# Patient Record
Sex: MALE | Race: OTHER | Employment: FULL TIME | URBAN - METROPOLITAN AREA
[De-identification: names, ages, dates, MRNs, and addresses within clinical notes are randomized per-mention and may not be internally consistent; named-entity substitution may affect disease eponyms.]

---

## 2017-06-08 ENCOUNTER — HOSPITAL ENCOUNTER (INPATIENT)
Facility: HOSPITAL | Age: 32
LOS: 4 days | Discharge: HOME | DRG: 386 | End: 2017-06-12
Attending: HOSPITALIST | Admitting: HOSPITALIST
Payer: MEDICAID

## 2017-06-08 ENCOUNTER — APPOINTMENT (OUTPATIENT)
Dept: CT IMAGING | Facility: HOSPITAL | Age: 32
DRG: 386 | End: 2017-06-08
Attending: EMERGENCY MEDICINE
Payer: MEDICAID

## 2017-06-08 DIAGNOSIS — R10.9 ABDOMINAL PAIN, ACUTE: Primary | ICD-10-CM

## 2017-06-08 DIAGNOSIS — K56.7 ILEUS OF UNSPECIFIED TYPE (HCC): ICD-10-CM

## 2017-06-08 PROCEDURE — 74177 CT ABD & PELVIS W/CONTRAST: CPT | Performed by: EMERGENCY MEDICINE

## 2017-06-08 PROCEDURE — 99223 1ST HOSP IP/OBS HIGH 75: CPT | Performed by: HOSPITALIST

## 2017-06-08 RX ORDER — MORPHINE SULFATE 2 MG/ML
2 INJECTION, SOLUTION INTRAMUSCULAR; INTRAVENOUS EVERY 2 HOUR PRN
Status: DISCONTINUED | OUTPATIENT
Start: 2017-06-08 | End: 2017-06-12

## 2017-06-08 RX ORDER — MORPHINE SULFATE 4 MG/ML
4 INJECTION, SOLUTION INTRAMUSCULAR; INTRAVENOUS ONCE
Status: COMPLETED | OUTPATIENT
Start: 2017-06-08 | End: 2017-06-08

## 2017-06-08 RX ORDER — DEXTROSE MONOHYDRATE 25 G/50ML
50 INJECTION, SOLUTION INTRAVENOUS AS NEEDED
Status: DISCONTINUED | OUTPATIENT
Start: 2017-06-08 | End: 2017-06-12

## 2017-06-08 RX ORDER — MORPHINE SULFATE 4 MG/ML
4 INJECTION, SOLUTION INTRAMUSCULAR; INTRAVENOUS EVERY 2 HOUR PRN
Status: DISCONTINUED | OUTPATIENT
Start: 2017-06-08 | End: 2017-06-12

## 2017-06-08 RX ORDER — SODIUM CHLORIDE AND POTASSIUM CHLORIDE .9; .15 G/100ML; G/100ML
SOLUTION INTRAVENOUS CONTINUOUS
Status: DISCONTINUED | OUTPATIENT
Start: 2017-06-08 | End: 2017-06-11

## 2017-06-08 RX ORDER — SODIUM CHLORIDE 9 MG/ML
1000 INJECTION, SOLUTION INTRAVENOUS ONCE
Status: COMPLETED | OUTPATIENT
Start: 2017-06-08 | End: 2017-06-08

## 2017-06-08 RX ORDER — METHYLPREDNISOLONE SODIUM SUCCINATE 40 MG/ML
40 INJECTION, POWDER, LYOPHILIZED, FOR SOLUTION INTRAMUSCULAR; INTRAVENOUS EVERY 8 HOURS
Status: DISCONTINUED | OUTPATIENT
Start: 2017-06-08 | End: 2017-06-11

## 2017-06-08 RX ORDER — METHYLPREDNISOLONE SODIUM SUCCINATE 40 MG/ML
40 INJECTION, POWDER, LYOPHILIZED, FOR SOLUTION INTRAMUSCULAR; INTRAVENOUS EVERY 6 HOURS
Status: DISCONTINUED | OUTPATIENT
Start: 2017-06-08 | End: 2017-06-08

## 2017-06-08 RX ORDER — ACETAMINOPHEN 325 MG/1
650 TABLET ORAL EVERY 6 HOURS PRN
Status: DISCONTINUED | OUTPATIENT
Start: 2017-06-08 | End: 2017-06-12

## 2017-06-08 RX ORDER — ONDANSETRON 2 MG/ML
4 INJECTION INTRAMUSCULAR; INTRAVENOUS EVERY 6 HOURS PRN
Status: DISCONTINUED | OUTPATIENT
Start: 2017-06-08 | End: 2017-06-12

## 2017-06-08 NOTE — ED PROVIDER NOTES
Patient Seen in: [facility] Emergency Department    History   Patient presents with:  Cath Tube Problem (gastrointestinal, urinary, integumentary)    Stated Complaint: pain by ostomy    HPI    Patient presents into the emergency room for evaluatio daily with breakfast. Prednisone 40 mg po daily for 1 week, 30 mg po daily x 1 wk, 20 mg po daily x 1 week, 10 mg po daily x 1 week, 10 mg po qod x 2 weeks and stop. No family history on file.       Smoking Status: Never Smoker quadrant of the abdomen surrounding the stoma. No rebound guarding or peritoneal signs. No board like rigidity. No flank tenderness   Musculoskeletal: Normal range of motion. Neurological: He is alert and oriented to person, place, and time.    Skin: S Osmolality 286 275-295 mOsm/kg   GFR, Non-African American >60 >=60   GFR, -American >60 >=60     7:15 PM  Care endorsed to Dr. Tiffany Infante for disposition      Disposition and Plan     Clinical Impression:  Abdominal pain, acute  (primary encounter d

## 2017-06-09 PROCEDURE — 99233 SBSQ HOSP IP/OBS HIGH 50: CPT | Performed by: HOSPITALIST

## 2017-06-09 RX ORDER — GLIMEPIRIDE 2 MG/1
2 TABLET ORAL 2 TIMES DAILY WITH MEALS
Status: DISCONTINUED | OUTPATIENT
Start: 2017-06-09 | End: 2017-06-12

## 2017-06-09 RX ORDER — GLIMEPIRIDE 2 MG/1
2 TABLET ORAL 2 TIMES DAILY WITH MEALS
COMMUNITY
End: 2021-01-07

## 2017-06-09 NOTE — PLAN OF CARE
Problem: Diabetes/Glucose Control  Goal: Glucose maintained within prescribed range  INTERVENTIONS:  - Monitor Blood Glucose as ordered  - Assess for signs and symptoms of hyperglycemia and hypoglycemia  - Administer ordered medications to maintain glucose vomiting  INTERVENTIONS:  - Maintain adequate hydration with IV or PO as ordered and tolerated  - Nasogastric tube to low intermittent suction as ordered  - Evaluate effectiveness of ordered antiemetic medications  - Provide nonpharmacologic comfort measur pain   Outcome: Progressing  (1) Patient pain level is assess using pain scale from 1 to 10.  (2) Patient is administered medications as needed for pain or discomfort. (3) Rest, relaxation, and music is utilized to distract patient from pain.

## 2017-06-09 NOTE — PROGRESS NOTES
[facility]    Progress Note    Vic Cleaning Patient Status:  Inpatient    1985 MRN [id number]   Location [facility] 5SW/SE Attending Delilah Powers MD   Hosp Day # 1 PCP Romulo Pinto MD       Subjective:   Viridiana Gilliam 50%, Glucose-Vitamin C    Results:     Lab Results  Component Value Date   WBC 5.9 06/09/2017   HGB 10.4* 06/09/2017   HCT 33.3* 06/09/2017    06/09/2017   CREATSERUM 0.70 06/09/2017   BUN 3* 06/09/2017    06/09/2017   K 4.9 06/09/2017   CL 10 initiated.  GI has been consulted    Abdominal wall cellulitis  Patient on Zosyn 3.375 g IV piggyback every 8 hours cultures pending at this time.     Type 2 diabetes  Well-controlled at this time however patient with tendency of high sugars with steroids w

## 2017-06-09 NOTE — H&P
[facility]    History & Physical    Js Jaramillo Patient Status:  Inpatient    1985 MRN [id number]   Location [facility] 5SW/SE Attending Francisco Billingsley MD   Hosp Day # 0 PCP Carina Portillo MD     Date:  2017  Da po daily x 1 week, 10 mg po daily x 1 week, 10 mg po qod x 2 weeks and stop. Facility-Administered Medications: None       Review of Systems:  Constitutional:  Weakness, Fatigue. Eye:  Negative. Ear/Nose/Mouth/Throat:  Negative.   Respiratory:  Negat 06/08/2017   BUN 4 06/08/2017    06/08/2017   K 3.3 06/08/2017    06/08/2017   CO2 24 06/08/2017    06/08/2017   CA 8.7 06/08/2017   ESRML 55 06/08/2017   CRP 9.5 06/08/2017       Imaging:  No exam resulted this encounter.     Assessment

## 2017-06-09 NOTE — PLAN OF CARE
Problem: Diabetes/Glucose Control  Goal: Glucose maintained within prescribed range  INTERVENTIONS:  - Monitor Blood Glucose as ordered  - Assess for signs and symptoms of hyperglycemia and hypoglycemia  - Administer ordered medications to maintain glucose profile  Outcome: Progressing  Goal: Maintains adequate nutritional intake (undernourished)  INTERVENTIONS:  - Monitor percentage of each meal consumed  - Identify factors contributing to decreased intake, treat as appropriate  - Assist with meals as neede

## 2017-06-10 PROCEDURE — 99233 SBSQ HOSP IP/OBS HIGH 50: CPT | Performed by: HOSPITALIST

## 2017-06-10 RX ORDER — CHOLECALCIFEROL (VITAMIN D3) 125 MCG
500 CAPSULE ORAL DAILY
Status: DISCONTINUED | OUTPATIENT
Start: 2017-06-11 | End: 2017-06-12

## 2017-06-10 RX ORDER — 0.9 % SODIUM CHLORIDE 0.9 %
VIAL (ML) INJECTION
Status: COMPLETED
Start: 2017-06-10 | End: 2017-06-10

## 2017-06-10 RX ORDER — CYANOCOBALAMIN 1000 UG/ML
1000 INJECTION INTRAMUSCULAR; SUBCUTANEOUS ONCE
Status: COMPLETED | OUTPATIENT
Start: 2017-06-10 | End: 2017-06-10

## 2017-06-10 NOTE — CONSULTS
[facility]    Report of Consultation    Cecy George Patient Status:  Inpatient    1985 MRN [id number]   Location [facility] 5SW/SE Attending Kirstin Guillory MD   Hosp Day # 1 PCP Yaneth Echevarria MD     Date of Admis 5 % 100 mL IVPB 3.375 g Intravenous Q8H       Prescriptions prior to admission:  Adalimumab (HUMIRA) 40 MG/0.8ML Subcutaneous Prefilled Syringe Kit Inject 40 mg into the skin once a week.  Take once weekly on tuesday   glimepiride 2 MG Oral Tab Take 2 mg by normal strength and tone. Normal symmetric reflexes.    Psychiatric: calm and cooperative    Results:     Laboratory Data:    Lab Results  Component Value Date   WBC 5.9 06/09/2017   HGB 10.4* 06/09/2017   HCT 33.3* 06/09/2017    06/09/2017   CREATSE MD, Bud Bonilla MD  6/9/2017    FISTULA PRESENT NEXT TO THE STOMA FROM WHICH THE STOOL COME OUT.

## 2017-06-10 NOTE — PLAN OF CARE
Problem: Diabetes/Glucose Control  Goal: Glucose maintained within prescribed range  INTERVENTIONS:  - Monitor Blood Glucose as ordered  - Assess for signs and symptoms of hyperglycemia and hypoglycemia  - Administer ordered medications to maintain glucose collaborating with pharmacy to review patient’s medication profile   Outcome: Progressing  Patient has illeostomy  Goal: Maintains adequate nutritional intake (undernourished)  INTERVENTIONS:  - Monitor percentage of each meal consumed  - Identify factors level they choose  - Honor patient and family perspectives and choices  Outcome: Progressing  Updated patient on POC and night time medications. Encouraged patient to ask any questions regarding care or concerns.   Patient is agreeable to POC and verbalize toileting schedule  Outcome: Progressing  Call light and bedside table within reach.   Patient verbalizes understanding of call light and uses appropriately

## 2017-06-10 NOTE — PROGRESS NOTES
[facility]    Progress Note    Mana Espino Patient Status:  Inpatient    1985 MRN [id number]   Location [facility] 5SW/SE Attending Navi Landaverde MD   Hosp Day # 2 PCP Mirza Alvares MD       Subjective:   Krissy Riley 1-11 Units Subcutaneous TID CC and HS   • MethylPREDNISolone Sodium Succ  40 mg Intravenous Q8H   • piperacillin-tazobactam  3.375 g Intravenous Q8H       Current PRN Inpatient Meds:      ondansetron HCl, morphINE sulfate (PF) **OR** morphINE sulfate (PF), adjacent rectus abdominis muscle which contains several small pockets of gas. Otherwise, no alina abscess identified in the abdominal wall or in the peritoneal cavity.  3. There is mild dilatation of the afferent small bowel suggestive of ileus or partial o

## 2017-06-10 NOTE — PLAN OF CARE
Problem: Diabetes/Glucose Control  Goal: Glucose maintained within prescribed range  INTERVENTIONS:  - Monitor Blood Glucose as ordered  - Assess for signs and symptoms of hyperglycemia and hypoglycemia  - Administer ordered medications to maintain glucose mobilization and activity  - Obtain nutritional consult as needed  - Establish a toileting routine/schedule  - Consider collaborating with pharmacy to review patient’s medication profile   Outcome: Progressing  Colostomy bag intact.   Goal: Maintains adequa care for you?  - Provide timely, complete, and accurate information to patient/family  - Incorporate patient and family knowledge, values, beliefs, and cultural backgrounds into the planning and delivery of care  - Encourage patient/family to participate i environment to reduce risk of injury  - Provide assistive devices as appropriate  - Consider OT/PT consult to assist with strengthening/mobility  - Encourage toileting schedule   Outcome: Progressing  Call light within reach,bed locked in lowest position.

## 2017-06-11 PROCEDURE — 99232 SBSQ HOSP IP/OBS MODERATE 35: CPT | Performed by: HOSPITALIST

## 2017-06-11 RX ORDER — 0.9 % SODIUM CHLORIDE 0.9 %
VIAL (ML) INJECTION
Status: COMPLETED
Start: 2017-06-11 | End: 2017-06-11

## 2017-06-11 RX ORDER — METHYLPREDNISOLONE SODIUM SUCCINATE 40 MG/ML
40 INJECTION, POWDER, LYOPHILIZED, FOR SOLUTION INTRAMUSCULAR; INTRAVENOUS EVERY 12 HOURS
Status: DISCONTINUED | OUTPATIENT
Start: 2017-06-12 | End: 2017-06-12

## 2017-06-11 NOTE — PLAN OF CARE
Problem: Diabetes/Glucose Control  Goal: Glucose maintained within prescribed range  INTERVENTIONS:  - Monitor Blood Glucose as ordered  - Assess for signs and symptoms of hyperglycemia and hypoglycemia  - Administer ordered medications to maintain glucose profile   Outcome: Progressing  Goal: Maintains adequate nutritional intake (undernourished)  INTERVENTIONS:  - Monitor percentage of each meal consumed  - Identify factors contributing to decreased intake, treat as appropriate  - Assist with meals as need at the level they choose  - Honor patient and family perspectives and choices   Outcome: Progressing    Problem: SKIN/TISSUE INTEGRITY - ADULT  Goal: Skin integrity remains intact  INTERVENTIONS  - Assess and document risk factors for pressure ulcer kaylee

## 2017-06-11 NOTE — PLAN OF CARE
Problem: Diabetes/Glucose Control  Goal: Glucose maintained within prescribed range  INTERVENTIONS:  - Monitor Blood Glucose as ordered  - Assess for signs and symptoms of hyperglycemia and hypoglycemia  - Administer ordered medications to maintain glucose collaborating with pharmacy to review patient’s medication profile   Outcome: Progressing  Receiving 0.9% NaCl infusion with 20mEq of Potassium at 75mL/hour  Goal: Maintains adequate nutritional intake (undernourished)  INTERVENTIONS:  - Monitor percentage cultural backgrounds into the planning and delivery of care  - Encourage patient/family to participate in care and decision-making at the level they choose  - Honor patient and family perspectives and choices   Outcome: Progressing  Updated patient on POC injury  - Provide assistive devices as appropriate  - Consider OT/PT consult to assist with strengthening/mobility  - Encourage toileting schedule   Outcome: Progressing  Call light and bedside table within reach.   Patient has been calling appropriately

## 2017-06-11 NOTE — PROGRESS NOTES
[facility]    Progress Note    Norman Houston Patient Status:  Inpatient    1985 MRN [id number]   Location [facility] 5SW/SE Attending Maria D Teran MD   Hosp Day # 3 PCP Sharona Gallegos MD       Subjective:   Rhoda Nunn 40 mg Intravenous Q8H   • piperacillin-tazobactam  3.375 g Intravenous Q8H       Current PRN Inpatient Meds:      ondansetron HCl, morphINE sulfate (PF) **OR** morphINE sulfate (PF), acetaminophen, dextrose 50%, Glucose-Vitamin C    Results:       Lab Resu diabetes  Well-controlled at this time however patient with tendency of high sugars with steroids will use high-dose sliding scale coverage.  Check A1c in a.m -->7.5., resumed Amaryl    Anemia  Possibly combo of B12 and iron def  Cannot tolerate PO iron  W

## 2017-06-12 ENCOUNTER — SURGERY (OUTPATIENT)
Age: 32
End: 2017-06-12

## 2017-06-12 VITALS
SYSTOLIC BLOOD PRESSURE: 127 MMHG | TEMPERATURE: 98 F | RESPIRATION RATE: 20 BRPM | WEIGHT: 165 LBS | HEIGHT: 70 IN | HEART RATE: 62 BPM | DIASTOLIC BLOOD PRESSURE: 73 MMHG | OXYGEN SATURATION: 97 % | BODY MASS INDEX: 23.62 KG/M2

## 2017-06-12 PROCEDURE — 99239 HOSP IP/OBS DSCHRG MGMT >30: CPT | Performed by: HOSPITALIST

## 2017-06-12 PROCEDURE — 0DJD8ZZ INSPECTION OF LOWER INTESTINAL TRACT, VIA NATURAL OR ARTIFICIAL OPENING ENDOSCOPIC: ICD-10-PCS | Performed by: INTERNAL MEDICINE

## 2017-06-12 RX ORDER — 0.9 % SODIUM CHLORIDE 0.9 %
VIAL (ML) INJECTION
Status: COMPLETED
Start: 2017-06-12 | End: 2017-06-12

## 2017-06-12 RX ORDER — PREDNISONE 20 MG/1
40 TABLET ORAL
Qty: 50 TABLET | Refills: 0 | Status: SHIPPED | OUTPATIENT
Start: 2017-06-12 | End: 2021-01-07

## 2017-06-12 RX ORDER — PREDNISONE 20 MG/1
40 TABLET ORAL
Status: DISCONTINUED | OUTPATIENT
Start: 2017-06-13 | End: 2017-06-12

## 2017-06-12 NOTE — OPERATIVE REPORT
Ileoscopy:  -s/p loop ileostomy:  Erythema and friability at the stoma - biopsies taken  Afferent loop proximally appeared normal except for slight patchy erythema. Biopsies taken from a distance 5-6 cms proximal to the stoma in the afferent loop.     Plan:

## 2017-06-12 NOTE — PLAN OF CARE
Problem: Diabetes/Glucose Control  Goal: Glucose maintained within prescribed range  INTERVENTIONS:  - Monitor Blood Glucose as ordered  - Assess for signs and symptoms of hyperglycemia and hypoglycemia  - Administer ordered medications to maintain glucose toileting routine/schedule  - Consider collaborating with pharmacy to review patient’s medication profile   Outcome: Progressing  Goal: Maintains adequate nutritional intake (undernourished)  INTERVENTIONS:  - Monitor percentage of each meal consumed  - Id Incision(s), wounds(s) or drain site(s) healing without S/S of infection  INTERVENTIONS:  - Assess and document risk factors for pressure ulcer development  - Assess and document skin integrity  - Assess and document dressing/incision, wound bed, drain sit

## 2017-06-12 NOTE — DISCHARGE SUMMARY
[facility]    Discharge Summary    Momo Barrientos Patient Status:  Inpatient    1985 MRN [id number]   Location [facility] 5SW/SE Attending Hernando Mcneil MD   Hosp Day # 4 PCP Cordell Mitchell MD     Date of Admissio taken  Afferent loop proximally appeared normal except for slight patchy erythema. Biopsies taken from a distance 5-6 cms proximal to the stoma in the afferent loop. Plan:  Continue biologics  Await humira blood levels and antibody levels.   Switch to or CONTINUE taking these medications       Instructions Prescription details    Acetaminophen-Codeine #3 300-30 MG Tabs   Commonly known as:  TYLENOL #3        Take 1 tablet by mouth every 4 (four) hours as needed for Pain.     Quantity:  30 tablet   Refills

## 2017-06-12 NOTE — PROGRESS NOTES
[facility]    Progress Note    Sourav Terrell Patient Status:  Inpatient    1985 MRN [id number]   Location [facility] 5SW/SE Attending Chad Washington MD   Hosp Day # 4 PCP Jose Campbell MD       Subjective:   Bridger Alexandra CC and HS   • piperacillin-tazobactam  3.375 g Intravenous Q8H       Current PRN Inpatient Meds:      ondansetron HCl, morphINE sulfate (PF) **OR** morphINE sulfate (PF), acetaminophen, dextrose 50%, Glucose-Vitamin C    Results:       Lab Results  Compone hours  Improved    Type 2 diabetes  Well-controlled at this time however patient with tendency of high sugars with steroids will use high-dose sliding scale coverage.  Check A1c in a.m -->7.5., Cont Amaryl    Anemia  Possibly combo of B12 and iron def  Can

## 2017-06-13 ENCOUNTER — TELEPHONE (OUTPATIENT)
Dept: MEDSURG UNIT | Facility: HOSPITAL | Age: 32
End: 2017-06-13

## 2017-06-13 NOTE — OPERATIVE REPORT
[facility]    PATIENT'S NAME: Cheikh TOPETE   ATTENDING PHYSICIAN: Nguyen Machado MD   OPERATING PHYSICIAN: Naomi Franco MD   PATIENT ACCOUNT#:   [id number]    LOCATION:  [address] [place] [address] #:   [id number]       DATE OF BIRTH: normal.    PLAN:    1. Follow biopsy results. 2.   Advance diet. 3.   Continue Humira. 4.   We will switch to oral steroids with taper. 5.   Outpatient followup. 6.   Further recommendations after workup and treatment. Dictated By Lashawn Ivey

## 2017-06-14 ENCOUNTER — TELEPHONE (OUTPATIENT)
Dept: CARDIOLOGY UNIT | Facility: HOSPITAL | Age: 32
End: 2017-06-14

## 2017-06-15 NOTE — PAYOR COMM NOTE
--------------  ADMISSION REVIEW     Payor: Jonathan Sebastian #:  [id number]  Authorization Number: [id number]    Admit date: 6/8/2017  6:10 PM         Patient Seen in: [facility] Emergency Department    History Systems   Constitutional: Positive for chills. Negative for fever. Gastrointestinal: Positive for abdominal pain. Negative for nausea, vomiting and diarrhea. All other systems reviewed and are negative.       Positive for stated complaint: pain by ostom Results for orders placed or performed during the hospital encounter of 27/33/72  -BASIC METABOLIC PANEL (8)   Collection Time: 06/08/17  6:25 PM   Result Value Ref Range   Glucose 161 (H) 70-99 mg/dL   Sodium 138 136-144 mmol/L   Potassium 3.3 3.3-5 file.    Allergies:    Ciprofloxacin               Comment:Other reaction(s): burning urination    Home Medications:  Prior to Admission Medications   Prescriptions Last Dose Informant Patient Reported? Taking?    Acetaminophen-Codeine #3 300-30 MG Oral Tab ileostomy bag. Lymphatics:  No lymphadenopathy neck, axilla, groin. Musculoskeletal: Normal range of motion. normal strength. Feet:  Normal pulses. Neurologic:  Alert, oriented, no focal deficits, cranial nerves II-XII are grossly intact.   Cognition a in the 24 hours ending 06/09/17 4770      GENERAL:  The patient appeared to be in no distress and was comfortable.   SKIN:  Warm and hydrated  PSYCHIATRIC: Calm and cooperative    HEENT:  Head was atraumatic and normocephalic.  Eyes: Sclera was anicteric.  the peritoneal cavity. 3. There is mild dilatation of the afferent small bowel suggestive of ileus or partial obstruction. Hyperemia of small bowel mucosa suggestive of inflammatory process.  Efferent small bowel is decompressed.             Assessment and 3.375 g IV piggyback every 8 hours cultures pending at this time.     Type 2 diabetes  Well-controlled at this time however patient with tendency of high sugars with steroids will use high-dose sliding scale coverage.  Check A1c in a.m., resume Amaryl    An use high-dose sliding scale coverage.  Check A1c in a.m -->7.5., resumed Amaryl    Anemia  Possibly combo of B12 and iron def  Cannot tolerate PO iron  Will cont venofer x 3 d total  Give IM B12 x 1 then po    Prophylaxis  Subcutaneous heparin            D

## 2017-10-03 ENCOUNTER — LAB ENCOUNTER (OUTPATIENT)
Dept: LAB | Age: 32
End: 2017-10-03
Attending: INTERNAL MEDICINE
Payer: MEDICAID

## 2017-10-03 DIAGNOSIS — D64.9 ANEMIA: ICD-10-CM

## 2017-10-03 DIAGNOSIS — K50.90 CROHN'S DISEASE (HCC): Primary | ICD-10-CM

## 2017-10-03 PROCEDURE — 82607 VITAMIN B-12: CPT

## 2017-10-03 PROCEDURE — 80053 COMPREHEN METABOLIC PANEL: CPT

## 2017-10-03 PROCEDURE — 82746 ASSAY OF FOLIC ACID SERUM: CPT

## 2017-10-03 PROCEDURE — 85025 COMPLETE CBC W/AUTO DIFF WBC: CPT

## 2017-10-03 PROCEDURE — 83540 ASSAY OF IRON: CPT

## 2017-10-03 PROCEDURE — 82657 ENZYME CELL ACTIVITY: CPT

## 2017-10-03 PROCEDURE — 84466 ASSAY OF TRANSFERRIN: CPT

## 2017-10-03 PROCEDURE — 36415 COLL VENOUS BLD VENIPUNCTURE: CPT

## 2018-01-11 ENCOUNTER — HOSPITAL ENCOUNTER (OUTPATIENT)
Dept: ULTRASOUND IMAGING | Age: 33
Discharge: HOME | End: 2018-01-11
Attending: NURSE PRACTITIONER
Payer: MEDICAID

## 2018-01-11 PROCEDURE — 93970 EXTREMITY STUDY: CPT | Performed by: NURSE PRACTITIONER

## 2020-12-05 ENCOUNTER — LAB ENCOUNTER (OUTPATIENT)
Dept: LAB | Facility: REFERENCE LAB | Age: 35
End: 2020-12-05
Attending: FAMILY MEDICINE
Payer: MEDICAID

## 2020-12-05 DIAGNOSIS — Z13.9 SCREENING FOR CONDITION: Primary | ICD-10-CM

## 2020-12-05 DIAGNOSIS — E11.9 DIABETES MELLITUS (HCC): ICD-10-CM

## 2020-12-05 DIAGNOSIS — R03.0 ELEVATED BLOOD PRESSURE READING WITHOUT DIAGNOSIS OF HYPERTENSION: ICD-10-CM

## 2020-12-05 PROCEDURE — 36415 COLL VENOUS BLD VENIPUNCTURE: CPT

## 2020-12-05 PROCEDURE — 85025 COMPLETE CBC W/AUTO DIFF WBC: CPT

## 2020-12-05 PROCEDURE — 80061 LIPID PANEL: CPT

## 2020-12-05 PROCEDURE — 84443 ASSAY THYROID STIM HORMONE: CPT

## 2020-12-05 PROCEDURE — 81001 URINALYSIS AUTO W/SCOPE: CPT

## 2020-12-05 PROCEDURE — 85060 BLOOD SMEAR INTERPRETATION: CPT

## 2020-12-05 PROCEDURE — 80053 COMPREHEN METABOLIC PANEL: CPT

## 2020-12-05 PROCEDURE — 83036 HEMOGLOBIN GLYCOSYLATED A1C: CPT

## 2020-12-12 ENCOUNTER — OFFICE VISIT (OUTPATIENT)
Dept: OPHTHALMOLOGY | Facility: CLINIC | Age: 35
End: 2020-12-12
Payer: MEDICAID

## 2020-12-12 DIAGNOSIS — E11.9 TYPE 2 DIABETES MELLITUS WITHOUT RETINOPATHY (HCC): Primary | ICD-10-CM

## 2020-12-12 PROCEDURE — 92004 COMPRE OPH EXAM NEW PT 1/>: CPT | Performed by: OPHTHALMOLOGY

## 2020-12-12 NOTE — ASSESSMENT & PLAN NOTE
Diabetes type II: no background of retinopathy, no signs of neovascularization noted. Discussed ocular and systemic benefits of blood sugar control. Diagnosis and treatment discussed in detail with patient.   Patient has an upcoming appointment with endoc

## 2020-12-12 NOTE — PROGRESS NOTES
Christopher Arredondo is a 28year old male.     HPI:     HPI     Diabetic Eye Exam      Additional comments: Pt has been a diabetic for 2 years       Pt's diabetes is currently controlled by pills  Pt checks BS 2x a day   Pt's last blood sugar was 154  Last HA1C w Oral Tab Take 1 tablet by mouth every 4 (four) hours as needed for Pain.  30 tablet 0       Allergies:    Ciprofloxacin               Comment:Other reaction(s): burning urination    ROS:     ROS     Positive for: Endocrine, Eyes    Negative for: Constitutio an upcoming appointment with endocrinologist- Dr. Ian Jaeger. Stressed importance of blood sugar control. Stressed importance of yearly eye exams due to diabetes. No orders of the defined types were placed in this encounter.       Meds This Visit:  JEANIE

## 2020-12-12 NOTE — PATIENT INSTRUCTIONS
Type 2 diabetes mellitus without retinopathy ([facility] [address]  Diabetes type II: no background of retinopathy, no signs of neovascularization noted. Discussed ocular and systemic benefits of blood sugar control.   Diagnosis and treatment discussed in detail with brenton

## 2021-01-07 ENCOUNTER — OFFICE VISIT (OUTPATIENT)
Dept: NEPHROLOGY | Facility: CLINIC | Age: 36
End: 2021-01-07
Payer: MEDICAID

## 2021-01-07 VITALS
DIASTOLIC BLOOD PRESSURE: 67 MMHG | BODY MASS INDEX: 24.01 KG/M2 | HEART RATE: 102 BPM | SYSTOLIC BLOOD PRESSURE: 115 MMHG | WEIGHT: 153 LBS | HEIGHT: 67 IN

## 2021-01-07 DIAGNOSIS — R80.9 NON-NEPHROTIC RANGE PROTEINURIA: Primary | ICD-10-CM

## 2021-01-07 PROCEDURE — 3008F BODY MASS INDEX DOCD: CPT | Performed by: INTERNAL MEDICINE

## 2021-01-07 PROCEDURE — 3074F SYST BP LT 130 MM HG: CPT | Performed by: INTERNAL MEDICINE

## 2021-01-07 PROCEDURE — 3078F DIAST BP <80 MM HG: CPT | Performed by: INTERNAL MEDICINE

## 2021-01-07 PROCEDURE — 99244 OFF/OP CNSLTJ NEW/EST MOD 40: CPT | Performed by: INTERNAL MEDICINE

## 2021-01-07 RX ORDER — SITAGLIPTIN 50 MG/1
50 TABLET, FILM COATED ORAL DAILY
COMMUNITY
Start: 2020-12-22

## 2021-01-07 RX ORDER — GLIPIZIDE 10 MG/1
10 TABLET ORAL 2 TIMES DAILY
COMMUNITY
Start: 2020-12-08

## 2021-01-07 NOTE — PROGRESS NOTES
Consult Requested By: Dr. Jeremy Rosales    Reason for Consult: Proteinuria     HPI:     Patient is a 28 yrs old male with pmh of DM II x 3 yrs, Crohn's disease s/p ileostomy 2012 at [facility], nephrolithiasis x 1 who presented for work up and evaluation of kidn for decreased activity, fever, irritability and lethargy  ENMT:  Negative for ear drainage, hearing loss and nasal drainage  Eyes:  Negative for eye discharge and vision loss  Cardiovascular:  Negative for chest pain, sobs  Respiratory:  Negative for cough Ratio, Random Urine      Protein,Total,Urine, Random [E]      Meds This Visit:  Requested Prescriptions      No prescriptions requested or ordered in this encounter       Imaging & Referrals:  US KIDNEY/BLADDER (YXE=[id number])     1/7/2021  Adrián Staton MD

## 2021-01-08 ENCOUNTER — APPOINTMENT (OUTPATIENT)
Dept: HEMATOLOGY/ONCOLOGY | Facility: HOSPITAL | Age: 36
End: 2021-01-08
Attending: INTERNAL MEDICINE
Payer: MEDICAID

## 2021-01-18 ENCOUNTER — HOSPITAL ENCOUNTER (OUTPATIENT)
Dept: ULTRASOUND IMAGING | Age: 36
Discharge: HOME | End: 2021-01-18
Attending: INTERNAL MEDICINE
Payer: MEDICAID

## 2021-01-18 DIAGNOSIS — R80.9 NON-NEPHROTIC RANGE PROTEINURIA: ICD-10-CM

## 2021-01-18 PROCEDURE — 76770 US EXAM ABDO BACK WALL COMP: CPT | Performed by: INTERNAL MEDICINE

## 2021-01-25 ENCOUNTER — TELEMEDICINE (OUTPATIENT)
Dept: ENDOCRINOLOGY CLINIC | Facility: CLINIC | Age: 36
End: 2021-01-25
Payer: MEDICAID

## 2021-01-25 DIAGNOSIS — E11.65 TYPE 2 DIABETES MELLITUS WITH HYPERGLYCEMIA, WITHOUT LONG-TERM CURRENT USE OF INSULIN (HCC): Primary | ICD-10-CM

## 2021-01-25 PROCEDURE — 99204 OFFICE O/P NEW MOD 45 MIN: CPT | Performed by: INTERNAL MEDICINE

## 2021-01-25 NOTE — H&P
Telehealth outside of [address] Verbal Consent   I conducted a telehealth visit with Anuja Dey today, 01/25/21, which was completed using two-way, real-time interactive audio and video communication.  This has been done in good nathalie to provid denies - last eye exam : up to date  History of Neuropathy: denies  History of Nephropathy: will check    ASSOCIATED COMPLICATIONS:   HTN: denies  Hyperlipidemia: no  Coronary Artery Disease:  denies  Cerebrovascular Disease: denies      HOME GLUCOSE READI Negative for: weight change, fever, fatigue, cold/heat intolerance  Eyes: Negative for:  Visual changes, proptosis, blurring, floaters, poor night vision, impaired color vision  ENT: Negative for:  dysphagia, neck swelling, dysphonia  Respiratory: Negative controlling DM to prevent associated complications  - Importance of dietary changes  Provided support and encouragement   Discussed meal prep ideas   - Daily exercise  - I also reviewed various DM medications  Will avoid SGLT 2 inhibitors given h/o renal s and video communication. This has been done in good nathalie to provide continuity of care in the best interest of the provider-patient relationship, due to the ongoing public health crisis/national emergency and because of restrictions of visitation.   There

## 2021-01-29 ENCOUNTER — OFFICE VISIT (OUTPATIENT)
Dept: HEMATOLOGY/ONCOLOGY | Facility: HOSPITAL | Age: 36
End: 2021-01-29
Attending: INTERNAL MEDICINE
Payer: MEDICAID

## 2021-01-29 VITALS
TEMPERATURE: 98 F | BODY MASS INDEX: 23.7 KG/M2 | RESPIRATION RATE: 16 BRPM | DIASTOLIC BLOOD PRESSURE: 74 MMHG | WEIGHT: 151 LBS | SYSTOLIC BLOOD PRESSURE: 112 MMHG | HEART RATE: 86 BPM | OXYGEN SATURATION: 100 % | HEIGHT: 67 IN

## 2021-01-29 DIAGNOSIS — E55.9 VITAMIN D DEFICIENCY: ICD-10-CM

## 2021-01-29 DIAGNOSIS — K90.89 OTHER SPECIFIED INTESTINAL MALABSORPTION: ICD-10-CM

## 2021-01-29 DIAGNOSIS — D50.0 IRON DEFICIENCY ANEMIA DUE TO CHRONIC BLOOD LOSS: Primary | ICD-10-CM

## 2021-01-29 DIAGNOSIS — E53.8 B12 DEFICIENCY: ICD-10-CM

## 2021-01-29 PROBLEM — K90.9 MALABSORPTION: Status: ACTIVE | Noted: 2021-01-29

## 2021-01-29 PROBLEM — K90.9 MALABSORPTION (HCC): Status: ACTIVE | Noted: 2021-01-29

## 2021-01-29 PROCEDURE — 99244 OFF/OP CNSLTJ NEW/EST MOD 40: CPT | Performed by: INTERNAL MEDICINE

## 2021-01-29 RX ORDER — ERGOCALCIFEROL 1.25 MG/1
50000 CAPSULE ORAL WEEKLY
Qty: 4 CAPSULE | Refills: 2 | Status: SHIPPED | OUTPATIENT
Start: 2021-01-29 | End: 2021-08-03

## 2021-01-29 RX ORDER — CYANOCOBALAMIN 1000 UG/ML
1000 INJECTION INTRAMUSCULAR; SUBCUTANEOUS ONCE
Status: CANCELLED | OUTPATIENT
Start: 2021-01-29

## 2021-01-29 RX ORDER — FOLIC ACID 1 MG/1
1 TABLET ORAL DAILY
Qty: 90 TABLET | Refills: 3 | Status: SHIPPED | OUTPATIENT
Start: 2021-01-29

## 2021-01-29 NOTE — CONSULTS
FLOR Espino is a 28year old male who is here today at the request of Rosemarie Villa MD for evaluation of Iron deficiency anemia due to chronic blood loss  (primary encounter diagnosis)  Other specified intestinal malabsorption  B12 deficiency Sig Dispense Refill   • ustekinumab 90 MG/ML Subcutaneous Solution Prefilled Syringe injection INJECT 90MG UNDER THE SKIN EVERY 8 WEEKS     • metFORMIN HCl 1000 MG Oral Tab Take 1,000 mg by mouth 2 (two) times daily.      • glipiZIDE 10 MG Oral Tab Take 10 diagnosis)  Other specified intestinal malabsorption  B12 deficiency  Vitamin d deficiency    Anemia has been present for over 10 years.     Patient has anemia due to chronic iron deficiency secondary to poor absorption as a sequela of Chron's disease and i patient has nutritional deficiencies, will replace accordingly.       Follow up in 3 months with Orders Placed This Encounter      CBC With Differential With Platelet          Standing Status: Standing          Number of Occurrences: 4          Standing Exp MONOCYTES #      0.0 - 1.0 K/UL       EOSINOPHILS #      0.0 - 0.7 K/UL       BASOPHILS #      0.0 - 0.2 K/UL       ANISOCYTOSIS             HYPOCHROMASIA             MACROCYTOSIS             MICROCYTOSIS             RDW-SD      35.1 - 46.3 fL 36.7 ANISOCYTOSIS        2+ (A)   HYPOCHROMASIA        1+   MACROCYTOSIS        1+   MICROCYTOSIS        2+ (A)   RDW-SD      35.1 - 46.3 fL     RDW      11.0 - 15.0 %     Prelim Neutrophil Abs      1.50 - 7.70 x10 (3) uL     Neutrophils Absolute      1.50 - (01/26/2021 11:43 AM CST)   Performing Organization Address City/State/ZIP Code Phone Number   [phone] [facility] [address] [facility]       Back to top of Lab Results       CBC (01/26/2021 11:43 Results       C-reactive protein (01/26/2021 11:43 AM CST)  C-reactive protein (01/26/2021 11:43 AM CST)   Component Value Ref Range Performed At Pathologist Signature   C-REACTIVE PROTEIN 23.4 (H) <8.0 MG/L [facility] PATHOLOGY LABORATORY       C-reactive GFR (mL/min/1.73 m2) = 141 x min(Scr/kappa, 1)alpha x max(Scr/kappa, 1)-1.209 x 0.993Age x 1.018 (if female) x 1.159 (if black) where:  -Scr is serum creatinine in mg/dL  -kappa is 0.7 for females and 0.9 for males  -alpha is -0.329 for females and -0. [place] [address] Venous blood specimen (specimen)     CBC and differential (01/26/2021 11:43 AM CST)   Narrative Performed At   The following orders were created for panel order CBC and differential.    Procedure                               Abnormality         Status     At Pathologist Signature   HEPATITIS B CORE AB, TOTAL Negative  Comment:   The test is performed using Abbott  Chemiluminescent   Microparticle Immunoassay.   Negative [facility] PATHOLOGY LABORATORY       Hepatitis B core antibody, total (01/26/202 (01/26/2021 11:43 AM CST)  Hepatitis B surface antigen (01/26/2021 11:43 AM CST)   Component Value Ref Range Performed At Pathologist Signature   HEPATITIS B SURFACE AG Negative  Comment:   The test is performed using Abbott  Chemiluminescent   Mi (01/26/2021 11:43 AM CST)   Performing Organization Address City/State/ZIP Code Phone Number   [phone] [address] Tirado [facility] [address] [facility] [address]       Back to top of Lab Results       Vitamin D 25 hydroxy ( Statement B: [email or web address]  Performed By: Maggie Garcia [address]  [place], [address] [facility]  : Wilberto Nevarez.  Yue Fontenot MD 60.0 - 120.0 ug/dL [facility] LABORATORY       Zinc (01/26/2021 11:43 AM CST)   Specimen   Blood - Venous blood spec

## 2021-01-29 NOTE — PATIENT INSTRUCTIONS
Iron Dextran injection  Brand Name: Yari Ordonez  What is this medicine? IRON DEXTRAN (AHY madelaine DEX matt) is an iron complex. Iron is used to make healthy red blood cells, which carry oxygen and nutrients through the body.  This medicine is used to treat people It is important not to miss your dose. Call your doctor or health care professional if you are unable to keep an appointment. Where should I keep my medicine? This drug is given in a hospital or clinic and will not be stored at home.   What should I tell

## 2021-02-04 ENCOUNTER — OFFICE VISIT (OUTPATIENT)
Dept: HEMATOLOGY/ONCOLOGY | Facility: HOSPITAL | Age: 36
End: 2021-02-04
Attending: INTERNAL MEDICINE
Payer: MEDICAID

## 2021-02-04 VITALS
SYSTOLIC BLOOD PRESSURE: 117 MMHG | RESPIRATION RATE: 16 BRPM | OXYGEN SATURATION: 100 % | DIASTOLIC BLOOD PRESSURE: 56 MMHG | TEMPERATURE: 98 F | HEART RATE: 98 BPM

## 2021-02-04 VITALS
HEART RATE: 98 BPM | TEMPERATURE: 98 F | DIASTOLIC BLOOD PRESSURE: 56 MMHG | RESPIRATION RATE: 16 BRPM | SYSTOLIC BLOOD PRESSURE: 117 MMHG | OXYGEN SATURATION: 100 %

## 2021-02-04 DIAGNOSIS — D50.0 IRON DEFICIENCY ANEMIA DUE TO CHRONIC BLOOD LOSS: Primary | ICD-10-CM

## 2021-02-04 DIAGNOSIS — E53.8 B12 DEFICIENCY: ICD-10-CM

## 2021-02-04 DIAGNOSIS — T80.90XA INFUSION REACTION, INITIAL ENCOUNTER: ICD-10-CM

## 2021-02-04 DIAGNOSIS — K90.89 OTHER SPECIFIED INTESTINAL MALABSORPTION: ICD-10-CM

## 2021-02-04 DIAGNOSIS — D50.8 IRON DEFICIENCY ANEMIA SECONDARY TO INADEQUATE DIETARY IRON INTAKE: ICD-10-CM

## 2021-02-04 DIAGNOSIS — D50.8 IRON DEFICIENCY ANEMIA SECONDARY TO INADEQUATE DIETARY IRON INTAKE: Primary | ICD-10-CM

## 2021-02-04 PROCEDURE — S0028 INJECTION, FAMOTIDINE, 20 MG: HCPCS

## 2021-02-04 PROCEDURE — 96374 THER/PROPH/DIAG INJ IV PUSH: CPT

## 2021-02-04 PROCEDURE — 96372 THER/PROPH/DIAG INJ SC/IM: CPT

## 2021-02-04 PROCEDURE — 99213 OFFICE O/P EST LOW 20 MIN: CPT | Performed by: NURSE PRACTITIONER

## 2021-02-04 PROCEDURE — 96375 TX/PRO/DX INJ NEW DRUG ADDON: CPT

## 2021-02-04 RX ORDER — FAMOTIDINE 10 MG/ML
INJECTION, SOLUTION INTRAVENOUS
Status: COMPLETED
Start: 2021-02-04 | End: 2021-02-04

## 2021-02-04 RX ORDER — CYANOCOBALAMIN 1000 UG/ML
INJECTION INTRAMUSCULAR; SUBCUTANEOUS
Status: COMPLETED
Start: 2021-02-04 | End: 2021-02-04

## 2021-02-04 RX ORDER — CYANOCOBALAMIN 1000 UG/ML
1000 INJECTION INTRAMUSCULAR; SUBCUTANEOUS ONCE
Status: COMPLETED | OUTPATIENT
Start: 2021-02-04 | End: 2021-02-04

## 2021-02-04 RX ORDER — CYANOCOBALAMIN 1000 UG/ML
1000 INJECTION INTRAMUSCULAR; SUBCUTANEOUS ONCE
Status: CANCELLED | OUTPATIENT
Start: 2021-02-05

## 2021-02-04 RX ORDER — METHYLPREDNISOLONE SODIUM SUCCINATE 40 MG/ML
40 INJECTION, POWDER, LYOPHILIZED, FOR SOLUTION INTRAMUSCULAR; INTRAVENOUS ONCE
Status: COMPLETED | OUTPATIENT
Start: 2021-02-04 | End: 2021-02-04

## 2021-02-04 RX ORDER — FAMOTIDINE 10 MG/ML
20 INJECTION, SOLUTION INTRAVENOUS ONCE
Status: COMPLETED | OUTPATIENT
Start: 2021-02-04 | End: 2021-02-04

## 2021-02-04 RX ADMIN — FAMOTIDINE 20 MG: 10 INJECTION, SOLUTION INTRAVENOUS at 13:40:00

## 2021-02-04 RX ADMIN — CYANOCOBALAMIN 1000 MCG: 1000 INJECTION INTRAMUSCULAR; SUBCUTANEOUS at 14:12:00

## 2021-02-04 RX ADMIN — METHYLPREDNISOLONE SODIUM SUCCINATE 40 MG: 40 INJECTION, POWDER, LYOPHILIZED, FOR SOLUTION INTRAMUSCULAR; INTRAVENOUS at 13:50:00

## 2021-02-04 NOTE — PROGRESS NOTES
Asked to see patient in infusion center. Pt had hives across both shoulder and tightness in the chest after receiving iron dextran test dose. Pt was given famotidine and IV steroids and symptoms resolved. Discussed with Dr Jc Dial- will treat as allergy.  Pt wi

## 2021-02-04 NOTE — PROGRESS NOTES
Patient arrives for iron dextran and B12 injection. Patient reports he has had iron in the past unaware of what kind. Educated patient on what to expect when receiving iron. PIV started in left forearm, positive blood return noted.    Test dose of iron dext

## 2021-02-05 ENCOUNTER — NURSE ONLY (OUTPATIENT)
Dept: HEMATOLOGY/ONCOLOGY | Facility: HOSPITAL | Age: 36
End: 2021-02-05
Attending: INTERNAL MEDICINE
Payer: MEDICAID

## 2021-02-05 VITALS
HEART RATE: 77 BPM | DIASTOLIC BLOOD PRESSURE: 69 MMHG | OXYGEN SATURATION: 100 % | TEMPERATURE: 98 F | SYSTOLIC BLOOD PRESSURE: 117 MMHG | RESPIRATION RATE: 16 BRPM

## 2021-02-05 DIAGNOSIS — K90.89 OTHER SPECIFIED INTESTINAL MALABSORPTION: ICD-10-CM

## 2021-02-05 DIAGNOSIS — D50.0 IRON DEFICIENCY ANEMIA DUE TO CHRONIC BLOOD LOSS: Primary | ICD-10-CM

## 2021-02-05 DIAGNOSIS — D50.8 IRON DEFICIENCY ANEMIA SECONDARY TO INADEQUATE DIETARY IRON INTAKE: ICD-10-CM

## 2021-02-05 PROCEDURE — 96372 THER/PROPH/DIAG INJ SC/IM: CPT

## 2021-02-05 RX ORDER — CYANOCOBALAMIN 1000 UG/ML
1000 INJECTION INTRAMUSCULAR; SUBCUTANEOUS ONCE
Status: CANCELLED | OUTPATIENT
Start: 2021-02-05

## 2021-02-05 RX ORDER — CYANOCOBALAMIN 1000 UG/ML
1000 INJECTION INTRAMUSCULAR; SUBCUTANEOUS ONCE
Status: CANCELLED
Start: 2021-02-05 | End: 2021-02-05

## 2021-02-05 RX ORDER — CYANOCOBALAMIN 1000 UG/ML
1000 INJECTION INTRAMUSCULAR; SUBCUTANEOUS ONCE
Status: CANCELLED
Start: 2021-02-06 | End: 2021-02-06

## 2021-02-05 RX ORDER — CYANOCOBALAMIN 1000 UG/ML
1000 INJECTION INTRAMUSCULAR; SUBCUTANEOUS ONCE
Status: CANCELLED | OUTPATIENT
Start: 2021-02-06

## 2021-02-05 RX ORDER — CYANOCOBALAMIN 1000 UG/ML
1000 INJECTION INTRAMUSCULAR; SUBCUTANEOUS ONCE
Status: COMPLETED | OUTPATIENT
Start: 2021-02-05 | End: 2021-02-05

## 2021-02-05 RX ORDER — CYANOCOBALAMIN 1000 UG/ML
INJECTION INTRAMUSCULAR; SUBCUTANEOUS
Status: DISCONTINUED
Start: 2021-02-05 | End: 2021-02-05

## 2021-02-05 RX ADMIN — CYANOCOBALAMIN 1000 MCG: 1000 INJECTION INTRAMUSCULAR; SUBCUTANEOUS at 13:20:00

## 2021-02-05 NOTE — PROGRESS NOTES
James Speaks is here for day 2 of 5 B12 injections  He is feeling very well today  No s/s adverse response after yesterday - no further hives, no rash or other issues or concerns  He is in good spirits  VSS    Reviewed scheduling  He wishes to know when venofer

## 2021-02-08 ENCOUNTER — NURSE ONLY (OUTPATIENT)
Dept: HEMATOLOGY/ONCOLOGY | Facility: HOSPITAL | Age: 36
End: 2021-02-08
Attending: INTERNAL MEDICINE
Payer: MEDICAID

## 2021-02-08 VITALS
RESPIRATION RATE: 16 BRPM | TEMPERATURE: 98 F | DIASTOLIC BLOOD PRESSURE: 64 MMHG | OXYGEN SATURATION: 100 % | SYSTOLIC BLOOD PRESSURE: 118 MMHG | HEART RATE: 92 BPM

## 2021-02-08 DIAGNOSIS — D50.8 IRON DEFICIENCY ANEMIA SECONDARY TO INADEQUATE DIETARY IRON INTAKE: ICD-10-CM

## 2021-02-08 DIAGNOSIS — K90.89 OTHER SPECIFIED INTESTINAL MALABSORPTION: ICD-10-CM

## 2021-02-08 DIAGNOSIS — D50.0 IRON DEFICIENCY ANEMIA DUE TO CHRONIC BLOOD LOSS: Primary | ICD-10-CM

## 2021-02-08 PROCEDURE — 96372 THER/PROPH/DIAG INJ SC/IM: CPT

## 2021-02-08 PROCEDURE — 96374 THER/PROPH/DIAG INJ IV PUSH: CPT

## 2021-02-08 RX ORDER — CYANOCOBALAMIN 1000 UG/ML
1000 INJECTION INTRAMUSCULAR; SUBCUTANEOUS ONCE
Status: COMPLETED | OUTPATIENT
Start: 2021-02-08 | End: 2021-02-08

## 2021-02-08 RX ORDER — CYANOCOBALAMIN 1000 UG/ML
1000 INJECTION INTRAMUSCULAR; SUBCUTANEOUS ONCE
Status: CANCELLED | OUTPATIENT
Start: 2021-02-09

## 2021-02-08 RX ORDER — CYANOCOBALAMIN 1000 UG/ML
INJECTION INTRAMUSCULAR; SUBCUTANEOUS
Status: COMPLETED
Start: 2021-02-08 | End: 2021-02-08

## 2021-02-08 RX ORDER — CYANOCOBALAMIN 1000 UG/ML
1000 INJECTION INTRAMUSCULAR; SUBCUTANEOUS ONCE
Status: CANCELLED
Start: 2021-02-09 | End: 2021-02-09

## 2021-02-08 RX ADMIN — CYANOCOBALAMIN 1000 MCG: 1000 INJECTION INTRAMUSCULAR; SUBCUTANEOUS at 09:59:00

## 2021-02-08 NOTE — PROGRESS NOTES
Ariel to infusion today for 3 of 5 Vitamin B12 and 1 of 5 200mg Venofer. He arrives alert and independent. Patient has been tolerating B12 injections well. Patient had a reaction to Iron Dextran test dose last week- hives and chest discomfort.  He has rece

## 2021-02-09 ENCOUNTER — NURSE ONLY (OUTPATIENT)
Dept: HEMATOLOGY/ONCOLOGY | Facility: HOSPITAL | Age: 36
End: 2021-02-09
Attending: INTERNAL MEDICINE
Payer: MEDICAID

## 2021-02-09 VITALS
RESPIRATION RATE: 16 BRPM | DIASTOLIC BLOOD PRESSURE: 69 MMHG | TEMPERATURE: 98 F | HEART RATE: 97 BPM | SYSTOLIC BLOOD PRESSURE: 129 MMHG

## 2021-02-09 DIAGNOSIS — D50.8 IRON DEFICIENCY ANEMIA SECONDARY TO INADEQUATE DIETARY IRON INTAKE: ICD-10-CM

## 2021-02-09 DIAGNOSIS — D50.0 IRON DEFICIENCY ANEMIA DUE TO CHRONIC BLOOD LOSS: Primary | ICD-10-CM

## 2021-02-09 DIAGNOSIS — K90.89 OTHER SPECIFIED INTESTINAL MALABSORPTION: ICD-10-CM

## 2021-02-09 PROCEDURE — 96372 THER/PROPH/DIAG INJ SC/IM: CPT

## 2021-02-09 RX ORDER — CYANOCOBALAMIN 1000 UG/ML
1000 INJECTION INTRAMUSCULAR; SUBCUTANEOUS ONCE
Status: CANCELLED
Start: 2021-02-10 | End: 2021-02-10

## 2021-02-09 RX ORDER — CYANOCOBALAMIN 1000 UG/ML
1000 INJECTION INTRAMUSCULAR; SUBCUTANEOUS ONCE
Status: COMPLETED | OUTPATIENT
Start: 2021-02-09 | End: 2021-02-09

## 2021-02-09 RX ORDER — CYANOCOBALAMIN 1000 UG/ML
1000 INJECTION INTRAMUSCULAR; SUBCUTANEOUS ONCE
Status: CANCELLED | OUTPATIENT
Start: 2021-02-10

## 2021-02-09 RX ORDER — CYANOCOBALAMIN 1000 UG/ML
INJECTION INTRAMUSCULAR; SUBCUTANEOUS
Status: COMPLETED
Start: 2021-02-09 | End: 2021-02-09

## 2021-02-09 RX ADMIN — CYANOCOBALAMIN 1000 MCG: 1000 INJECTION INTRAMUSCULAR; SUBCUTANEOUS at 14:14:00

## 2021-02-09 NOTE — PROGRESS NOTES
Patient here to get 4 of 5 daily B 12 IM injection. Patient with no complaints, thought he was going to get Venofer today. Patient is scheduled to get Venofer and B 12 tomorrow. Patient requested appointment get changed to morning.   I notified kunal

## 2021-02-10 ENCOUNTER — OFFICE VISIT (OUTPATIENT)
Dept: HEMATOLOGY/ONCOLOGY | Facility: HOSPITAL | Age: 36
End: 2021-02-10
Attending: INTERNAL MEDICINE
Payer: MEDICAID

## 2021-02-10 VITALS
SYSTOLIC BLOOD PRESSURE: 123 MMHG | RESPIRATION RATE: 16 BRPM | DIASTOLIC BLOOD PRESSURE: 68 MMHG | TEMPERATURE: 98 F | HEART RATE: 91 BPM | OXYGEN SATURATION: 100 %

## 2021-02-10 DIAGNOSIS — D50.8 IRON DEFICIENCY ANEMIA SECONDARY TO INADEQUATE DIETARY IRON INTAKE: ICD-10-CM

## 2021-02-10 DIAGNOSIS — K90.89 OTHER SPECIFIED INTESTINAL MALABSORPTION: ICD-10-CM

## 2021-02-10 DIAGNOSIS — D50.0 IRON DEFICIENCY ANEMIA DUE TO CHRONIC BLOOD LOSS: Primary | ICD-10-CM

## 2021-02-10 PROCEDURE — 96372 THER/PROPH/DIAG INJ SC/IM: CPT

## 2021-02-10 PROCEDURE — 96374 THER/PROPH/DIAG INJ IV PUSH: CPT

## 2021-02-10 RX ORDER — CYANOCOBALAMIN 1000 UG/ML
1000 INJECTION INTRAMUSCULAR; SUBCUTANEOUS ONCE
Status: CANCELLED | OUTPATIENT
Start: 2021-02-12

## 2021-02-10 RX ORDER — CYANOCOBALAMIN 1000 UG/ML
1000 INJECTION INTRAMUSCULAR; SUBCUTANEOUS ONCE
Status: CANCELLED
Start: 2021-02-12 | End: 2021-02-12

## 2021-02-10 RX ORDER — CYANOCOBALAMIN 1000 UG/ML
INJECTION INTRAMUSCULAR; SUBCUTANEOUS
Status: COMPLETED
Start: 2021-02-10 | End: 2021-02-10

## 2021-02-10 RX ORDER — CYANOCOBALAMIN 1000 UG/ML
1000 INJECTION INTRAMUSCULAR; SUBCUTANEOUS ONCE
Status: COMPLETED | OUTPATIENT
Start: 2021-02-10 | End: 2021-02-10

## 2021-02-10 RX ADMIN — CYANOCOBALAMIN 1000 MCG: 1000 INJECTION INTRAMUSCULAR; SUBCUTANEOUS at 08:12:00

## 2021-02-10 NOTE — PROGRESS NOTES
Pt to infusion for daily b12 5/5 and Venofer 2/5. Pt arrived ambulating independently. Feeling well overall. Tolerated first Venofer well without incident. B12 given in L deltoid and tolerated well. Venofer given IVP over 6 mins and tolerated well.  Pt obse

## 2021-02-12 ENCOUNTER — OFFICE VISIT (OUTPATIENT)
Dept: HEMATOLOGY/ONCOLOGY | Facility: HOSPITAL | Age: 36
End: 2021-02-12
Attending: INTERNAL MEDICINE
Payer: MEDICAID

## 2021-02-12 VITALS
SYSTOLIC BLOOD PRESSURE: 126 MMHG | TEMPERATURE: 98 F | HEART RATE: 103 BPM | DIASTOLIC BLOOD PRESSURE: 64 MMHG | RESPIRATION RATE: 16 BRPM | OXYGEN SATURATION: 99 %

## 2021-02-12 DIAGNOSIS — D50.8 IRON DEFICIENCY ANEMIA SECONDARY TO INADEQUATE DIETARY IRON INTAKE: ICD-10-CM

## 2021-02-12 DIAGNOSIS — D50.0 IRON DEFICIENCY ANEMIA DUE TO CHRONIC BLOOD LOSS: Primary | ICD-10-CM

## 2021-02-12 DIAGNOSIS — K90.89 OTHER SPECIFIED INTESTINAL MALABSORPTION: ICD-10-CM

## 2021-02-12 PROCEDURE — 96374 THER/PROPH/DIAG INJ IV PUSH: CPT

## 2021-02-12 RX ORDER — CYANOCOBALAMIN 1000 UG/ML
1000 INJECTION INTRAMUSCULAR; SUBCUTANEOUS ONCE
Status: CANCELLED | OUTPATIENT
Start: 2021-02-19

## 2021-02-12 RX ORDER — CYANOCOBALAMIN 1000 UG/ML
1000 INJECTION INTRAMUSCULAR; SUBCUTANEOUS ONCE
Status: CANCELLED
Start: 2021-02-19 | End: 2021-02-19

## 2021-02-12 NOTE — PROGRESS NOTES
Pt to infusion for  Venofer 3/5. Pt arrived ambulating independently. Feeling well overall. Offers no complaints. Venofer given IVP over 5 mins and tolerated well. PIV flushed, removed, 2 x 2 and coban to site .  Discharged ambulatory with next appo

## 2021-02-15 ENCOUNTER — OFFICE VISIT (OUTPATIENT)
Dept: HEMATOLOGY/ONCOLOGY | Facility: HOSPITAL | Age: 36
End: 2021-02-15
Attending: INTERNAL MEDICINE
Payer: MEDICAID

## 2021-02-15 VITALS
DIASTOLIC BLOOD PRESSURE: 76 MMHG | HEART RATE: 100 BPM | RESPIRATION RATE: 16 BRPM | TEMPERATURE: 98 F | OXYGEN SATURATION: 99 % | SYSTOLIC BLOOD PRESSURE: 127 MMHG

## 2021-02-15 DIAGNOSIS — D50.0 IRON DEFICIENCY ANEMIA DUE TO CHRONIC BLOOD LOSS: Primary | ICD-10-CM

## 2021-02-15 DIAGNOSIS — D50.8 IRON DEFICIENCY ANEMIA SECONDARY TO INADEQUATE DIETARY IRON INTAKE: ICD-10-CM

## 2021-02-15 DIAGNOSIS — K90.89 OTHER SPECIFIED INTESTINAL MALABSORPTION: ICD-10-CM

## 2021-02-15 PROCEDURE — 96374 THER/PROPH/DIAG INJ IV PUSH: CPT

## 2021-02-15 RX ORDER — CYANOCOBALAMIN 1000 UG/ML
1000 INJECTION INTRAMUSCULAR; SUBCUTANEOUS ONCE
Status: CANCELLED
Start: 2021-02-19 | End: 2021-02-19

## 2021-02-15 RX ORDER — CYANOCOBALAMIN 1000 UG/ML
1000 INJECTION INTRAMUSCULAR; SUBCUTANEOUS ONCE
Status: CANCELLED | OUTPATIENT
Start: 2021-02-19

## 2021-02-15 NOTE — PROGRESS NOTES
Patient arrives for venofer 4th of 5 doses of 200mg. Patient reports feeling better with vitamin b12 injections which was started last week daily and will now be given weekly. PIV started with  positive blood return noted.  Venofer given slowly over

## 2021-02-18 ENCOUNTER — OFFICE VISIT (OUTPATIENT)
Dept: HEMATOLOGY/ONCOLOGY | Facility: HOSPITAL | Age: 36
End: 2021-02-18
Attending: INTERNAL MEDICINE
Payer: MEDICAID

## 2021-02-18 VITALS
SYSTOLIC BLOOD PRESSURE: 126 MMHG | OXYGEN SATURATION: 100 % | HEART RATE: 98 BPM | TEMPERATURE: 98 F | DIASTOLIC BLOOD PRESSURE: 73 MMHG | RESPIRATION RATE: 16 BRPM

## 2021-02-18 DIAGNOSIS — E53.8 B12 DEFICIENCY: ICD-10-CM

## 2021-02-18 DIAGNOSIS — K90.89 OTHER SPECIFIED INTESTINAL MALABSORPTION: ICD-10-CM

## 2021-02-18 DIAGNOSIS — D50.8 IRON DEFICIENCY ANEMIA SECONDARY TO INADEQUATE DIETARY IRON INTAKE: ICD-10-CM

## 2021-02-18 DIAGNOSIS — D50.0 IRON DEFICIENCY ANEMIA DUE TO CHRONIC BLOOD LOSS: Primary | ICD-10-CM

## 2021-02-18 PROCEDURE — 96374 THER/PROPH/DIAG INJ IV PUSH: CPT

## 2021-02-18 PROCEDURE — 96372 THER/PROPH/DIAG INJ SC/IM: CPT

## 2021-02-18 RX ORDER — CYANOCOBALAMIN 1000 UG/ML
1000 INJECTION INTRAMUSCULAR; SUBCUTANEOUS ONCE
Status: CANCELLED | OUTPATIENT
Start: 2021-02-19

## 2021-02-18 RX ORDER — CYANOCOBALAMIN 1000 UG/ML
1000 INJECTION INTRAMUSCULAR; SUBCUTANEOUS ONCE
Status: COMPLETED | OUTPATIENT
Start: 2021-02-18 | End: 2021-02-18

## 2021-02-18 RX ORDER — CYANOCOBALAMIN 1000 UG/ML
1000 INJECTION INTRAMUSCULAR; SUBCUTANEOUS ONCE
Status: DISCONTINUED | OUTPATIENT
Start: 2021-02-18 | End: 2021-02-18

## 2021-02-18 RX ORDER — CYANOCOBALAMIN 1000 UG/ML
1000 INJECTION INTRAMUSCULAR; SUBCUTANEOUS ONCE
Status: CANCELLED
Start: 2021-02-19 | End: 2021-02-19

## 2021-02-18 RX ORDER — CYANOCOBALAMIN 1000 UG/ML
1000 INJECTION INTRAMUSCULAR; SUBCUTANEOUS ONCE
Status: CANCELLED
Start: 2021-02-18 | End: 2021-02-18

## 2021-02-18 RX ORDER — CYANOCOBALAMIN 1000 UG/ML
INJECTION INTRAMUSCULAR; SUBCUTANEOUS
Status: COMPLETED
Start: 2021-02-18 | End: 2021-02-18

## 2021-02-18 RX ADMIN — CYANOCOBALAMIN 1000 MCG: 1000 INJECTION INTRAMUSCULAR; SUBCUTANEOUS at 14:20:00

## 2021-02-18 NOTE — PROGRESS NOTES
Patient arrives for venofer 5th of 5 doses of 200mg. Weekly b12 injection given today ( was previously daily now weekly for 4 doses and then will be monthly)    PIV started with  positive blood return noted.  Venofer given slowly over 5 minutes, positive

## 2021-02-25 ENCOUNTER — NURSE ONLY (OUTPATIENT)
Dept: HEMATOLOGY/ONCOLOGY | Facility: HOSPITAL | Age: 36
End: 2021-02-25
Attending: INTERNAL MEDICINE
Payer: MEDICAID

## 2021-02-25 DIAGNOSIS — D50.8 IRON DEFICIENCY ANEMIA SECONDARY TO INADEQUATE DIETARY IRON INTAKE: ICD-10-CM

## 2021-02-25 DIAGNOSIS — K90.89 OTHER SPECIFIED INTESTINAL MALABSORPTION: ICD-10-CM

## 2021-02-25 DIAGNOSIS — D50.0 IRON DEFICIENCY ANEMIA DUE TO CHRONIC BLOOD LOSS: Primary | ICD-10-CM

## 2021-02-25 PROCEDURE — 96372 THER/PROPH/DIAG INJ SC/IM: CPT

## 2021-02-25 RX ORDER — CYANOCOBALAMIN 1000 UG/ML
1000 INJECTION INTRAMUSCULAR; SUBCUTANEOUS ONCE
Status: COMPLETED | OUTPATIENT
Start: 2021-02-25 | End: 2021-02-25

## 2021-02-25 RX ORDER — CYANOCOBALAMIN 1000 UG/ML
INJECTION INTRAMUSCULAR; SUBCUTANEOUS
Status: DISCONTINUED
Start: 2021-02-25 | End: 2021-02-25

## 2021-02-25 RX ORDER — CYANOCOBALAMIN 1000 UG/ML
1000 INJECTION INTRAMUSCULAR; SUBCUTANEOUS ONCE
Status: CANCELLED
Start: 2021-03-04 | End: 2021-03-04

## 2021-02-25 RX ADMIN — CYANOCOBALAMIN 1000 MCG: 1000 INJECTION INTRAMUSCULAR; SUBCUTANEOUS at 14:02:00

## 2021-02-25 NOTE — PROGRESS NOTES
Nahed Cartermellissa is here for day 3 of 4 B12 injections  He is feeling very well today  He is in good spirits  States he feels less tired so far since iron/ B12    Reviewed scheduling including weekly then monthly B12 inj sequencing then follow up labs/ MD visit in A

## 2021-03-04 ENCOUNTER — NURSE ONLY (OUTPATIENT)
Dept: HEMATOLOGY/ONCOLOGY | Facility: HOSPITAL | Age: 36
End: 2021-03-04
Attending: INTERNAL MEDICINE
Payer: MEDICAID

## 2021-03-04 DIAGNOSIS — D50.0 IRON DEFICIENCY ANEMIA DUE TO CHRONIC BLOOD LOSS: Primary | ICD-10-CM

## 2021-03-04 DIAGNOSIS — K90.89 OTHER SPECIFIED INTESTINAL MALABSORPTION: ICD-10-CM

## 2021-03-04 DIAGNOSIS — D50.8 IRON DEFICIENCY ANEMIA SECONDARY TO INADEQUATE DIETARY IRON INTAKE: ICD-10-CM

## 2021-03-04 PROCEDURE — 96372 THER/PROPH/DIAG INJ SC/IM: CPT

## 2021-03-04 RX ORDER — CYANOCOBALAMIN 1000 UG/ML
1000 INJECTION INTRAMUSCULAR; SUBCUTANEOUS ONCE
Status: COMPLETED | OUTPATIENT
Start: 2021-03-04 | End: 2021-03-04

## 2021-03-04 RX ORDER — CYANOCOBALAMIN 1000 UG/ML
INJECTION INTRAMUSCULAR; SUBCUTANEOUS
Status: COMPLETED
Start: 2021-03-04 | End: 2021-03-04

## 2021-03-04 RX ORDER — CYANOCOBALAMIN 1000 UG/ML
1000 INJECTION INTRAMUSCULAR; SUBCUTANEOUS ONCE
Status: CANCELLED
Start: 2021-03-11 | End: 2021-03-11

## 2021-03-04 RX ADMIN — CYANOCOBALAMIN 1000 MCG: 1000 INJECTION INTRAMUSCULAR; SUBCUTANEOUS at 14:08:00

## 2021-03-04 NOTE — PROGRESS NOTES
Weekly B12 3/4 given in R deltoid and tolerated well. Site covered with bandaid. Discharged with next appointment.

## 2021-03-11 ENCOUNTER — NURSE ONLY (OUTPATIENT)
Dept: HEMATOLOGY/ONCOLOGY | Facility: HOSPITAL | Age: 36
End: 2021-03-11
Attending: INTERNAL MEDICINE
Payer: MEDICAID

## 2021-03-11 DIAGNOSIS — D50.8 IRON DEFICIENCY ANEMIA SECONDARY TO INADEQUATE DIETARY IRON INTAKE: ICD-10-CM

## 2021-03-11 DIAGNOSIS — D50.0 IRON DEFICIENCY ANEMIA DUE TO CHRONIC BLOOD LOSS: Primary | ICD-10-CM

## 2021-03-11 DIAGNOSIS — K90.89 OTHER SPECIFIED INTESTINAL MALABSORPTION: ICD-10-CM

## 2021-03-11 PROCEDURE — 96372 THER/PROPH/DIAG INJ SC/IM: CPT

## 2021-03-11 RX ORDER — CYANOCOBALAMIN 1000 UG/ML
INJECTION INTRAMUSCULAR; SUBCUTANEOUS
Status: COMPLETED
Start: 2021-03-11 | End: 2021-03-11

## 2021-03-11 RX ORDER — CYANOCOBALAMIN 1000 UG/ML
1000 INJECTION INTRAMUSCULAR; SUBCUTANEOUS ONCE
Status: COMPLETED | OUTPATIENT
Start: 2021-03-11 | End: 2021-03-11

## 2021-03-11 RX ORDER — CYANOCOBALAMIN 1000 UG/ML
1000 INJECTION INTRAMUSCULAR; SUBCUTANEOUS ONCE
Status: CANCELLED
Start: 2021-03-18 | End: 2021-03-18

## 2021-03-11 RX ADMIN — CYANOCOBALAMIN 1000 MCG: 1000 INJECTION INTRAMUSCULAR; SUBCUTANEOUS at 14:04:00

## 2021-03-11 NOTE — PROGRESS NOTES
Weekly B12 4/4 given in L deltoid and tolerated well. Site covered with bandaid. Discharged with next appointment.

## 2021-03-12 DIAGNOSIS — Z23 NEED FOR VACCINATION: ICD-10-CM

## 2021-03-15 ENCOUNTER — IMMUNIZATION (OUTPATIENT)
Dept: LAB | Age: 36
End: 2021-03-15
Attending: HOSPITALIST
Payer: MEDICAID

## 2021-03-15 DIAGNOSIS — Z23 NEED FOR VACCINATION: Primary | ICD-10-CM

## 2021-03-15 PROCEDURE — 0001A SARSCOV2 VAC 30MCG/0.3ML IM: CPT

## 2021-04-05 ENCOUNTER — IMMUNIZATION (OUTPATIENT)
Dept: LAB | Age: 36
End: 2021-04-05
Attending: HOSPITALIST
Payer: MEDICAID

## 2021-04-05 DIAGNOSIS — Z23 NEED FOR VACCINATION: Primary | ICD-10-CM

## 2021-04-05 PROCEDURE — 0002A SARSCOV2 VAC 30MCG/0.3ML IM: CPT

## 2021-04-08 ENCOUNTER — NURSE ONLY (OUTPATIENT)
Dept: HEMATOLOGY/ONCOLOGY | Facility: HOSPITAL | Age: 36
End: 2021-04-08
Attending: INTERNAL MEDICINE
Payer: MEDICAID

## 2021-04-08 VITALS
RESPIRATION RATE: 16 BRPM | HEART RATE: 89 BPM | OXYGEN SATURATION: 99 % | SYSTOLIC BLOOD PRESSURE: 116 MMHG | DIASTOLIC BLOOD PRESSURE: 72 MMHG | TEMPERATURE: 98 F

## 2021-04-08 DIAGNOSIS — D50.8 IRON DEFICIENCY ANEMIA SECONDARY TO INADEQUATE DIETARY IRON INTAKE: ICD-10-CM

## 2021-04-08 DIAGNOSIS — K90.89 OTHER SPECIFIED INTESTINAL MALABSORPTION: ICD-10-CM

## 2021-04-08 DIAGNOSIS — D50.0 IRON DEFICIENCY ANEMIA DUE TO CHRONIC BLOOD LOSS: Primary | ICD-10-CM

## 2021-04-08 PROCEDURE — 96372 THER/PROPH/DIAG INJ SC/IM: CPT

## 2021-04-08 RX ORDER — CYANOCOBALAMIN 1000 UG/ML
1000 INJECTION INTRAMUSCULAR; SUBCUTANEOUS ONCE
Status: CANCELLED
Start: 2021-04-15 | End: 2021-04-15

## 2021-04-08 RX ORDER — CYANOCOBALAMIN 1000 UG/ML
1000 INJECTION INTRAMUSCULAR; SUBCUTANEOUS ONCE
Status: COMPLETED | OUTPATIENT
Start: 2021-04-08 | End: 2021-04-08

## 2021-04-08 RX ORDER — CYANOCOBALAMIN 1000 UG/ML
INJECTION INTRAMUSCULAR; SUBCUTANEOUS
Status: COMPLETED
Start: 2021-04-08 | End: 2021-04-08

## 2021-04-08 RX ADMIN — CYANOCOBALAMIN 1000 MCG: 1000 INJECTION INTRAMUSCULAR; SUBCUTANEOUS at 14:09:00

## 2021-04-08 NOTE — PROGRESS NOTES
Patient here to get monthly B 12. Patient with no complaints today. B 12 given IM in Right deltoid, tolerated injection well. Site covered with a band-aide.     Discharged ambulatory, reviewed future appointments with patient, did not want AVS, states he

## 2021-04-29 ENCOUNTER — APPOINTMENT (OUTPATIENT)
Dept: HEMATOLOGY/ONCOLOGY | Facility: HOSPITAL | Age: 36
End: 2021-04-29
Attending: INTERNAL MEDICINE
Payer: MEDICAID

## 2021-04-29 ENCOUNTER — TELEPHONE (OUTPATIENT)
Dept: HEMATOLOGY/ONCOLOGY | Facility: HOSPITAL | Age: 36
End: 2021-04-29

## 2021-04-29 NOTE — TELEPHONE ENCOUNTER
Patient is cancelling his appointment for today for Labs and Dr. Taniya Austin. he had a family emergency and he is rescheduling.

## 2021-05-03 ENCOUNTER — NURSE ONLY (OUTPATIENT)
Dept: HEMATOLOGY/ONCOLOGY | Facility: HOSPITAL | Age: 36
End: 2021-05-03
Attending: INTERNAL MEDICINE
Payer: MEDICAID

## 2021-05-03 VITALS
BODY MASS INDEX: 24.17 KG/M2 | SYSTOLIC BLOOD PRESSURE: 114 MMHG | WEIGHT: 154 LBS | HEIGHT: 67 IN | TEMPERATURE: 98 F | DIASTOLIC BLOOD PRESSURE: 74 MMHG | OXYGEN SATURATION: 99 % | HEART RATE: 109 BPM | RESPIRATION RATE: 16 BRPM

## 2021-05-03 DIAGNOSIS — E53.8 B12 DEFICIENCY: ICD-10-CM

## 2021-05-03 DIAGNOSIS — K90.89 OTHER SPECIFIED INTESTINAL MALABSORPTION: ICD-10-CM

## 2021-05-03 DIAGNOSIS — E55.9 VITAMIN D DEFICIENCY: ICD-10-CM

## 2021-05-03 DIAGNOSIS — D50.0 IRON DEFICIENCY ANEMIA DUE TO CHRONIC BLOOD LOSS: ICD-10-CM

## 2021-05-03 DIAGNOSIS — Z51.81 MEDICATION MONITORING ENCOUNTER: ICD-10-CM

## 2021-05-03 DIAGNOSIS — D50.0 IRON DEFICIENCY ANEMIA DUE TO CHRONIC BLOOD LOSS: Primary | ICD-10-CM

## 2021-05-03 PROCEDURE — 85060 BLOOD SMEAR INTERPRETATION: CPT

## 2021-05-03 PROCEDURE — 36415 COLL VENOUS BLD VENIPUNCTURE: CPT

## 2021-05-03 PROCEDURE — 83540 ASSAY OF IRON: CPT

## 2021-05-03 PROCEDURE — 82728 ASSAY OF FERRITIN: CPT

## 2021-05-03 PROCEDURE — 84466 ASSAY OF TRANSFERRIN: CPT

## 2021-05-03 PROCEDURE — 99214 OFFICE O/P EST MOD 30 MIN: CPT | Performed by: INTERNAL MEDICINE

## 2021-05-03 PROCEDURE — 82607 VITAMIN B-12: CPT

## 2021-05-03 PROCEDURE — 85025 COMPLETE CBC W/AUTO DIFF WBC: CPT

## 2021-05-03 NOTE — PROGRESS NOTES
FLOR Espinosa Bhupendra is a 28year old male who is here today for follow-up of of Iron deficiency anemia due to chronic blood loss  (primary encounter diagnosis)  Other specified intestinal malabsorption  B12 deficiency  Medication monitoring encount (two) times daily. • glipiZIDE 10 MG Oral Tab Take 10 mg by mouth 2 (two) times daily. • JANUVIA 50 MG Oral Tab Take 50 mg by mouth daily.        Allergies:     Infed [Iron Dextran]    HIVES  Ciprofloxacin               Comment:Other reaction(s): bu (primary encounter diagnosis)  Other specified intestinal malabsorption  B12 deficiency  Medication monitoring encounter    Patient has anemia due to chronic iron deficiency secondary to poor absorption as a sequela of Chron's disease and its management. RBC 6.18 (H) 4.30 - 5.70 x10(6)uL    HGB 12.6 (L) 13.0 - 17.5 g/dL    HCT 42.6 39.0 - 53.0 %    MCV 68.9 (L) 80.0 - 100.0 fL    MCH 20.4 (L) 26.0 - 34.0 pg    MCHC 29.6 (L) 31.0 - 37.0 g/dL    RDW-SD 50.5 (H) 35.1 - 46.3 fL    RDW 21.2 (H) 11.0 - 15.0 % Component      Latest Ref Rng & Units 5/3/2021 12/5/2020 10/3/2017   WBC      4.0 - 11.0 x10(3) uL 9.1 8.1 8.8   RBC      4.30 - 5.70 x10(6)uL 6.18 (H) 5.58 5.38   Hemoglobin      13.0 - 17.5 g/dL 12.6 (L) 8.6 (L) 12.2 (L)   Hematocrit      39.0 - 53. 0

## 2021-05-06 ENCOUNTER — OFFICE VISIT (OUTPATIENT)
Dept: HEMATOLOGY/ONCOLOGY | Facility: HOSPITAL | Age: 36
End: 2021-05-06
Attending: INTERNAL MEDICINE
Payer: MEDICAID

## 2021-05-06 VITALS
HEART RATE: 102 BPM | DIASTOLIC BLOOD PRESSURE: 70 MMHG | OXYGEN SATURATION: 100 % | SYSTOLIC BLOOD PRESSURE: 129 MMHG | TEMPERATURE: 98 F | RESPIRATION RATE: 16 BRPM

## 2021-05-06 DIAGNOSIS — K90.89 OTHER SPECIFIED INTESTINAL MALABSORPTION: ICD-10-CM

## 2021-05-06 DIAGNOSIS — D50.0 IRON DEFICIENCY ANEMIA DUE TO CHRONIC BLOOD LOSS: Primary | ICD-10-CM

## 2021-05-06 DIAGNOSIS — D50.8 IRON DEFICIENCY ANEMIA SECONDARY TO INADEQUATE DIETARY IRON INTAKE: ICD-10-CM

## 2021-05-06 PROCEDURE — 96374 THER/PROPH/DIAG INJ IV PUSH: CPT

## 2021-05-06 PROCEDURE — 96372 THER/PROPH/DIAG INJ SC/IM: CPT

## 2021-05-06 RX ORDER — CYANOCOBALAMIN 1000 UG/ML
1000 INJECTION INTRAMUSCULAR; SUBCUTANEOUS ONCE
Status: CANCELLED
Start: 2021-06-03 | End: 2021-06-03

## 2021-05-06 RX ORDER — CYANOCOBALAMIN 1000 UG/ML
INJECTION INTRAMUSCULAR; SUBCUTANEOUS
Status: COMPLETED
Start: 2021-05-06 | End: 2021-05-06

## 2021-05-06 RX ORDER — CYANOCOBALAMIN 1000 UG/ML
1000 INJECTION INTRAMUSCULAR; SUBCUTANEOUS ONCE
Status: COMPLETED | OUTPATIENT
Start: 2021-05-06 | End: 2021-05-06

## 2021-05-06 RX ADMIN — CYANOCOBALAMIN 1000 MCG: 1000 INJECTION INTRAMUSCULAR; SUBCUTANEOUS at 14:04:00

## 2021-05-06 NOTE — PROGRESS NOTES
Ariel to infusion today for  Vitamin B12 and 1 of 5 200mg Venofer. He arrives alert and independent. Patient has been tolerating B12 injections well. He has received Venofer in the past and reports he did fine with it.         PIV started to left [facility] with betsey

## 2021-05-12 ENCOUNTER — OFFICE VISIT (OUTPATIENT)
Dept: HEMATOLOGY/ONCOLOGY | Facility: HOSPITAL | Age: 36
End: 2021-05-12
Attending: INTERNAL MEDICINE
Payer: MEDICAID

## 2021-05-12 VITALS
TEMPERATURE: 98 F | OXYGEN SATURATION: 99 % | HEART RATE: 92 BPM | DIASTOLIC BLOOD PRESSURE: 72 MMHG | SYSTOLIC BLOOD PRESSURE: 125 MMHG | RESPIRATION RATE: 16 BRPM

## 2021-05-12 DIAGNOSIS — D50.0 IRON DEFICIENCY ANEMIA DUE TO CHRONIC BLOOD LOSS: Primary | ICD-10-CM

## 2021-05-12 DIAGNOSIS — D50.8 IRON DEFICIENCY ANEMIA SECONDARY TO INADEQUATE DIETARY IRON INTAKE: ICD-10-CM

## 2021-05-12 DIAGNOSIS — K90.89 OTHER SPECIFIED INTESTINAL MALABSORPTION: ICD-10-CM

## 2021-05-12 PROCEDURE — 96374 THER/PROPH/DIAG INJ IV PUSH: CPT

## 2021-05-12 RX ORDER — CYANOCOBALAMIN 1000 UG/ML
1000 INJECTION INTRAMUSCULAR; SUBCUTANEOUS ONCE
Status: CANCELLED
Start: 2021-06-02 | End: 2021-06-02

## 2021-05-12 NOTE — PROGRESS NOTES
Ariel to infusion today for #2 of 5 Venofer infusions. Iron sat on 5/2 was 6%. He arrives alert and independent. No problems after previous venofer infusions. PIV started to left [facility] with good blood return noted.  Venofer given slow IVP , positive bloo

## 2021-05-14 ENCOUNTER — TELEPHONE (OUTPATIENT)
Dept: HEMATOLOGY/ONCOLOGY | Facility: HOSPITAL | Age: 36
End: 2021-05-14

## 2021-05-14 NOTE — TELEPHONE ENCOUNTER
Called and spoke with patient today, patient had appointment scheduled at  for Venofer. Patient states he got a sore throat last night, no fever, called his PCP was prescribed antibiotics. States his PCP recommended he not come today for Venofer.

## 2021-05-17 ENCOUNTER — APPOINTMENT (OUTPATIENT)
Dept: HEMATOLOGY/ONCOLOGY | Facility: HOSPITAL | Age: 36
End: 2021-05-17
Attending: INTERNAL MEDICINE
Payer: MEDICAID

## 2021-05-19 ENCOUNTER — OFFICE VISIT (OUTPATIENT)
Dept: HEMATOLOGY/ONCOLOGY | Facility: HOSPITAL | Age: 36
End: 2021-05-19
Attending: INTERNAL MEDICINE
Payer: MEDICAID

## 2021-05-19 VITALS
OXYGEN SATURATION: 98 % | TEMPERATURE: 98 F | HEART RATE: 104 BPM | DIASTOLIC BLOOD PRESSURE: 75 MMHG | RESPIRATION RATE: 16 BRPM | SYSTOLIC BLOOD PRESSURE: 114 MMHG

## 2021-05-19 DIAGNOSIS — D50.8 IRON DEFICIENCY ANEMIA SECONDARY TO INADEQUATE DIETARY IRON INTAKE: ICD-10-CM

## 2021-05-19 DIAGNOSIS — D50.0 IRON DEFICIENCY ANEMIA DUE TO CHRONIC BLOOD LOSS: Primary | ICD-10-CM

## 2021-05-19 DIAGNOSIS — K90.89 OTHER SPECIFIED INTESTINAL MALABSORPTION: ICD-10-CM

## 2021-05-19 PROCEDURE — 96374 THER/PROPH/DIAG INJ IV PUSH: CPT

## 2021-05-19 RX ORDER — CYANOCOBALAMIN 1000 UG/ML
1000 INJECTION INTRAMUSCULAR; SUBCUTANEOUS ONCE
Status: CANCELLED
Start: 2021-06-02 | End: 2021-06-02

## 2021-05-19 NOTE — PROGRESS NOTES
Ariel to infusion today for #3 of 5 Venofer infusions. Iron sat on 5/2 was 6%. He arrives alert and independent. No problems after previous venofer infusions. PIV started to left forearm with good blood return noted.  Venofer given slow IVP , positive

## 2021-05-24 ENCOUNTER — OFFICE VISIT (OUTPATIENT)
Dept: HEMATOLOGY/ONCOLOGY | Facility: HOSPITAL | Age: 36
End: 2021-05-24
Attending: INTERNAL MEDICINE
Payer: MEDICAID

## 2021-05-24 VITALS
HEART RATE: 99 BPM | SYSTOLIC BLOOD PRESSURE: 120 MMHG | TEMPERATURE: 99 F | RESPIRATION RATE: 16 BRPM | DIASTOLIC BLOOD PRESSURE: 77 MMHG | OXYGEN SATURATION: 99 %

## 2021-05-24 DIAGNOSIS — D50.8 IRON DEFICIENCY ANEMIA SECONDARY TO INADEQUATE DIETARY IRON INTAKE: ICD-10-CM

## 2021-05-24 DIAGNOSIS — D50.0 IRON DEFICIENCY ANEMIA DUE TO CHRONIC BLOOD LOSS: Primary | ICD-10-CM

## 2021-05-24 DIAGNOSIS — K90.89 OTHER SPECIFIED INTESTINAL MALABSORPTION: ICD-10-CM

## 2021-05-24 PROCEDURE — 96374 THER/PROPH/DIAG INJ IV PUSH: CPT

## 2021-05-24 RX ORDER — CYANOCOBALAMIN 1000 UG/ML
1000 INJECTION INTRAMUSCULAR; SUBCUTANEOUS ONCE
Status: CANCELLED
Start: 2021-06-02 | End: 2021-06-02

## 2021-05-24 NOTE — PROGRESS NOTES
Ariel to infusion today for 4 of 5  Venofer. He arrives alert and independent. Feeling well, recently finished oral antibiotic. States he tolerated Venofer well.     Most recent labs- 5/3/21- Hgb/Hct: 12.6/42.6    Ferritin: 48.3    Fe: 22    TIBC: 365    %

## 2021-06-03 ENCOUNTER — APPOINTMENT (OUTPATIENT)
Dept: HEMATOLOGY/ONCOLOGY | Facility: HOSPITAL | Age: 36
End: 2021-06-03
Attending: INTERNAL MEDICINE
Payer: MEDICAID

## 2021-08-03 ENCOUNTER — NURSE ONLY (OUTPATIENT)
Dept: HEMATOLOGY/ONCOLOGY | Facility: HOSPITAL | Age: 36
End: 2021-08-03
Attending: INTERNAL MEDICINE
Payer: MEDICAID

## 2021-08-03 VITALS
DIASTOLIC BLOOD PRESSURE: 71 MMHG | SYSTOLIC BLOOD PRESSURE: 116 MMHG | RESPIRATION RATE: 16 BRPM | BODY MASS INDEX: 24.8 KG/M2 | WEIGHT: 158 LBS | HEIGHT: 67 IN | OXYGEN SATURATION: 97 % | HEART RATE: 98 BPM | TEMPERATURE: 99 F

## 2021-08-03 DIAGNOSIS — D50.0 IRON DEFICIENCY ANEMIA DUE TO CHRONIC BLOOD LOSS: ICD-10-CM

## 2021-08-03 DIAGNOSIS — E55.9 VITAMIN D DEFICIENCY: ICD-10-CM

## 2021-08-03 DIAGNOSIS — E53.8 B12 DEFICIENCY: ICD-10-CM

## 2021-08-03 DIAGNOSIS — K90.89 OTHER SPECIFIED INTESTINAL MALABSORPTION: ICD-10-CM

## 2021-08-03 DIAGNOSIS — D50.0 IRON DEFICIENCY ANEMIA DUE TO CHRONIC BLOOD LOSS: Primary | ICD-10-CM

## 2021-08-03 LAB
BASOPHILS # BLD AUTO: 0.08 X10(3) UL (ref 0–0.2)
BASOPHILS NFR BLD AUTO: 0.7 %
DEPRECATED HBV CORE AB SER IA-ACNC: 112.1 NG/ML
DEPRECATED RDW RBC AUTO: 42.7 FL (ref 35.1–46.3)
EOSINOPHIL # BLD AUTO: 0.12 X10(3) UL (ref 0–0.7)
EOSINOPHIL NFR BLD AUTO: 1.1 %
ERYTHROCYTE [DISTWIDTH] IN BLOOD BY AUTOMATED COUNT: 15.5 % (ref 11–15)
HCT VFR BLD AUTO: 42.5 %
HGB BLD-MCNC: 13.3 G/DL
IMM GRANULOCYTES # BLD AUTO: 0.06 X10(3) UL (ref 0–1)
IMM GRANULOCYTES NFR BLD: 0.6 %
IRON SATURATION: 11 %
IRON SERPL-MCNC: 30 UG/DL
LYMPHOCYTES # BLD AUTO: 1.87 X10(3) UL (ref 1–4)
LYMPHOCYTES NFR BLD AUTO: 17.2 %
MCH RBC QN AUTO: 23.7 PG (ref 26–34)
MCHC RBC AUTO-ENTMCNC: 31.3 G/DL (ref 31–37)
MCV RBC AUTO: 75.8 FL
MONOCYTES # BLD AUTO: 0.7 X10(3) UL (ref 0.1–1)
MONOCYTES NFR BLD AUTO: 6.4 %
NEUTROPHILS # BLD AUTO: 8.05 X10 (3) UL (ref 1.5–7.7)
NEUTROPHILS # BLD AUTO: 8.05 X10(3) UL (ref 1.5–7.7)
NEUTROPHILS NFR BLD AUTO: 74 %
PLATELET # BLD AUTO: 285 10(3)UL (ref 150–450)
RBC # BLD AUTO: 5.61 X10(6)UL
TOTAL IRON BINDING CAPACITY: 267 UG/DL (ref 240–450)
TRANSFERRIN SERPL-MCNC: 179 MG/DL (ref 200–360)
VIT D+METAB SERPL-MCNC: 11.8 NG/ML (ref 30–100)
WBC # BLD AUTO: 10.9 X10(3) UL (ref 4–11)

## 2021-08-03 PROCEDURE — 36415 COLL VENOUS BLD VENIPUNCTURE: CPT

## 2021-08-03 PROCEDURE — 82728 ASSAY OF FERRITIN: CPT

## 2021-08-03 PROCEDURE — 85025 COMPLETE CBC W/AUTO DIFF WBC: CPT

## 2021-08-03 PROCEDURE — 82306 VITAMIN D 25 HYDROXY: CPT

## 2021-08-03 PROCEDURE — 84466 ASSAY OF TRANSFERRIN: CPT

## 2021-08-03 PROCEDURE — 83540 ASSAY OF IRON: CPT

## 2021-08-03 PROCEDURE — 99213 OFFICE O/P EST LOW 20 MIN: CPT | Performed by: INTERNAL MEDICINE

## 2021-08-03 RX ORDER — ERGOCALCIFEROL 1.25 MG/1
50000 CAPSULE ORAL WEEKLY
Qty: 4 CAPSULE | Refills: 3 | Status: SHIPPED | OUTPATIENT
Start: 2021-08-03

## 2021-08-03 NOTE — PROGRESS NOTES
FLOR Ocasio Daughters is a 28year old male who is here today for follow-up of of Iron deficiency anemia due to chronic blood loss  (primary encounter diagnosis)  Other specified intestinal malabsorption  B12 deficiency       Iron therapy:  by Aura Sánchez times daily. • glipiZIDE 10 MG Oral Tab Take 10 mg by mouth 2 (two) times daily. • JANUVIA 50 MG Oral Tab Take 50 mg by mouth daily.        Allergies:     Infed [Iron Dextran]    HIVES  Ciprofloxacin               Comment:Other reaction(s): burning chronic blood loss  (primary encounter diagnosis)  Other specified intestinal malabsorption  B12 deficiency    Patient has anemia due to chronic iron deficiency secondary to poor absorption as a sequela of Chron's disease and its management.   He has had pa (L) 200 - 360 mg/dL    Total Iron Binding Capacity 267 240 - 450 ug/dL    % Saturation 11 (L) 20 - 50 %   VITAMIN D, 25-HYDROXY    Collection Time: 08/03/21 11:23 AM   Result Value Ref Range    Vitamin D, 25OH, Total 11.8 (L) 30.0 - 100.0 ng/mL   CBC W/ DI Eosinophils Absolute      0.00 - 0.70 x10(3) uL 0.12 0.19   Basophils Absolute      0.00 - 0.20 x10(3) uL 0.08 0.06   Immature Granulocyte Absolute      0.00 - 1.00 x10(3) uL 0.06 0.04   Neutrophils %      % 74.0 63.3   Lymphocytes %      % 17.2 24.3   M

## 2021-08-04 ENCOUNTER — TELEPHONE (OUTPATIENT)
Dept: HEMATOLOGY/ONCOLOGY | Facility: HOSPITAL | Age: 36
End: 2021-08-04

## 2021-08-06 ENCOUNTER — OFFICE VISIT (OUTPATIENT)
Dept: HEMATOLOGY/ONCOLOGY | Facility: HOSPITAL | Age: 36
End: 2021-08-06
Attending: INTERNAL MEDICINE
Payer: MEDICAID

## 2021-08-06 VITALS
SYSTOLIC BLOOD PRESSURE: 120 MMHG | OXYGEN SATURATION: 96 % | RESPIRATION RATE: 16 BRPM | DIASTOLIC BLOOD PRESSURE: 68 MMHG | TEMPERATURE: 98 F | HEART RATE: 101 BPM

## 2021-08-06 DIAGNOSIS — K90.89 OTHER SPECIFIED INTESTINAL MALABSORPTION: ICD-10-CM

## 2021-08-06 DIAGNOSIS — D50.8 IRON DEFICIENCY ANEMIA SECONDARY TO INADEQUATE DIETARY IRON INTAKE: ICD-10-CM

## 2021-08-06 DIAGNOSIS — D50.0 IRON DEFICIENCY ANEMIA DUE TO CHRONIC BLOOD LOSS: Primary | ICD-10-CM

## 2021-08-06 PROCEDURE — 96372 THER/PROPH/DIAG INJ SC/IM: CPT

## 2021-08-06 PROCEDURE — 96374 THER/PROPH/DIAG INJ IV PUSH: CPT

## 2021-08-06 RX ORDER — CYANOCOBALAMIN 1000 UG/ML
1000 INJECTION INTRAMUSCULAR; SUBCUTANEOUS ONCE
Status: CANCELLED
Start: 2021-08-27 | End: 2021-08-27

## 2021-08-06 RX ORDER — CYANOCOBALAMIN 1000 UG/ML
1000 INJECTION INTRAMUSCULAR; SUBCUTANEOUS ONCE
Status: COMPLETED | OUTPATIENT
Start: 2021-08-06 | End: 2021-08-06

## 2021-08-06 RX ORDER — CYANOCOBALAMIN 1000 UG/ML
INJECTION INTRAMUSCULAR; SUBCUTANEOUS
Status: COMPLETED
Start: 2021-08-06 | End: 2021-08-06

## 2021-08-06 RX ADMIN — CYANOCOBALAMIN 1000 MCG: 1000 INJECTION INTRAMUSCULAR; SUBCUTANEOUS at 16:32:00

## 2021-08-06 NOTE — PROGRESS NOTES
Ariel to infusion today for  Vitamin B12 and 200mg Venofer. He arrives alert and independent. Patient has tolerated both medications previously. Recent labs revealed recurred anemia. Plan for monthly venofer and B12 and will see MD in November.       BECKI delgadillo

## 2021-09-03 ENCOUNTER — OFFICE VISIT (OUTPATIENT)
Dept: HEMATOLOGY/ONCOLOGY | Facility: HOSPITAL | Age: 36
End: 2021-09-03
Attending: INTERNAL MEDICINE
Payer: MEDICAID

## 2021-09-03 VITALS
HEART RATE: 95 BPM | DIASTOLIC BLOOD PRESSURE: 79 MMHG | OXYGEN SATURATION: 100 % | SYSTOLIC BLOOD PRESSURE: 121 MMHG | TEMPERATURE: 98 F

## 2021-09-03 DIAGNOSIS — D50.8 IRON DEFICIENCY ANEMIA SECONDARY TO INADEQUATE DIETARY IRON INTAKE: ICD-10-CM

## 2021-09-03 DIAGNOSIS — K90.89 OTHER SPECIFIED INTESTINAL MALABSORPTION: ICD-10-CM

## 2021-09-03 DIAGNOSIS — D50.0 IRON DEFICIENCY ANEMIA DUE TO CHRONIC BLOOD LOSS: Primary | ICD-10-CM

## 2021-09-03 PROCEDURE — 96374 THER/PROPH/DIAG INJ IV PUSH: CPT

## 2021-09-03 PROCEDURE — 96372 THER/PROPH/DIAG INJ SC/IM: CPT

## 2021-09-03 RX ORDER — CYANOCOBALAMIN 1000 UG/ML
INJECTION INTRAMUSCULAR; SUBCUTANEOUS
Status: COMPLETED
Start: 2021-09-03 | End: 2021-09-03

## 2021-09-03 RX ORDER — CYANOCOBALAMIN 1000 UG/ML
1000 INJECTION INTRAMUSCULAR; SUBCUTANEOUS ONCE
Status: COMPLETED | OUTPATIENT
Start: 2021-09-03 | End: 2021-09-03

## 2021-09-03 RX ORDER — CYANOCOBALAMIN 1000 UG/ML
1000 INJECTION INTRAMUSCULAR; SUBCUTANEOUS ONCE
Status: CANCELLED
Start: 2021-10-01 | End: 2021-10-01

## 2021-09-03 RX ADMIN — CYANOCOBALAMIN 1000 MCG: 1000 INJECTION INTRAMUSCULAR; SUBCUTANEOUS at 16:28:00

## 2021-09-03 NOTE — PROGRESS NOTES
Monthly [id number] and venofer 200mg given today for iron deficiency anemia. Has a history of chron's disease. B12 injection to left deltoid, no bleeding noted, injection site covered with bandaid. Shujah appeared to tolerate.     PIV established with

## 2021-09-06 ENCOUNTER — IMMUNIZATION (OUTPATIENT)
Dept: LAB | Facility: HOSPITAL | Age: 36
End: 2021-09-06
Attending: EMERGENCY MEDICINE
Payer: MEDICAID

## 2021-09-06 DIAGNOSIS — Z23 NEED FOR VACCINATION: Primary | ICD-10-CM

## 2021-09-06 PROCEDURE — 0003A SARSCOV2 VAC 30MCG/0.3ML IM: CPT

## 2021-10-01 ENCOUNTER — OFFICE VISIT (OUTPATIENT)
Dept: HEMATOLOGY/ONCOLOGY | Facility: HOSPITAL | Age: 36
End: 2021-10-01
Attending: INTERNAL MEDICINE
Payer: MEDICAID

## 2021-10-01 VITALS
HEART RATE: 102 BPM | RESPIRATION RATE: 16 BRPM | DIASTOLIC BLOOD PRESSURE: 68 MMHG | SYSTOLIC BLOOD PRESSURE: 113 MMHG | OXYGEN SATURATION: 97 % | TEMPERATURE: 98 F

## 2021-10-01 DIAGNOSIS — K90.89 OTHER SPECIFIED INTESTINAL MALABSORPTION: ICD-10-CM

## 2021-10-01 DIAGNOSIS — D50.0 IRON DEFICIENCY ANEMIA DUE TO CHRONIC BLOOD LOSS: Primary | ICD-10-CM

## 2021-10-01 DIAGNOSIS — D50.8 IRON DEFICIENCY ANEMIA SECONDARY TO INADEQUATE DIETARY IRON INTAKE: ICD-10-CM

## 2021-10-01 PROCEDURE — 96374 THER/PROPH/DIAG INJ IV PUSH: CPT

## 2021-10-01 PROCEDURE — 96372 THER/PROPH/DIAG INJ SC/IM: CPT

## 2021-10-01 RX ORDER — CYANOCOBALAMIN 1000 UG/ML
INJECTION INTRAMUSCULAR; SUBCUTANEOUS
Status: COMPLETED
Start: 2021-10-01 | End: 2021-10-01

## 2021-10-01 RX ORDER — CYANOCOBALAMIN 1000 UG/ML
1000 INJECTION INTRAMUSCULAR; SUBCUTANEOUS ONCE
Status: CANCELLED
Start: 2021-10-29 | End: 2021-10-29

## 2021-10-01 RX ORDER — CYANOCOBALAMIN 1000 UG/ML
1000 INJECTION INTRAMUSCULAR; SUBCUTANEOUS ONCE
Status: COMPLETED | OUTPATIENT
Start: 2021-10-01 | End: 2021-10-01

## 2021-10-01 RX ADMIN — CYANOCOBALAMIN 1000 MCG: 1000 INJECTION INTRAMUSCULAR; SUBCUTANEOUS at 15:57:00

## 2021-10-01 NOTE — PROGRESS NOTES
Monthly [id number] and venofer 200mg given today for iron deficiency anemia. Has a history of chron's disease. B12 injection to left deltoid, no bleeding noted, injection site left open to air. Shujah appeared to tolerate.     PIV established with gage

## 2021-10-29 ENCOUNTER — OFFICE VISIT (OUTPATIENT)
Dept: HEMATOLOGY/ONCOLOGY | Facility: HOSPITAL | Age: 36
End: 2021-10-29
Attending: INTERNAL MEDICINE
Payer: MEDICAID

## 2021-10-29 VITALS
TEMPERATURE: 99 F | SYSTOLIC BLOOD PRESSURE: 128 MMHG | OXYGEN SATURATION: 98 % | HEART RATE: 107 BPM | DIASTOLIC BLOOD PRESSURE: 73 MMHG | RESPIRATION RATE: 16 BRPM

## 2021-10-29 DIAGNOSIS — D50.0 IRON DEFICIENCY ANEMIA DUE TO CHRONIC BLOOD LOSS: Primary | ICD-10-CM

## 2021-10-29 DIAGNOSIS — D50.8 IRON DEFICIENCY ANEMIA SECONDARY TO INADEQUATE DIETARY IRON INTAKE: ICD-10-CM

## 2021-10-29 DIAGNOSIS — K90.89 OTHER SPECIFIED INTESTINAL MALABSORPTION: ICD-10-CM

## 2021-10-29 PROCEDURE — 96372 THER/PROPH/DIAG INJ SC/IM: CPT

## 2021-10-29 PROCEDURE — 96374 THER/PROPH/DIAG INJ IV PUSH: CPT

## 2021-10-29 RX ORDER — CYANOCOBALAMIN 1000 UG/ML
1000 INJECTION INTRAMUSCULAR; SUBCUTANEOUS ONCE
Status: COMPLETED | OUTPATIENT
Start: 2021-10-29 | End: 2021-10-29

## 2021-10-29 RX ORDER — CYANOCOBALAMIN 1000 UG/ML
INJECTION INTRAMUSCULAR; SUBCUTANEOUS
Status: COMPLETED
Start: 2021-10-29 | End: 2021-10-29

## 2021-10-29 RX ORDER — CYANOCOBALAMIN 1000 UG/ML
1000 INJECTION INTRAMUSCULAR; SUBCUTANEOUS ONCE
Status: CANCELLED
Start: 2021-11-26 | End: 2021-11-26

## 2021-10-29 RX ADMIN — CYANOCOBALAMIN 1000 MCG: 1000 INJECTION INTRAMUSCULAR; SUBCUTANEOUS at 16:28:00

## 2021-10-29 NOTE — PROGRESS NOTES
Pt to infusion area for his monthly vitamin B12 inject and venofer infusion. B12 injection to left deltoid, 2x2 and paper tape to site.      PIV established with brisk blood return noted to left ac. 200mg venofer iv push slowly over 5 minutes, with + bl

## 2021-11-04 ENCOUNTER — NURSE ONLY (OUTPATIENT)
Dept: HEMATOLOGY/ONCOLOGY | Facility: HOSPITAL | Age: 36
End: 2021-11-04
Attending: INTERNAL MEDICINE
Payer: MEDICAID

## 2021-11-04 VITALS
HEART RATE: 91 BPM | SYSTOLIC BLOOD PRESSURE: 111 MMHG | WEIGHT: 157 LBS | HEIGHT: 67 IN | OXYGEN SATURATION: 100 % | BODY MASS INDEX: 24.64 KG/M2 | RESPIRATION RATE: 16 BRPM | TEMPERATURE: 98 F | DIASTOLIC BLOOD PRESSURE: 73 MMHG

## 2021-11-04 DIAGNOSIS — D50.0 IRON DEFICIENCY ANEMIA DUE TO CHRONIC BLOOD LOSS: ICD-10-CM

## 2021-11-04 DIAGNOSIS — D50.0 IRON DEFICIENCY ANEMIA DUE TO CHRONIC BLOOD LOSS: Primary | ICD-10-CM

## 2021-11-04 DIAGNOSIS — E53.8 B12 DEFICIENCY: ICD-10-CM

## 2021-11-04 DIAGNOSIS — E55.9 VITAMIN D DEFICIENCY: ICD-10-CM

## 2021-11-04 DIAGNOSIS — Z51.81 MEDICATION MONITORING ENCOUNTER: ICD-10-CM

## 2021-11-04 DIAGNOSIS — K90.9 IRON MALABSORPTION: ICD-10-CM

## 2021-11-04 PROCEDURE — 36415 COLL VENOUS BLD VENIPUNCTURE: CPT

## 2021-11-04 PROCEDURE — 83540 ASSAY OF IRON: CPT

## 2021-11-04 PROCEDURE — 84466 ASSAY OF TRANSFERRIN: CPT

## 2021-11-04 PROCEDURE — 85025 COMPLETE CBC W/AUTO DIFF WBC: CPT

## 2021-11-04 PROCEDURE — 82306 VITAMIN D 25 HYDROXY: CPT

## 2021-11-04 PROCEDURE — 82728 ASSAY OF FERRITIN: CPT

## 2021-11-04 PROCEDURE — 99214 OFFICE O/P EST MOD 30 MIN: CPT | Performed by: INTERNAL MEDICINE

## 2021-11-04 RX ORDER — CHOLECALCIFEROL (VITAMIN D3) 50 MCG
TABLET ORAL
Qty: 90 TABLET | Refills: 3 | Status: SHIPPED | OUTPATIENT
Start: 2021-11-04

## 2021-11-04 NOTE — PROGRESS NOTES
FLOR Guzmánkamaljit Burnham is a 28year old male who is here today for follow-up of of Iron deficiency anemia due to chronic blood loss  (primary encounter diagnosis)  B12 deficiency  Iron malabsorption  Medication monitoring encounter       Iron therapy: Oral Tab Take 10 mg by mouth 2 (two) times daily. • JANUVIA 50 MG Oral Tab Take 50 mg by mouth daily.        Allergies:     Infed [Iron Dextran]    HIVES  Ciprofloxacin               Comment:Other reaction(s): burning urination    Past Medical History: diagnosis)  B12 deficiency  Iron malabsorption  Medication monitoring encounter    Patient has anemia due to chronic iron deficiency secondary to poor absorption as a sequela of Chron's disease and its management.   He has had parenteral iron replacement in CBC W/ DIFFERENTIAL    Collection Time: 11/04/21  2:02 PM   Result Value Ref Range    WBC 7.1 4.0 - 11.0 x10(3) uL    RBC 5.30 4.30 - 5.70 x10(6)uL    HGB 13.6 13.0 - 17.5 g/dL    HCT 42.2 39.0 - 53.0 %    MCV 79.6 (L) 80.0 - 100.0 fL    MCH 25.7 (L) 26.

## 2021-11-19 ENCOUNTER — OFFICE VISIT (OUTPATIENT)
Dept: OTOLARYNGOLOGY | Facility: CLINIC | Age: 36
End: 2021-11-19
Payer: MEDICAID

## 2021-11-19 VITALS — HEIGHT: 70 IN | WEIGHT: 158 LBS | TEMPERATURE: 97 F | BODY MASS INDEX: 22.62 KG/M2

## 2021-11-19 DIAGNOSIS — M26.4 MALOCCLUSION: ICD-10-CM

## 2021-11-19 DIAGNOSIS — M26.609 TMJ (TEMPOROMANDIBULAR JOINT SYNDROME): Primary | ICD-10-CM

## 2021-11-19 PROCEDURE — 99243 OFF/OP CNSLTJ NEW/EST LOW 30: CPT | Performed by: OTOLARYNGOLOGY

## 2021-11-19 PROCEDURE — 3008F BODY MASS INDEX DOCD: CPT | Performed by: OTOLARYNGOLOGY

## 2021-11-19 NOTE — PROGRESS NOTES
Edilson Gamez is a 28year old male. Patient presents with:   Other: jaw pain specially when pt open his mouth      HISTORY OF PRESENT ILLNESS  11/19/2021  Patient is referred for  3-day history of inability to open his mouth well pain in the left jaw a wheezing. Cardio Negative Chest pain, irregular heartbeat   GI Negative Abdominal pain and diarrhea. Endocrine Negative Cold intolerance and heat intolerance. Neuro Negative Tremors.    Psych Negative Behavioral issues   Integumentary Negative Onur Vivaser ergocalciferol 1.25 MG (92549 UT) Oral Cap, Take 1 capsule (50,000 Units total) by mouth once a week. (Patient not taking: Reported on 11/19/2021), Disp: 4 capsule, Rfl: 3  •  folic acid 1 MG Oral Tab, Take 1 tablet (1 mg total) by mouth daily.  (Patient no

## 2021-11-26 ENCOUNTER — APPOINTMENT (OUTPATIENT)
Dept: HEMATOLOGY/ONCOLOGY | Facility: HOSPITAL | Age: 36
End: 2021-11-26
Attending: INTERNAL MEDICINE
Payer: MEDICAID

## 2021-12-03 ENCOUNTER — OFFICE VISIT (OUTPATIENT)
Dept: HEMATOLOGY/ONCOLOGY | Facility: HOSPITAL | Age: 36
End: 2021-12-03
Attending: INTERNAL MEDICINE
Payer: MEDICAID

## 2021-12-03 VITALS
HEART RATE: 93 BPM | OXYGEN SATURATION: 99 % | TEMPERATURE: 98 F | SYSTOLIC BLOOD PRESSURE: 126 MMHG | RESPIRATION RATE: 16 BRPM | DIASTOLIC BLOOD PRESSURE: 73 MMHG

## 2021-12-03 DIAGNOSIS — D50.8 IRON DEFICIENCY ANEMIA SECONDARY TO INADEQUATE DIETARY IRON INTAKE: ICD-10-CM

## 2021-12-03 DIAGNOSIS — K90.89 OTHER SPECIFIED INTESTINAL MALABSORPTION: ICD-10-CM

## 2021-12-03 DIAGNOSIS — D50.0 IRON DEFICIENCY ANEMIA DUE TO CHRONIC BLOOD LOSS: Primary | ICD-10-CM

## 2021-12-03 PROCEDURE — 96372 THER/PROPH/DIAG INJ SC/IM: CPT

## 2021-12-03 PROCEDURE — 96374 THER/PROPH/DIAG INJ IV PUSH: CPT

## 2021-12-03 RX ORDER — CYANOCOBALAMIN 1000 UG/ML
INJECTION INTRAMUSCULAR; SUBCUTANEOUS
Status: COMPLETED
Start: 2021-12-03 | End: 2021-12-03

## 2021-12-03 RX ORDER — CYANOCOBALAMIN 1000 UG/ML
1000 INJECTION INTRAMUSCULAR; SUBCUTANEOUS ONCE
Status: CANCELLED
Start: 2021-12-24 | End: 2021-12-24

## 2021-12-03 RX ORDER — CYANOCOBALAMIN 1000 UG/ML
1000 INJECTION INTRAMUSCULAR; SUBCUTANEOUS ONCE
Status: COMPLETED | OUTPATIENT
Start: 2021-12-03 | End: 2021-12-03

## 2021-12-03 RX ADMIN — CYANOCOBALAMIN 1000 MCG: 1000 INJECTION INTRAMUSCULAR; SUBCUTANEOUS at 16:38:00

## 2021-12-03 NOTE — PROGRESS NOTES
Pt to infusion area for his monthly vitamin B12 inject and venofer infusion. B12 injection to left deltoid, 2x2 and paper tape to site. PIV established with brisk blood return noted to left forearm.  200mg venofer iv push slowly over 5 minutes, with

## 2021-12-10 DIAGNOSIS — E55.9 VITAMIN D DEFICIENCY: ICD-10-CM

## 2021-12-10 RX ORDER — ERGOCALCIFEROL 1.25 MG/1
CAPSULE ORAL
Qty: 4 CAPSULE | Refills: 3 | OUTPATIENT
Start: 2021-12-10

## 2021-12-30 ENCOUNTER — OFFICE VISIT (OUTPATIENT)
Dept: HEMATOLOGY/ONCOLOGY | Facility: HOSPITAL | Age: 36
End: 2021-12-30
Attending: INTERNAL MEDICINE
Payer: MEDICAID

## 2021-12-30 VITALS
OXYGEN SATURATION: 99 % | SYSTOLIC BLOOD PRESSURE: 118 MMHG | TEMPERATURE: 99 F | RESPIRATION RATE: 16 BRPM | HEART RATE: 106 BPM | DIASTOLIC BLOOD PRESSURE: 72 MMHG

## 2021-12-30 DIAGNOSIS — D50.0 IRON DEFICIENCY ANEMIA DUE TO CHRONIC BLOOD LOSS: Primary | ICD-10-CM

## 2021-12-30 DIAGNOSIS — K90.89 OTHER SPECIFIED INTESTINAL MALABSORPTION: ICD-10-CM

## 2021-12-30 DIAGNOSIS — D50.8 IRON DEFICIENCY ANEMIA SECONDARY TO INADEQUATE DIETARY IRON INTAKE: ICD-10-CM

## 2021-12-30 PROCEDURE — 96374 THER/PROPH/DIAG INJ IV PUSH: CPT

## 2021-12-30 PROCEDURE — 96372 THER/PROPH/DIAG INJ SC/IM: CPT

## 2021-12-30 RX ORDER — CYANOCOBALAMIN 1000 UG/ML
1000 INJECTION INTRAMUSCULAR; SUBCUTANEOUS ONCE
Start: 2022-01-27 | End: 2022-01-27

## 2021-12-30 RX ORDER — CYANOCOBALAMIN 1000 UG/ML
INJECTION INTRAMUSCULAR; SUBCUTANEOUS
Status: COMPLETED
Start: 2021-12-30 | End: 2021-12-30

## 2021-12-30 RX ORDER — CYANOCOBALAMIN 1000 UG/ML
1000 INJECTION INTRAMUSCULAR; SUBCUTANEOUS ONCE
Status: COMPLETED | OUTPATIENT
Start: 2021-12-30 | End: 2021-12-30

## 2021-12-30 RX ADMIN — CYANOCOBALAMIN 1000 MCG: 1000 INJECTION INTRAMUSCULAR; SUBCUTANEOUS at 16:27:00

## 2021-12-30 NOTE — PROGRESS NOTES
Pt to infusion area for his monthly vitamin B12 inject and venofer infusion. States he has been feeling good. B12 injection to left deltoid, 2x2 and paper tape to site. PIV established with brisk blood return noted to left forearm.  200mg venofer

## 2022-02-04 ENCOUNTER — TELEPHONE (OUTPATIENT)
Dept: HEMATOLOGY/ONCOLOGY | Facility: HOSPITAL | Age: 37
End: 2022-02-04

## 2022-02-08 RX ORDER — CYANOCOBALAMIN 1000 UG/ML
1000 INJECTION INTRAMUSCULAR; SUBCUTANEOUS ONCE
Status: CANCELLED
Start: 2022-02-08 | End: 2022-02-08

## 2022-02-15 ENCOUNTER — OFFICE VISIT (OUTPATIENT)
Dept: HEMATOLOGY/ONCOLOGY | Facility: HOSPITAL | Age: 37
End: 2022-02-15
Attending: INTERNAL MEDICINE
Payer: MEDICAID

## 2022-02-15 VITALS
WEIGHT: 156 LBS | DIASTOLIC BLOOD PRESSURE: 81 MMHG | HEART RATE: 98 BPM | BODY MASS INDEX: 24.48 KG/M2 | TEMPERATURE: 99 F | RESPIRATION RATE: 16 BRPM | SYSTOLIC BLOOD PRESSURE: 122 MMHG | HEIGHT: 67 IN | OXYGEN SATURATION: 100 %

## 2022-02-15 VITALS
OXYGEN SATURATION: 100 % | DIASTOLIC BLOOD PRESSURE: 81 MMHG | RESPIRATION RATE: 16 BRPM | HEART RATE: 98 BPM | SYSTOLIC BLOOD PRESSURE: 122 MMHG | TEMPERATURE: 99 F

## 2022-02-15 DIAGNOSIS — K50.918 EXACERBATION OF CROHN'S DISEASE, OTHER COMPLICATION (HCC): ICD-10-CM

## 2022-02-15 DIAGNOSIS — K90.89 OTHER SPECIFIED INTESTINAL MALABSORPTION: ICD-10-CM

## 2022-02-15 DIAGNOSIS — E53.8 B12 DEFICIENCY: ICD-10-CM

## 2022-02-15 DIAGNOSIS — D50.8 IRON DEFICIENCY ANEMIA SECONDARY TO INADEQUATE DIETARY IRON INTAKE: ICD-10-CM

## 2022-02-15 DIAGNOSIS — D50.0 IRON DEFICIENCY ANEMIA DUE TO CHRONIC BLOOD LOSS: Primary | ICD-10-CM

## 2022-02-15 DIAGNOSIS — E55.9 VITAMIN D DEFICIENCY: ICD-10-CM

## 2022-02-15 LAB
BASOPHILS # BLD AUTO: 0.04 X10(3) UL (ref 0–0.2)
BASOPHILS NFR BLD AUTO: 0.6 %
DEPRECATED HBV CORE AB SER IA-ACNC: 276.9 NG/ML
DEPRECATED RDW RBC AUTO: 37.7 FL (ref 35.1–46.3)
EOSINOPHIL # BLD AUTO: 0.16 X10(3) UL (ref 0–0.7)
EOSINOPHIL NFR BLD AUTO: 2.2 %
ERYTHROCYTE [DISTWIDTH] IN BLOOD BY AUTOMATED COUNT: 13 % (ref 11–15)
HCT VFR BLD AUTO: 46.7 %
HGB BLD-MCNC: 15.3 G/DL
IMM GRANULOCYTES # BLD AUTO: 0.05 X10(3) UL (ref 0–1)
IMM GRANULOCYTES NFR BLD: 0.7 %
IRON SATN MFR SERPL: 12 %
IRON SERPL-MCNC: 37 UG/DL
LYMPHOCYTES # BLD AUTO: 2.2 X10(3) UL (ref 1–4)
LYMPHOCYTES NFR BLD AUTO: 30.6 %
MCH RBC QN AUTO: 26.4 PG (ref 26–34)
MCHC RBC AUTO-ENTMCNC: 32.8 G/DL (ref 31–37)
MCV RBC AUTO: 80.5 FL
MONOCYTES # BLD AUTO: 0.57 X10(3) UL (ref 0.1–1)
MONOCYTES NFR BLD AUTO: 7.9 %
NEUTROPHILS # BLD AUTO: 4.17 X10 (3) UL (ref 1.5–7.7)
NEUTROPHILS # BLD AUTO: 4.17 X10(3) UL (ref 1.5–7.7)
NEUTROPHILS NFR BLD AUTO: 58 %
PLATELET # BLD AUTO: 277 10(3)UL (ref 150–450)
RBC # BLD AUTO: 5.8 X10(6)UL
TIBC SERPL-MCNC: 302 UG/DL (ref 240–450)
TRANSFERRIN SERPL-MCNC: 203 MG/DL (ref 200–360)
VIT D+METAB SERPL-MCNC: 40.5 NG/ML (ref 30–100)
WBC # BLD AUTO: 7.2 X10(3) UL (ref 4–11)

## 2022-02-15 PROCEDURE — 36592 COLLECT BLOOD FROM PICC: CPT

## 2022-02-15 PROCEDURE — 96372 THER/PROPH/DIAG INJ SC/IM: CPT

## 2022-02-15 PROCEDURE — 83540 ASSAY OF IRON: CPT

## 2022-02-15 PROCEDURE — 96374 THER/PROPH/DIAG INJ IV PUSH: CPT

## 2022-02-15 PROCEDURE — 82728 ASSAY OF FERRITIN: CPT

## 2022-02-15 PROCEDURE — 85025 COMPLETE CBC W/AUTO DIFF WBC: CPT

## 2022-02-15 PROCEDURE — 82306 VITAMIN D 25 HYDROXY: CPT

## 2022-02-15 PROCEDURE — 84466 ASSAY OF TRANSFERRIN: CPT

## 2022-02-15 PROCEDURE — 99214 OFFICE O/P EST MOD 30 MIN: CPT | Performed by: INTERNAL MEDICINE

## 2022-02-15 RX ORDER — CYANOCOBALAMIN 1000 UG/ML
1000 INJECTION INTRAMUSCULAR; SUBCUTANEOUS ONCE
Status: COMPLETED | OUTPATIENT
Start: 2022-02-15 | End: 2022-02-15

## 2022-02-15 RX ORDER — CYANOCOBALAMIN 1000 UG/ML
1000 INJECTION INTRAMUSCULAR; SUBCUTANEOUS ONCE
Status: CANCELLED
Start: 2022-02-22 | End: 2022-02-22

## 2022-02-15 RX ORDER — CYANOCOBALAMIN 1000 UG/ML
INJECTION INTRAMUSCULAR; SUBCUTANEOUS
Status: COMPLETED
Start: 2022-02-15 | End: 2022-02-15

## 2022-02-15 RX ADMIN — CYANOCOBALAMIN 1000 MCG: 1000 INJECTION INTRAMUSCULAR; SUBCUTANEOUS at 16:45:00

## 2022-02-15 NOTE — PROGRESS NOTES
Pt to infusion area for his monthly vitamin B12 inject and venofer infusion. States he has been feeling good. B12 injection to left deltoid, bandaid placed over site. PIV established with brisk blood return noted to LAC. Labs drawn as ordered. 200mg venofer iv push slowly over 5 minutes, with + blood return noted throughout. Fiona Woodward appeared to tolerate with no s/s of adverse reaction noted. PIV removed, site covered with 2x2g and coban. No bleeding noted. Discharged to [facility] for MD appointment. Future appointments scheduled. Prefers [facility].

## 2022-02-24 ENCOUNTER — APPOINTMENT (OUTPATIENT)
Dept: HEMATOLOGY/ONCOLOGY | Facility: HOSPITAL | Age: 37
End: 2022-02-24
Attending: INTERNAL MEDICINE
Payer: MEDICAID

## 2022-03-04 ENCOUNTER — OFFICE VISIT (OUTPATIENT)
Dept: HEMATOLOGY/ONCOLOGY | Facility: HOSPITAL | Age: 37
End: 2022-03-04
Attending: INTERNAL MEDICINE
Payer: MEDICAID

## 2022-03-04 VITALS
RESPIRATION RATE: 16 BRPM | SYSTOLIC BLOOD PRESSURE: 113 MMHG | DIASTOLIC BLOOD PRESSURE: 78 MMHG | OXYGEN SATURATION: 98 % | TEMPERATURE: 98 F

## 2022-03-04 DIAGNOSIS — K90.89 OTHER SPECIFIED INTESTINAL MALABSORPTION: ICD-10-CM

## 2022-03-04 DIAGNOSIS — D50.0 IRON DEFICIENCY ANEMIA DUE TO CHRONIC BLOOD LOSS: Primary | ICD-10-CM

## 2022-03-04 DIAGNOSIS — D50.8 IRON DEFICIENCY ANEMIA SECONDARY TO INADEQUATE DIETARY IRON INTAKE: ICD-10-CM

## 2022-03-04 PROCEDURE — 96374 THER/PROPH/DIAG INJ IV PUSH: CPT

## 2022-03-04 PROCEDURE — 96372 THER/PROPH/DIAG INJ SC/IM: CPT

## 2022-03-04 RX ORDER — CYANOCOBALAMIN 1000 UG/ML
1000 INJECTION INTRAMUSCULAR; SUBCUTANEOUS ONCE
Status: COMPLETED | OUTPATIENT
Start: 2022-03-04 | End: 2022-03-04

## 2022-03-04 RX ORDER — CYANOCOBALAMIN 1000 UG/ML
INJECTION INTRAMUSCULAR; SUBCUTANEOUS
Status: COMPLETED
Start: 2022-03-04 | End: 2022-03-04

## 2022-03-04 RX ORDER — CYANOCOBALAMIN 1000 UG/ML
1000 INJECTION INTRAMUSCULAR; SUBCUTANEOUS ONCE
Status: CANCELLED
Start: 2022-03-08 | End: 2022-03-08

## 2022-03-04 RX ADMIN — CYANOCOBALAMIN 1000 MCG: 1000 INJECTION INTRAMUSCULAR; SUBCUTANEOUS at 16:17:00

## 2022-03-04 NOTE — PROGRESS NOTES
Patient arrives for monthly venofer and B12 injection. Reports he is well. B12 given in left deltoid, patient tolerated well. PIV started in left AC, positive blood return noted. Venofer given slowly over 4 minutes, patient tolerated well. PIV removed, site covered with 2x2 and coban. Discharged ambulating independently with future appointments scheduled.

## 2022-03-08 ENCOUNTER — APPOINTMENT (OUTPATIENT)
Dept: GENERAL RADIOLOGY | Facility: HOSPITAL | Age: 37
DRG: 387 | End: 2022-03-08
Attending: NURSE PRACTITIONER
Payer: MEDICAID

## 2022-03-08 ENCOUNTER — HOSPITAL ENCOUNTER (INPATIENT)
Facility: HOSPITAL | Age: 37
LOS: 2 days | Discharge: HOME | DRG: 387 | End: 2022-03-10
Attending: HOSPITALIST | Admitting: HOSPITALIST
Payer: MEDICAID

## 2022-03-08 ENCOUNTER — APPOINTMENT (OUTPATIENT)
Dept: CT IMAGING | Facility: HOSPITAL | Age: 37
DRG: 387 | End: 2022-03-08
Attending: NURSE PRACTITIONER
Payer: MEDICAID

## 2022-03-08 DIAGNOSIS — K56.609 SMALL BOWEL OBSTRUCTION (HCC): Primary | ICD-10-CM

## 2022-03-08 LAB
ANION GAP SERPL CALC-SCNC: 3 MMOL/L (ref 0–18)
BASOPHILS # BLD AUTO: 0.04 X10(3) UL (ref 0–0.2)
BASOPHILS NFR BLD AUTO: 0.5 %
BILIRUB UR QL: NEGATIVE
BUN BLD-MCNC: 6 MG/DL (ref 7–18)
BUN/CREAT SERPL: 5.6 (ref 10–20)
CALCIUM BLD-MCNC: 9.2 MG/DL (ref 8.5–10.1)
CHLORIDE SERPL-SCNC: 105 MMOL/L (ref 98–112)
CO2 SERPL-SCNC: 30 MMOL/L (ref 21–32)
COLOR UR: YELLOW
CREAT BLD-MCNC: 1.07 MG/DL
DEPRECATED RDW RBC AUTO: 36.7 FL (ref 35.1–46.3)
EOSINOPHIL # BLD AUTO: 0.03 X10(3) UL (ref 0–0.7)
EOSINOPHIL NFR BLD AUTO: 0.4 %
ERYTHROCYTE [DISTWIDTH] IN BLOOD BY AUTOMATED COUNT: 12.6 % (ref 11–15)
GLUCOSE BLD-MCNC: 167 MG/DL (ref 70–99)
GLUCOSE UR-MCNC: NEGATIVE MG/DL
HCT VFR BLD AUTO: 46.8 %
HGB BLD-MCNC: 14.8 G/DL
IMM GRANULOCYTES # BLD AUTO: 0.03 X10(3) UL (ref 0–1)
IMM GRANULOCYTES NFR BLD: 0.4 %
LEUKOCYTE ESTERASE UR QL STRIP.AUTO: NEGATIVE
LYMPHOCYTES # BLD AUTO: 1.91 X10(3) UL (ref 1–4)
LYMPHOCYTES NFR BLD AUTO: 24.5 %
MCH RBC QN AUTO: 25.7 PG (ref 26–34)
MCHC RBC AUTO-ENTMCNC: 31.6 G/DL (ref 31–37)
MCV RBC AUTO: 81.3 FL
MONOCYTES # BLD AUTO: 0.56 X10(3) UL (ref 0.1–1)
MONOCYTES NFR BLD AUTO: 7.2 %
NEUTROPHILS # BLD AUTO: 5.24 X10 (3) UL (ref 1.5–7.7)
NEUTROPHILS # BLD AUTO: 5.24 X10(3) UL (ref 1.5–7.7)
NEUTROPHILS NFR BLD AUTO: 67 %
NITRITE UR QL STRIP.AUTO: NEGATIVE
OSMOLALITY SERPL CALC.SUM OF ELEC: 287 MOSM/KG (ref 275–295)
PH UR: 5 [PH] (ref 5–8)
PLATELET # BLD AUTO: 291 10(3)UL (ref 150–450)
POTASSIUM SERPL-SCNC: 3.9 MMOL/L (ref 3.5–5.1)
PROT UR-MCNC: 30 MG/DL
RBC # BLD AUTO: 5.76 X10(6)UL
SARS-COV-2 RNA RESP QL NAA+PROBE: NOT DETECTED
SODIUM SERPL-SCNC: 138 MMOL/L (ref 136–145)
SP GR UR STRIP: 1.02 (ref 1–1.03)
UROBILINOGEN UR STRIP-ACNC: <2
VIT C UR-MCNC: NEGATIVE MG/DL
WBC # BLD AUTO: 7.8 X10(3) UL (ref 4–11)

## 2022-03-08 PROCEDURE — 99223 1ST HOSP IP/OBS HIGH 75: CPT | Performed by: HOSPITALIST

## 2022-03-08 PROCEDURE — 3052F HG A1C>EQUAL 8.0%<EQUAL 9.0%: CPT | Performed by: INTERNAL MEDICINE

## 2022-03-08 PROCEDURE — 74177 CT ABD & PELVIS W/CONTRAST: CPT | Performed by: NURSE PRACTITIONER

## 2022-03-08 PROCEDURE — 0D9670Z DRAINAGE OF STOMACH WITH DRAINAGE DEVICE, VIA NATURAL OR ARTIFICIAL OPENING: ICD-10-PCS | Performed by: NURSE PRACTITIONER

## 2022-03-08 PROCEDURE — 71045 X-RAY EXAM CHEST 1 VIEW: CPT | Performed by: NURSE PRACTITIONER

## 2022-03-08 RX ORDER — MORPHINE SULFATE 4 MG/ML
4 INJECTION, SOLUTION INTRAMUSCULAR; INTRAVENOUS ONCE
Status: COMPLETED | OUTPATIENT
Start: 2022-03-08 | End: 2022-03-08

## 2022-03-08 RX ORDER — MORPHINE SULFATE 4 MG/ML
4 INJECTION, SOLUTION INTRAMUSCULAR; INTRAVENOUS EVERY 2 HOUR PRN
Status: DISCONTINUED | OUTPATIENT
Start: 2022-03-08 | End: 2022-03-10

## 2022-03-08 RX ORDER — HEPARIN SODIUM 5000 [USP'U]/ML
5000 INJECTION, SOLUTION INTRAVENOUS; SUBCUTANEOUS EVERY 12 HOURS SCHEDULED
Status: DISCONTINUED | OUTPATIENT
Start: 2022-03-09 | End: 2022-03-10

## 2022-03-08 RX ORDER — MORPHINE SULFATE 2 MG/ML
2 INJECTION, SOLUTION INTRAMUSCULAR; INTRAVENOUS EVERY 2 HOUR PRN
Status: DISCONTINUED | OUTPATIENT
Start: 2022-03-08 | End: 2022-03-10

## 2022-03-08 RX ORDER — METOCLOPRAMIDE 10 MG/1
10 TABLET ORAL EVERY 6 HOURS PRN
Status: DISCONTINUED | OUTPATIENT
Start: 2022-03-08 | End: 2022-03-10

## 2022-03-08 RX ORDER — ONDANSETRON 2 MG/ML
4 INJECTION INTRAMUSCULAR; INTRAVENOUS ONCE
Status: COMPLETED | OUTPATIENT
Start: 2022-03-08 | End: 2022-03-08

## 2022-03-08 RX ORDER — SODIUM CHLORIDE, SODIUM LACTATE, POTASSIUM CHLORIDE, CALCIUM CHLORIDE 600; 310; 30; 20 MG/100ML; MG/100ML; MG/100ML; MG/100ML
INJECTION, SOLUTION INTRAVENOUS CONTINUOUS
Status: DISCONTINUED | OUTPATIENT
Start: 2022-03-08 | End: 2022-03-10

## 2022-03-08 RX ORDER — ONDANSETRON 2 MG/ML
4 INJECTION INTRAMUSCULAR; INTRAVENOUS EVERY 6 HOURS PRN
Status: DISCONTINUED | OUTPATIENT
Start: 2022-03-08 | End: 2022-03-10

## 2022-03-08 RX ORDER — ACETAMINOPHEN 325 MG/1
650 TABLET ORAL EVERY 6 HOURS PRN
Status: DISCONTINUED | OUTPATIENT
Start: 2022-03-08 | End: 2022-03-10

## 2022-03-08 RX ORDER — METOCLOPRAMIDE HYDROCHLORIDE 5 MG/ML
10 INJECTION INTRAMUSCULAR; INTRAVENOUS EVERY 6 HOURS PRN
Status: DISCONTINUED | OUTPATIENT
Start: 2022-03-08 | End: 2022-03-10

## 2022-03-08 RX ORDER — MORPHINE SULFATE 2 MG/ML
1 INJECTION, SOLUTION INTRAMUSCULAR; INTRAVENOUS EVERY 2 HOUR PRN
Status: DISCONTINUED | OUTPATIENT
Start: 2022-03-08 | End: 2022-03-10

## 2022-03-08 RX ORDER — SODIUM CHLORIDE 9 MG/ML
INJECTION, SOLUTION INTRAVENOUS CONTINUOUS
Status: DISCONTINUED | OUTPATIENT
Start: 2022-03-08 | End: 2022-03-08

## 2022-03-08 RX ORDER — METHYLPREDNISOLONE SODIUM SUCCINATE 125 MG/2ML
60 INJECTION, POWDER, LYOPHILIZED, FOR SOLUTION INTRAMUSCULAR; INTRAVENOUS ONCE
Status: COMPLETED | OUTPATIENT
Start: 2022-03-08 | End: 2022-03-08

## 2022-03-08 RX ORDER — USTEKINUMAB 90 MG/ML
INJECTION, SOLUTION SUBCUTANEOUS
COMMUNITY
Start: 2022-01-11

## 2022-03-08 NOTE — ED INITIAL ASSESSMENT (HPI)
Presents for left abdominal pain that started at 7 am. +nausea, no vomiting or diarrhea. Unable to tolerate any PO intake. Took pepto bismol and antacid, no relief. Denies fevers, sick contacts.  Hx of Crohns, last flare up about 2 years ago

## 2022-03-09 ENCOUNTER — TELEPHONE (OUTPATIENT)
Dept: ENDOCRINOLOGY CLINIC | Facility: CLINIC | Age: 37
End: 2022-03-09

## 2022-03-09 ENCOUNTER — APPOINTMENT (OUTPATIENT)
Dept: GENERAL RADIOLOGY | Facility: HOSPITAL | Age: 37
DRG: 387 | End: 2022-03-09
Attending: SURGERY
Payer: MEDICAID

## 2022-03-09 LAB
ANION GAP SERPL CALC-SCNC: 5 MMOL/L (ref 0–18)
BUN BLD-MCNC: 8 MG/DL (ref 7–18)
BUN/CREAT SERPL: 8 (ref 10–20)
C DIFF TOX B STL QL: NEGATIVE
CALCIUM BLD-MCNC: 8.7 MG/DL (ref 8.5–10.1)
CHLORIDE SERPL-SCNC: 106 MMOL/L (ref 98–112)
CO2 SERPL-SCNC: 26 MMOL/L (ref 21–32)
CREAT BLD-MCNC: 1 MG/DL
CRP SERPL-MCNC: 2.73 MG/DL (ref ?–0.3)
DEPRECATED RDW RBC AUTO: 37.5 FL (ref 35.1–46.3)
ERYTHROCYTE [DISTWIDTH] IN BLOOD BY AUTOMATED COUNT: 12.6 % (ref 11–15)
EST. AVERAGE GLUCOSE BLD GHB EST-MCNC: 189 MG/DL (ref 68–126)
GLUCOSE BLDC GLUCOMTR-MCNC: 140 MG/DL (ref 70–99)
GLUCOSE BLDC GLUCOMTR-MCNC: 172 MG/DL (ref 70–99)
GLUCOSE BLDC GLUCOMTR-MCNC: 198 MG/DL (ref 70–99)
HBA1C MFR BLD: 8.2 % (ref ?–5.7)
HCT VFR BLD AUTO: 43.5 %
HGB BLD-MCNC: 13.6 G/DL
MCH RBC QN AUTO: 25.7 PG (ref 26–34)
MCHC RBC AUTO-ENTMCNC: 31.3 G/DL (ref 31–37)
MCV RBC AUTO: 82.1 FL
PLATELET # BLD AUTO: 262 10(3)UL (ref 150–450)
POTASSIUM SERPL-SCNC: 4.2 MMOL/L (ref 3.5–5.1)
RBC # BLD AUTO: 5.3 X10(6)UL
SODIUM SERPL-SCNC: 137 MMOL/L (ref 136–145)
WBC # BLD AUTO: 6.2 X10(3) UL (ref 4–11)

## 2022-03-09 PROCEDURE — 99233 SBSQ HOSP IP/OBS HIGH 50: CPT | Performed by: HOSPITALIST

## 2022-03-09 PROCEDURE — 99223 1ST HOSP IP/OBS HIGH 75: CPT | Performed by: INTERNAL MEDICINE

## 2022-03-09 PROCEDURE — 74019 RADEX ABDOMEN 2 VIEWS: CPT | Performed by: SURGERY

## 2022-03-09 PROCEDURE — 71045 X-RAY EXAM CHEST 1 VIEW: CPT | Performed by: SURGERY

## 2022-03-09 RX ORDER — METHYLPREDNISOLONE SODIUM SUCCINATE 40 MG/ML
20 INJECTION, POWDER, LYOPHILIZED, FOR SOLUTION INTRAMUSCULAR; INTRAVENOUS EVERY 8 HOURS
Status: DISCONTINUED | OUTPATIENT
Start: 2022-03-09 | End: 2022-03-10

## 2022-03-09 RX ORDER — DEXTROSE MONOHYDRATE 25 G/50ML
50 INJECTION, SOLUTION INTRAVENOUS
Status: DISCONTINUED | OUTPATIENT
Start: 2022-03-09 | End: 2022-03-10

## 2022-03-09 RX ORDER — NICOTINE POLACRILEX 4 MG
30 LOZENGE BUCCAL
Status: DISCONTINUED | OUTPATIENT
Start: 2022-03-09 | End: 2022-03-10

## 2022-03-09 RX ORDER — NICOTINE POLACRILEX 4 MG
15 LOZENGE BUCCAL
Status: DISCONTINUED | OUTPATIENT
Start: 2022-03-09 | End: 2022-03-10

## 2022-03-09 NOTE — PLAN OF CARE
Discussed plan of care for day, including all given medications and their indications. NG tube maintained to left nare and to low intermittent suction. Pain and nausea improved per patient. Small amount liquid stool output to ileostomy then turned to increased formed stool output and gas. Abdomen x-ray still showing small bowel obstruction and malposition of NG tube -- repositioned and verified with chest x-ray. NG tube removed this evening after increased discomfort and unsuccessful reposition after x-ray showing malposition -- plan to closely monitor for increased pain and nausea. Comfort measures encouraged to promote restful environment. Problem: Patient Centered Care  Goal: Patient preferences are identified and integrated in the patient's plan of care  Description: Interventions:  - What would you like us to know as we care for you?  This is my second ileostomy.  - Provide timely, complete, and accurate information to patient/family  - Incorporate patient and family knowledge, values, beliefs, and cultural backgrounds into the planning and delivery of care  - Encourage patient/family to participate in care and decision-making at the level they choose  - Honor patient and family perspectives and choices  Outcome: Progressing     Problem: Patient/Family Goals  Goal: Patient/Family Long Term Goal  Description: Patient's Long Term Goal: Discharge home    Interventions:  - NGT  - Bowel rest/ NPO  - Pain control   - See additional Care Plan goals for specific interventions  Outcome: Progressing  Goal: Patient/Family Short Term Goal  Description: Patient's Short Term Goal: Pain/Nausea relief    Interventions:   - PRN pain medication   - zofran/reglan PRN  - NPO   - See additional Care Plan goals for specific interventions  Outcome: Progressing     Problem: GASTROINTESTINAL - ADULT  Goal: Minimal or absence of nausea and vomiting  Description: INTERVENTIONS:  - Maintain adequate hydration with IV or PO as ordered and tolerated  - Nasogastric tube to low intermittent suction as ordered  - Evaluate effectiveness of ordered antiemetic medications  - Provide nonpharmacologic comfort measures as appropriate  - Advance diet as tolerated, if ordered  - Obtain nutritional consult as needed  - Evaluate fluid balance  Outcome: Progressing  Goal: Maintains or returns to baseline bowel function  Description: INTERVENTIONS:  - Assess bowel function  - Maintain adequate hydration with IV or PO as ordered and tolerated  - Evaluate effectiveness of GI medications  - Encourage mobilization and activity  - Obtain nutritional consult as needed  - Establish a toileting routine/schedule  - Consider collaborating with pharmacy to review patient's medication profile  Outcome: Progressing     Problem: METABOLIC/FLUID AND ELECTROLYTES - ADULT  Goal: Glucose maintained within prescribed range  Description: INTERVENTIONS:  - Monitor Blood Glucose as ordered  - Assess for signs and symptoms of hyperglycemia and hypoglycemia  - Administer ordered medications to maintain glucose within target range  - Assess barriers to adequate nutritional intake and initiate nutrition consult as needed  - Instruct patient on self management of diabetes  Outcome: Progressing     Problem: PAIN - ADULT  Goal: Verbalizes/displays adequate comfort level or patient's stated pain goal  Description: INTERVENTIONS:  - Encourage pt to monitor pain and request assistance  - Assess pain using appropriate pain scale  - Administer analgesics based on type and severity of pain and evaluate response  - Implement non-pharmacological measures as appropriate and evaluate response  - Consider cultural and social influences on pain and pain management  - Manage/alleviate anxiety  - Utilize distraction and/or relaxation techniques  - Monitor for opioid side effects  - Notify MD/LIP if interventions unsuccessful or patient reports new pain  - Anticipate increased pain with activity and pre-medicate as appropriate  Outcome: Progressing     Problem: DISCHARGE PLANNING  Goal: Discharge to home or other facility with appropriate resources  Description: INTERVENTIONS:  - Identify barriers to discharge w/pt and caregiver  - Include patient/family/discharge partner in discharge planning  - Arrange for needed discharge resources and transportation as appropriate  - Identify discharge learning needs (meds, wound care, etc)  - Arrange for interpreters to assist at discharge as needed  - Consider post-discharge preferences of patient/family/discharge partner  - Complete POLST form as appropriate  - Assess patient's ability to be responsible for managing their own health  - Refer to Case Management Department for coordinating discharge planning if the patient needs post-hospital services based on physician/LIP order or complex needs related to functional status, cognitive ability or social support system  Outcome: Progressing     All efforts maintained to promote infection prevention during my assessment and care for this patient. Stethoscope cleansed and hand hygiene strictly maintained.

## 2022-03-09 NOTE — ED QUICK NOTES
Orders for admission, patient is aware of plan and ready to go upstairs. Any questions, please call ED RN Richard andino at extension [phone].      Patient Covid vaccination status: Fully vaccinated     COVID Test Ordered in ED: Rapid SARS-CoV-2 by PCR    COVID Suspicion at Admission: N/A    Running Infusions:  None    Mental Status/LOC at time of transport: A&Ox4    Other pertinent information: NGT to left nare  CIWA score: N/A   NIH score:  N/A

## 2022-03-09 NOTE — PLAN OF CARE
Problem: Patient Centered Care  Goal: Patient preferences are identified and integrated in the patient's plan of care  Description: Interventions:  - What would you like us to know as we care for you?  This is my second ileostomy.  - Provide timely, complete, and accurate information to patient/family  - Incorporate patient and family knowledge, values, beliefs, and cultural backgrounds into the planning and delivery of care  - Encourage patient/family to participate in care and decision-making at the level they choose  - Honor patient and family perspectives and choices  Outcome: Progressing     Problem: Patient/Family Goals  Goal: Patient/Family Long Term Goal  Description: Patient's Long Term Goal: Discharge home    Interventions:  - NGT  - Bowel rest/ NPO  - Pain control   - See additional Care Plan goals for specific interventions  Outcome: Progressing  Goal: Patient/Family Short Term Goal  Description: Patient's Short Term Goal: Pain/Nausea relief    Interventions:   - PRN pain medication   - zofran/reglan PRN  - NPO   - See additional Care Plan goals for specific interventions  Outcome: Progressing     Problem: GASTROINTESTINAL - ADULT  Goal: Minimal or absence of nausea and vomiting  Description: INTERVENTIONS:  - Maintain adequate hydration with IV or PO as ordered and tolerated  - Nasogastric tube to low intermittent suction as ordered  - Evaluate effectiveness of ordered antiemetic medications  - Provide nonpharmacologic comfort measures as appropriate  - Advance diet as tolerated, if ordered  - Obtain nutritional consult as needed  - Evaluate fluid balance  Outcome: Progressing     Problem: METABOLIC/FLUID AND ELECTROLYTES - ADULT  Goal: Glucose maintained within prescribed range  Description: INTERVENTIONS:  - Monitor Blood Glucose as ordered  - Assess for signs and symptoms of hyperglycemia and hypoglycemia  - Administer ordered medications to maintain glucose within target range  - Assess barriers to adequate nutritional intake and initiate nutrition consult as needed  - Instruct patient on self management of diabetes  Outcome: Progressing     Problem: PAIN - ADULT  Goal: Verbalizes/displays adequate comfort level or patient's stated pain goal  Description: INTERVENTIONS:  - Encourage pt to monitor pain and request assistance  - Assess pain using appropriate pain scale  - Administer analgesics based on type and severity of pain and evaluate response  - Implement non-pharmacological measures as appropriate and evaluate response  - Consider cultural and social influences on pain and pain management  - Manage/alleviate anxiety  - Utilize distraction and/or relaxation techniques  - Monitor for opioid side effects  - Notify MD/LIP if interventions unsuccessful or patient reports new pain  - Anticipate increased pain with activity and pre-medicate as appropriate  Outcome: Progressing     Problem: DISCHARGE PLANNING  Goal: Discharge to home or other facility with appropriate resources  Description: INTERVENTIONS:  - Identify barriers to discharge w/pt and caregiver  - Include patient/family/discharge partner in discharge planning  - Arrange for needed discharge resources and transportation as appropriate  - Identify discharge learning needs (meds, wound care, etc)  - Arrange for interpreters to assist at discharge as needed  - Consider post-discharge preferences of patient/family/discharge partner  - Complete POLST form as appropriate  - Assess patient's ability to be responsible for managing their own health  - Refer to Case Management Department for coordinating discharge planning if the patient needs post-hospital services based on physician/LIP order or complex needs related to functional status, cognitive ability or social support system  Outcome: Progressing     Received pt from ED. A/O x4. Ambulatory. Family at bedside. CT results reviewed with PT. VSS on RA. NGT to LIS in L nare. Ileostomy in place.  Bag emptied with pt. NPO maintained. Accuchecks performed q6, WNL. IVF infusing. Pain managed with PRN morphine x1. PRN reglan x1 for c/o nausea. Call light within reach.

## 2022-03-10 VITALS
SYSTOLIC BLOOD PRESSURE: 116 MMHG | TEMPERATURE: 98 F | HEART RATE: 71 BPM | DIASTOLIC BLOOD PRESSURE: 77 MMHG | HEIGHT: 67 IN | BODY MASS INDEX: 23.54 KG/M2 | WEIGHT: 150 LBS | RESPIRATION RATE: 16 BRPM | OXYGEN SATURATION: 98 %

## 2022-03-10 LAB
GLUCOSE BLDC GLUCOMTR-MCNC: 161 MG/DL (ref 70–99)
GLUCOSE BLDC GLUCOMTR-MCNC: 177 MG/DL (ref 70–99)
GLUCOSE BLDC GLUCOMTR-MCNC: 201 MG/DL (ref 70–99)

## 2022-03-10 PROCEDURE — 99239 HOSP IP/OBS DSCHRG MGMT >30: CPT | Performed by: HOSPITALIST

## 2022-03-10 PROCEDURE — 99232 SBSQ HOSP IP/OBS MODERATE 35: CPT | Performed by: INTERNAL MEDICINE

## 2022-03-10 RX ORDER — PREDNISONE 10 MG/1
TABLET ORAL
Qty: 70 TABLET | Refills: 0 | Status: SHIPPED | OUTPATIENT
Start: 2022-03-10

## 2022-03-10 NOTE — PLAN OF CARE
Problem: Patient Centered Care  Goal: Patient preferences are identified and integrated in the patient's plan of care  Description: Interventions:  - What would you like us to know as we care for you?  This is my second ileostomy.  - Provide timely, complete, and accurate information to patient/family  - Incorporate patient and family knowledge, values, beliefs, and cultural backgrounds into the planning and delivery of care  - Encourage patient/family to participate in care and decision-making at the level they choose  - Honor patient and family perspectives and choices  Outcome: Progressing     Problem: Patient/Family Goals  Goal: Patient/Family Long Term Goal  Description: Patient's Long Term Goal: Discharge home    Interventions:  - NGT  - Bowel rest/ NPO  - Pain control   - See additional Care Plan goals for specific interventions  Outcome: Progressing  Goal: Patient/Family Short Term Goal  Description: Patient's Short Term Goal: Pain/Nausea relief    Interventions:   - PRN pain medication   - zofran/reglan PRN  - NPO   - See additional Care Plan goals for specific interventions  Outcome: Progressing     Problem: GASTROINTESTINAL - ADULT  Goal: Minimal or absence of nausea and vomiting  Description: INTERVENTIONS:  - Maintain adequate hydration with IV or PO as ordered and tolerated  - Nasogastric tube to low intermittent suction as ordered  - Evaluate effectiveness of ordered antiemetic medications  - Provide nonpharmacologic comfort measures as appropriate  - Advance diet as tolerated, if ordered  - Obtain nutritional consult as needed  - Evaluate fluid balance  Outcome: Progressing  Goal: Maintains or returns to baseline bowel function  Description: INTERVENTIONS:  - Assess bowel function  - Maintain adequate hydration with IV or PO as ordered and tolerated  - Evaluate effectiveness of GI medications  - Encourage mobilization and activity  - Obtain nutritional consult as needed  - Establish a toileting routine/schedule  - Consider collaborating with pharmacy to review patient's medication profile  Outcome: Progressing     Problem: METABOLIC/FLUID AND ELECTROLYTES - ADULT  Goal: Glucose maintained within prescribed range  Description: INTERVENTIONS:  - Monitor Blood Glucose as ordered  - Assess for signs and symptoms of hyperglycemia and hypoglycemia  - Administer ordered medications to maintain glucose within target range  - Assess barriers to adequate nutritional intake and initiate nutrition consult as needed  - Instruct patient on self management of diabetes  Outcome: Progressing     Problem: PAIN - ADULT  Goal: Verbalizes/displays adequate comfort level or patient's stated pain goal  Description: INTERVENTIONS:  - Encourage pt to monitor pain and request assistance  - Assess pain using appropriate pain scale  - Administer analgesics based on type and severity of pain and evaluate response  - Implement non-pharmacological measures as appropriate and evaluate response  - Consider cultural and social influences on pain and pain management  - Manage/alleviate anxiety  - Utilize distraction and/or relaxation techniques  - Monitor for opioid side effects  - Notify MD/LIP if interventions unsuccessful or patient reports new pain  - Anticipate increased pain with activity and pre-medicate as appropriate  Outcome: Progressing     Problem: DISCHARGE PLANNING  Goal: Discharge to home or other facility with appropriate resources  Description: INTERVENTIONS:  - Identify barriers to discharge w/pt and caregiver  - Include patient/family/discharge partner in discharge planning  - Arrange for needed discharge resources and transportation as appropriate  - Identify discharge learning needs (meds, wound care, etc)  - Arrange for interpreters to assist at discharge as needed  - Consider post-discharge preferences of patient/family/discharge partner  - Complete POLST form as appropriate  - Assess patient's ability to be responsible for managing their own health  - Refer to Case Management Department for coordinating discharge planning if the patient needs post-hospital services based on physician/LIP order or complex needs related to functional status, cognitive ability or social support system  Outcome: Progressing     Patient is alert and oriented x 4. Vitals stable. On room air. On heparin. NPO. Accucheck q6h. RLQ ileostomy in place with liquid green output. Passing gas. IVF infusing. Denies nausea and pain. Up independently. Fall precautions in place. Call light within reach. Mother at bedside. Patient and mother updated on plan of care. Plan for possible discharge home today.

## 2022-03-10 NOTE — TELEPHONE ENCOUNTER
Please have the staff reach out to him and book apt tmrw with [facility]  If not possible with [facility] due to medicaid then with [facility]  Patient is being discharged, started on insulin and steroids hence needs to be seen tmrw  Thanks

## 2022-03-10 NOTE — PLAN OF CARE
Discussed plan of care for day, including all given medications and their indications. No report of nausea and abdominal pain improved -- no pain medications needed but they remain available. Loose ileostomy output and gas noted. Tolerated full liquids this afternoon and slowly advanced to soft diet for dinner -- tolerated dinner without complication. Comfort measures encouraged to promote restful environment. Cleared for discharge home this evening. Problem: Patient Centered Care  Goal: Patient preferences are identified and integrated in the patient's plan of care  Description: Interventions:  - What would you like us to know as we care for you?  This is my second ileostomy.  - Provide timely, complete, and accurate information to patient/family  - Incorporate patient and family knowledge, values, beliefs, and cultural backgrounds into the planning and delivery of care  - Encourage patient/family to participate in care and decision-making at the level they choose  - Honor patient and family perspectives and choices  Outcome: Progressing     Problem: Patient/Family Goals  Goal: Patient/Family Long Term Goal  Description: Patient's Long Term Goal: Discharge home    Interventions:  - NGT  - Bowel rest/ NPO  - Pain control   - See additional Care Plan goals for specific interventions  Outcome: Progressing  Goal: Patient/Family Short Term Goal  Description: Patient's Short Term Goal: Pain/Nausea relief    Interventions:   - PRN pain medication   - zofran/reglan PRN  - NPO   - See additional Care Plan goals for specific interventions  Outcome: Progressing     Problem: GASTROINTESTINAL - ADULT  Goal: Minimal or absence of nausea and vomiting  Description: INTERVENTIONS:  - Maintain adequate hydration with IV or PO as ordered and tolerated  - Nasogastric tube to low intermittent suction as ordered  - Evaluate effectiveness of ordered antiemetic medications  - Provide nonpharmacologic comfort measures as appropriate  - Advance diet as tolerated, if ordered  - Obtain nutritional consult as needed  - Evaluate fluid balance  Outcome: Progressing  Goal: Maintains or returns to baseline bowel function  Description: INTERVENTIONS:  - Assess bowel function  - Maintain adequate hydration with IV or PO as ordered and tolerated  - Evaluate effectiveness of GI medications  - Encourage mobilization and activity  - Obtain nutritional consult as needed  - Establish a toileting routine/schedule  - Consider collaborating with pharmacy to review patient's medication profile  Outcome: Progressing     Problem: METABOLIC/FLUID AND ELECTROLYTES - ADULT  Goal: Glucose maintained within prescribed range  Description: INTERVENTIONS:  - Monitor Blood Glucose as ordered  - Assess for signs and symptoms of hyperglycemia and hypoglycemia  - Administer ordered medications to maintain glucose within target range  - Assess barriers to adequate nutritional intake and initiate nutrition consult as needed  - Instruct patient on self management of diabetes  Outcome: Progressing     Problem: PAIN - ADULT  Goal: Verbalizes/displays adequate comfort level or patient's stated pain goal  Description: INTERVENTIONS:  - Encourage pt to monitor pain and request assistance  - Assess pain using appropriate pain scale  - Administer analgesics based on type and severity of pain and evaluate response  - Implement non-pharmacological measures as appropriate and evaluate response  - Consider cultural and social influences on pain and pain management  - Manage/alleviate anxiety  - Utilize distraction and/or relaxation techniques  - Monitor for opioid side effects  - Notify MD/LIP if interventions unsuccessful or patient reports new pain  - Anticipate increased pain with activity and pre-medicate as appropriate  Outcome: Progressing     Problem: DISCHARGE PLANNING  Goal: Discharge to home or other facility with appropriate resources  Description: INTERVENTIONS:  - Identify barriers to discharge w/pt and caregiver  - Include patient/family/discharge partner in discharge planning  - Arrange for needed discharge resources and transportation as appropriate  - Identify discharge learning needs (meds, wound care, etc)  - Arrange for interpreters to assist at discharge as needed  - Consider post-discharge preferences of patient/family/discharge partner  - Complete POLST form as appropriate  - Assess patient's ability to be responsible for managing their own health  - Refer to Case Management Department for coordinating discharge planning if the patient needs post-hospital services based on physician/LIP order or complex needs related to functional status, cognitive ability or social support system  Outcome: Progressing     All efforts maintained to promote infection prevention during my assessment and care for this patient. Stethoscope cleansed and hand hygiene strictly maintained.

## 2022-03-10 NOTE — TELEPHONE ENCOUNTER
Called patient and booked him to see Tessie SCHUSTER tomorrow at 12:15 pm. Silvano Mccallum cannot see medicaid patients.

## 2022-03-11 ENCOUNTER — OFFICE VISIT (OUTPATIENT)
Dept: ENDOCRINOLOGY CLINIC | Facility: CLINIC | Age: 37
End: 2022-03-11
Payer: MEDICAID

## 2022-03-11 VITALS
BODY MASS INDEX: 23.54 KG/M2 | DIASTOLIC BLOOD PRESSURE: 71 MMHG | HEIGHT: 67 IN | HEART RATE: 79 BPM | SYSTOLIC BLOOD PRESSURE: 115 MMHG | RESPIRATION RATE: 16 BRPM | WEIGHT: 150 LBS

## 2022-03-11 DIAGNOSIS — Z79.4 TYPE 2 DIABETES MELLITUS WITH OTHER SPECIFIED COMPLICATION, WITH LONG-TERM CURRENT USE OF INSULIN (HCC): Primary | ICD-10-CM

## 2022-03-11 DIAGNOSIS — E11.69 TYPE 2 DIABETES MELLITUS WITH OTHER SPECIFIED COMPLICATION, WITH LONG-TERM CURRENT USE OF INSULIN (HCC): Primary | ICD-10-CM

## 2022-03-11 DIAGNOSIS — T38.0X5A STEROID-INDUCED HYPERGLYCEMIA: ICD-10-CM

## 2022-03-11 DIAGNOSIS — R73.9 STEROID-INDUCED HYPERGLYCEMIA: ICD-10-CM

## 2022-03-11 LAB
GLUCOSE BLDC GLUCOMTR-MCNC: 160 MG/DL (ref 70–99)
GLUCOSE BLDC GLUCOMTR-MCNC: 164 MG/DL (ref 70–99)
GLUCOSE BLOOD: 97
TEST STRIP LOT #: NORMAL NUMERIC

## 2022-03-11 PROCEDURE — 99214 OFFICE O/P EST MOD 30 MIN: CPT | Performed by: INTERNAL MEDICINE

## 2022-03-11 PROCEDURE — 3078F DIAST BP <80 MM HG: CPT | Performed by: INTERNAL MEDICINE

## 2022-03-11 PROCEDURE — 82947 ASSAY GLUCOSE BLOOD QUANT: CPT | Performed by: INTERNAL MEDICINE

## 2022-03-11 PROCEDURE — 3074F SYST BP LT 130 MM HG: CPT | Performed by: INTERNAL MEDICINE

## 2022-03-11 PROCEDURE — 3008F BODY MASS INDEX DOCD: CPT | Performed by: INTERNAL MEDICINE

## 2022-03-11 PROCEDURE — 36416 COLLJ CAPILLARY BLOOD SPEC: CPT | Performed by: INTERNAL MEDICINE

## 2022-03-11 RX ORDER — FAMOTIDINE 20 MG/1
20 TABLET, FILM COATED ORAL DAILY
COMMUNITY
Start: 2021-10-23

## 2022-03-11 RX ORDER — EMPAGLIFLOZIN 25 MG/1
25 TABLET, FILM COATED ORAL DAILY
Qty: 90 TABLET | Refills: 0 | Status: SHIPPED | OUTPATIENT
Start: 2022-03-11 | End: 2022-03-11

## 2022-03-11 RX ORDER — INSULIN LISPRO 100 [IU]/ML
INJECTION, SOLUTION INTRAVENOUS; SUBCUTANEOUS AS NEEDED
Qty: 15 ML | Refills: 0 | Status: SHIPPED | OUTPATIENT
Start: 2022-03-11 | End: 2022-03-11

## 2022-03-11 RX ORDER — MAG HYDROX/ALUMINUM HYD/SIMETH 400-400-40
5000 SUSPENSION, ORAL (FINAL DOSE FORM) ORAL DAILY
COMMUNITY

## 2022-03-11 RX ORDER — ONDANSETRON 4 MG/1
TABLET, FILM COATED ORAL
COMMUNITY
Start: 2022-03-08

## 2022-03-11 RX ORDER — INSULIN LISPRO 100 [IU]/ML
INJECTION, SOLUTION INTRAVENOUS; SUBCUTANEOUS
Qty: 15 ML | Refills: 0 | Status: SHIPPED | OUTPATIENT
Start: 2022-03-11

## 2022-03-11 NOTE — PATIENT INSTRUCTIONS
Take metformin, glipiizde, januvia   Take 3 units humalog ( short acting insulin) with meals only as follows   Pre meal blood sugar     humalog  150-200                             1 unit  201-250                              2 unit  251-300                               3 units  301-350                              4 units  > 351                                  5 units    Maria Dolores Rodriguez

## 2022-03-14 ENCOUNTER — TELEPHONE (OUTPATIENT)
Dept: ENDOCRINOLOGY CLINIC | Facility: CLINIC | Age: 37
End: 2022-03-14

## 2022-03-14 NOTE — TELEPHONE ENCOUNTER
Spoke to patient regarding blood sugars. Per Dr. Jv Iyer: Patient will take Humalog 3 units TID with meals + scale, unless sugar is under 100---patient will only take 2 units. Patient will send us a message tomorrow with an update.

## 2022-03-16 ENCOUNTER — TELEPHONE (OUTPATIENT)
Dept: ENDOCRINOLOGY CLINIC | Facility: CLINIC | Age: 37
End: 2022-03-16

## 2022-03-17 ENCOUNTER — PATIENT OUTREACH (OUTPATIENT)
Dept: CASE MANAGEMENT | Age: 37
End: 2022-03-17

## 2022-03-17 NOTE — PROGRESS NOTES
1St attempt at to review Dm Follow up Question         Diabetic Outreach D/C Follow Up Questions:     1. Did you receive the information provided during your hospitalization and at discharge about diabetes? yes    If No:  Would you like us to mail you the information? no   If Yes:  Was the information helpful? Yes     2. Were there any changes made to your medications? yes            3. Do you have all of your diabetes pills and or insulin now that you are home? Yes  If yes:  do you understand the changes made? yes      4. Are you confident in managing your diabetes? yes     5. Did you make the necessary transportation arrangements to get to your diabetes follow-up appointment? Yes         If pt answers No to questions #3 and #4 Advise pt you will have a clinical person contact them to address.

## 2022-03-25 ENCOUNTER — OFFICE VISIT (OUTPATIENT)
Dept: NEPHROLOGY | Facility: CLINIC | Age: 37
End: 2022-03-25
Payer: MEDICAID

## 2022-03-25 VITALS
SYSTOLIC BLOOD PRESSURE: 118 MMHG | WEIGHT: 152.19 LBS | BODY MASS INDEX: 24.46 KG/M2 | HEART RATE: 81 BPM | DIASTOLIC BLOOD PRESSURE: 80 MMHG | HEIGHT: 66 IN | RESPIRATION RATE: 16 BRPM

## 2022-03-25 DIAGNOSIS — R80.9 NON-NEPHROTIC RANGE PROTEINURIA: Primary | ICD-10-CM

## 2022-03-25 PROCEDURE — 3008F BODY MASS INDEX DOCD: CPT | Performed by: INTERNAL MEDICINE

## 2022-03-25 PROCEDURE — 99214 OFFICE O/P EST MOD 30 MIN: CPT | Performed by: INTERNAL MEDICINE

## 2022-03-25 PROCEDURE — 3079F DIAST BP 80-89 MM HG: CPT | Performed by: INTERNAL MEDICINE

## 2022-03-25 PROCEDURE — 3074F SYST BP LT 130 MM HG: CPT | Performed by: INTERNAL MEDICINE

## 2022-03-25 RX ORDER — MONTELUKAST SODIUM 10 MG/1
10 TABLET ORAL EVERY MORNING
COMMUNITY
Start: 2022-01-27

## 2022-03-25 RX ORDER — BLOOD SUGAR DIAGNOSTIC
STRIP MISCELLANEOUS 3 TIMES DAILY
COMMUNITY
Start: 2022-01-22

## 2022-04-01 ENCOUNTER — OFFICE VISIT (OUTPATIENT)
Dept: HEMATOLOGY/ONCOLOGY | Facility: HOSPITAL | Age: 37
End: 2022-04-01
Attending: INTERNAL MEDICINE
Payer: MEDICAID

## 2022-04-01 VITALS
DIASTOLIC BLOOD PRESSURE: 74 MMHG | RESPIRATION RATE: 16 BRPM | OXYGEN SATURATION: 98 % | HEART RATE: 103 BPM | SYSTOLIC BLOOD PRESSURE: 124 MMHG | TEMPERATURE: 99 F

## 2022-04-01 DIAGNOSIS — D50.0 IRON DEFICIENCY ANEMIA DUE TO CHRONIC BLOOD LOSS: Primary | ICD-10-CM

## 2022-04-01 DIAGNOSIS — D50.8 IRON DEFICIENCY ANEMIA SECONDARY TO INADEQUATE DIETARY IRON INTAKE: ICD-10-CM

## 2022-04-01 DIAGNOSIS — K90.89 OTHER SPECIFIED INTESTINAL MALABSORPTION: ICD-10-CM

## 2022-04-01 PROCEDURE — 96372 THER/PROPH/DIAG INJ SC/IM: CPT

## 2022-04-01 PROCEDURE — 96374 THER/PROPH/DIAG INJ IV PUSH: CPT

## 2022-04-01 RX ORDER — CYANOCOBALAMIN 1000 UG/ML
1000 INJECTION INTRAMUSCULAR; SUBCUTANEOUS ONCE
Status: COMPLETED | OUTPATIENT
Start: 2022-04-01 | End: 2022-04-01

## 2022-04-01 RX ORDER — CYANOCOBALAMIN 1000 UG/ML
1000 INJECTION INTRAMUSCULAR; SUBCUTANEOUS ONCE
Start: 2022-04-15 | End: 2022-04-15

## 2022-04-01 RX ORDER — CYANOCOBALAMIN 1000 UG/ML
INJECTION INTRAMUSCULAR; SUBCUTANEOUS
Status: COMPLETED
Start: 2022-04-01 | End: 2022-04-01

## 2022-04-01 RX ADMIN — CYANOCOBALAMIN 1000 MCG: 1000 INJECTION INTRAMUSCULAR; SUBCUTANEOUS at 16:04:00

## 2022-04-01 NOTE — PROGRESS NOTES
Pt to infusion for monthly venofer and vit B12. Arrives ambulating independently. PIV established to right AC, present blood return noted. Venofer given slow IVP, pt appeared to tolerate well. Pt does not stay for observation. Vit B12 injection given to left deltoid, site covered with bandaid. Discharged stable and ambulating independently with future appointments scheduled.

## 2022-04-29 ENCOUNTER — OFFICE VISIT (OUTPATIENT)
Dept: HEMATOLOGY/ONCOLOGY | Facility: HOSPITAL | Age: 37
End: 2022-04-29
Attending: INTERNAL MEDICINE
Payer: MEDICAID

## 2022-04-29 VITALS
DIASTOLIC BLOOD PRESSURE: 76 MMHG | TEMPERATURE: 98 F | RESPIRATION RATE: 16 BRPM | SYSTOLIC BLOOD PRESSURE: 136 MMHG | HEART RATE: 106 BPM | OXYGEN SATURATION: 98 %

## 2022-04-29 DIAGNOSIS — K90.89 OTHER SPECIFIED INTESTINAL MALABSORPTION: ICD-10-CM

## 2022-04-29 DIAGNOSIS — D50.0 IRON DEFICIENCY ANEMIA DUE TO CHRONIC BLOOD LOSS: Primary | ICD-10-CM

## 2022-04-29 DIAGNOSIS — D50.8 IRON DEFICIENCY ANEMIA SECONDARY TO INADEQUATE DIETARY IRON INTAKE: ICD-10-CM

## 2022-04-29 PROCEDURE — 96372 THER/PROPH/DIAG INJ SC/IM: CPT

## 2022-04-29 PROCEDURE — 96374 THER/PROPH/DIAG INJ IV PUSH: CPT

## 2022-04-29 RX ORDER — CYANOCOBALAMIN 1000 UG/ML
INJECTION INTRAMUSCULAR; SUBCUTANEOUS
Status: COMPLETED
Start: 2022-04-29 | End: 2022-04-29

## 2022-04-29 RX ORDER — CYANOCOBALAMIN 1000 UG/ML
1000 INJECTION INTRAMUSCULAR; SUBCUTANEOUS ONCE
Start: 2022-05-13 | End: 2022-05-13

## 2022-04-29 RX ORDER — CYANOCOBALAMIN 1000 UG/ML
1000 INJECTION INTRAMUSCULAR; SUBCUTANEOUS ONCE
Status: COMPLETED | OUTPATIENT
Start: 2022-04-29 | End: 2022-04-29

## 2022-04-29 RX ADMIN — CYANOCOBALAMIN 1000 MCG: 1000 INJECTION INTRAMUSCULAR; SUBCUTANEOUS at 16:38:00

## 2022-04-29 NOTE — PROGRESS NOTES
Patient arrives for monthly venofer and B12 injection. Reports he is well. B12 given in left deltoid, patient tolerated well. PIV started in left AC, positive blood return noted. Venofer given slowly over 3 minutes, patient tolerated well. PIV removed, site covered with 2x2 and coban. Discharged ambulating independently with future appointments scheduled.

## 2022-05-02 RX ORDER — PEN NEEDLE, DIABETIC 32GX 5/32"
NEEDLE, DISPOSABLE MISCELLANEOUS
Qty: 100 EACH | Refills: 0 | Status: SHIPPED | OUTPATIENT
Start: 2022-05-02

## 2022-05-25 ENCOUNTER — APPOINTMENT (OUTPATIENT)
Dept: CT IMAGING | Facility: HOSPITAL | Age: 37
End: 2022-05-25
Attending: EMERGENCY MEDICINE
Payer: MEDICAID

## 2022-05-25 ENCOUNTER — HOSPITAL ENCOUNTER (EMERGENCY)
Facility: HOSPITAL | Age: 37
Discharge: HOME | End: 2022-05-26
Attending: EMERGENCY MEDICINE
Payer: MEDICAID

## 2022-05-25 VITALS
HEIGHT: 70 IN | HEART RATE: 85 BPM | DIASTOLIC BLOOD PRESSURE: 75 MMHG | WEIGHT: 154 LBS | TEMPERATURE: 97 F | RESPIRATION RATE: 16 BRPM | BODY MASS INDEX: 22.05 KG/M2 | SYSTOLIC BLOOD PRESSURE: 122 MMHG | OXYGEN SATURATION: 99 %

## 2022-05-25 DIAGNOSIS — R10.84 ABDOMINAL PAIN, GENERALIZED: Primary | ICD-10-CM

## 2022-05-25 DIAGNOSIS — Z87.19 HISTORY OF CROHN'S DISEASE: ICD-10-CM

## 2022-05-25 LAB
ANION GAP SERPL CALC-SCNC: 7 MMOL/L (ref 0–18)
BASOPHILS # BLD AUTO: 0.04 X10(3) UL (ref 0–0.2)
BASOPHILS NFR BLD AUTO: 0.4 %
BILIRUB UR QL: NEGATIVE
BUN BLD-MCNC: 9 MG/DL (ref 7–18)
BUN/CREAT SERPL: 8.7 (ref 10–20)
CALCIUM BLD-MCNC: 9.6 MG/DL (ref 8.5–10.1)
CHLORIDE SERPL-SCNC: 105 MMOL/L (ref 98–112)
CO2 SERPL-SCNC: 28 MMOL/L (ref 21–32)
COLOR UR: YELLOW
CREAT BLD-MCNC: 1.03 MG/DL
CRP SERPL-MCNC: 2.2 MG/DL (ref ?–0.3)
DEPRECATED RDW RBC AUTO: 37.6 FL (ref 35.1–46.3)
EOSINOPHIL # BLD AUTO: 0.01 X10(3) UL (ref 0–0.7)
EOSINOPHIL NFR BLD AUTO: 0.1 %
ERYTHROCYTE [DISTWIDTH] IN BLOOD BY AUTOMATED COUNT: 13.2 % (ref 11–15)
GLUCOSE BLD-MCNC: 217 MG/DL (ref 70–99)
GLUCOSE UR-MCNC: 50 MG/DL
HCT VFR BLD AUTO: 46.9 %
HGB BLD-MCNC: 15.4 G/DL
HYALINE CASTS #/AREA URNS AUTO: PRESENT /LPF
IMM GRANULOCYTES # BLD AUTO: 0.11 X10(3) UL (ref 0–1)
IMM GRANULOCYTES NFR BLD: 1 %
KETONES UR-MCNC: 20 MG/DL
LACTATE SERPL-SCNC: 1.8 MMOL/L (ref 0.4–2)
LACTATE SERPL-SCNC: 2.8 MMOL/L (ref 0.4–2)
LEUKOCYTE ESTERASE UR QL STRIP.AUTO: NEGATIVE
LYMPHOCYTES # BLD AUTO: 0.91 X10(3) UL (ref 1–4)
LYMPHOCYTES NFR BLD AUTO: 8.4 %
MCH RBC QN AUTO: 26.3 PG (ref 26–34)
MCHC RBC AUTO-ENTMCNC: 32.8 G/DL (ref 31–37)
MCV RBC AUTO: 80.2 FL
MONOCYTES # BLD AUTO: 0.5 X10(3) UL (ref 0.1–1)
MONOCYTES NFR BLD AUTO: 4.6 %
NEUTROPHILS # BLD AUTO: 9.28 X10 (3) UL (ref 1.5–7.7)
NEUTROPHILS # BLD AUTO: 9.28 X10(3) UL (ref 1.5–7.7)
NEUTROPHILS NFR BLD AUTO: 85.5 %
NITRITE UR QL STRIP.AUTO: NEGATIVE
OSMOLALITY SERPL CALC.SUM OF ELEC: 295 MOSM/KG (ref 275–295)
PH UR: 5 [PH] (ref 5–8)
PLATELET # BLD AUTO: 278 10(3)UL (ref 150–450)
POTASSIUM SERPL-SCNC: 4.2 MMOL/L (ref 3.5–5.1)
PROT UR-MCNC: 30 MG/DL
RBC # BLD AUTO: 5.85 X10(6)UL
SARS-COV-2 RNA RESP QL NAA+PROBE: NOT DETECTED
SODIUM SERPL-SCNC: 140 MMOL/L (ref 136–145)
SP GR UR STRIP: 1.02 (ref 1–1.03)
UROBILINOGEN UR STRIP-ACNC: <2
VIT C UR-MCNC: NEGATIVE MG/DL
WBC # BLD AUTO: 10.9 X10(3) UL (ref 4–11)

## 2022-05-25 PROCEDURE — 85025 COMPLETE CBC W/AUTO DIFF WBC: CPT | Performed by: EMERGENCY MEDICINE

## 2022-05-25 PROCEDURE — 96375 TX/PRO/DX INJ NEW DRUG ADDON: CPT

## 2022-05-25 PROCEDURE — 83605 ASSAY OF LACTIC ACID: CPT | Performed by: EMERGENCY MEDICINE

## 2022-05-25 PROCEDURE — 80048 BASIC METABOLIC PNL TOTAL CA: CPT | Performed by: EMERGENCY MEDICINE

## 2022-05-25 PROCEDURE — 99285 EMERGENCY DEPT VISIT HI MDM: CPT

## 2022-05-25 PROCEDURE — 96374 THER/PROPH/DIAG INJ IV PUSH: CPT

## 2022-05-25 PROCEDURE — 74176 CT ABD & PELVIS W/O CONTRAST: CPT | Performed by: EMERGENCY MEDICINE

## 2022-05-25 PROCEDURE — 96361 HYDRATE IV INFUSION ADD-ON: CPT

## 2022-05-25 PROCEDURE — 81001 URINALYSIS AUTO W/SCOPE: CPT | Performed by: EMERGENCY MEDICINE

## 2022-05-25 PROCEDURE — 86140 C-REACTIVE PROTEIN: CPT | Performed by: EMERGENCY MEDICINE

## 2022-05-25 RX ORDER — MORPHINE SULFATE 4 MG/ML
4 INJECTION, SOLUTION INTRAMUSCULAR; INTRAVENOUS ONCE
Status: COMPLETED | OUTPATIENT
Start: 2022-05-25 | End: 2022-05-25

## 2022-05-25 RX ORDER — ONDANSETRON 4 MG/1
4 TABLET, ORALLY DISINTEGRATING ORAL EVERY 8 HOURS PRN
Qty: 15 TABLET | Refills: 0 | Status: SHIPPED | OUTPATIENT
Start: 2022-05-25 | End: 2022-05-30

## 2022-05-25 RX ORDER — ONDANSETRON 2 MG/ML
4 INJECTION INTRAMUSCULAR; INTRAVENOUS ONCE
Status: COMPLETED | OUTPATIENT
Start: 2022-05-25 | End: 2022-05-25

## 2022-05-25 RX ORDER — HYDROCODONE BITARTRATE AND ACETAMINOPHEN 5; 325 MG/1; MG/1
1 TABLET ORAL EVERY 8 HOURS PRN
Qty: 6 TABLET | Refills: 0 | Status: SHIPPED | OUTPATIENT
Start: 2022-05-25 | End: 2022-05-27

## 2022-05-25 NOTE — ED INITIAL ASSESSMENT (HPI)
Patient arrives ambulatory through triage with complaints of left sided abdominal pain since this AM. +emesis  Ostomy with no output since this AM.   Hx bowel obstructions.

## 2022-06-01 RX ORDER — PEN NEEDLE, DIABETIC 32GX 5/32"
NEEDLE, DISPOSABLE MISCELLANEOUS
Qty: 300 EACH | Refills: 0 | Status: SHIPPED | OUTPATIENT
Start: 2022-06-01 | End: 2022-06-02

## 2022-06-02 RX ORDER — PEN NEEDLE, DIABETIC 32GX 5/32"
NEEDLE, DISPOSABLE MISCELLANEOUS
Qty: 100 EACH | Refills: 0 | Status: SHIPPED | OUTPATIENT
Start: 2022-06-02

## 2022-06-03 ENCOUNTER — OFFICE VISIT (OUTPATIENT)
Dept: HEMATOLOGY/ONCOLOGY | Facility: HOSPITAL | Age: 37
End: 2022-06-03
Attending: INTERNAL MEDICINE
Payer: MEDICAID

## 2022-06-03 VITALS
SYSTOLIC BLOOD PRESSURE: 127 MMHG | TEMPERATURE: 98 F | OXYGEN SATURATION: 98 % | DIASTOLIC BLOOD PRESSURE: 70 MMHG | RESPIRATION RATE: 16 BRPM | HEART RATE: 91 BPM

## 2022-06-03 DIAGNOSIS — D50.0 IRON DEFICIENCY ANEMIA DUE TO CHRONIC BLOOD LOSS: Primary | ICD-10-CM

## 2022-06-03 DIAGNOSIS — K90.89 OTHER SPECIFIED INTESTINAL MALABSORPTION: ICD-10-CM

## 2022-06-03 DIAGNOSIS — D50.8 IRON DEFICIENCY ANEMIA SECONDARY TO INADEQUATE DIETARY IRON INTAKE: ICD-10-CM

## 2022-06-03 PROCEDURE — 96374 THER/PROPH/DIAG INJ IV PUSH: CPT

## 2022-06-03 PROCEDURE — 96372 THER/PROPH/DIAG INJ SC/IM: CPT

## 2022-06-03 RX ORDER — CYANOCOBALAMIN 1000 UG/ML
INJECTION INTRAMUSCULAR; SUBCUTANEOUS
Status: COMPLETED
Start: 2022-06-03 | End: 2022-06-03

## 2022-06-03 RX ORDER — CYANOCOBALAMIN 1000 UG/ML
1000 INJECTION INTRAMUSCULAR; SUBCUTANEOUS ONCE
Status: COMPLETED | OUTPATIENT
Start: 2022-06-03 | End: 2022-06-03

## 2022-06-03 RX ORDER — CYANOCOBALAMIN 1000 UG/ML
1000 INJECTION INTRAMUSCULAR; SUBCUTANEOUS ONCE
Start: 2022-06-10 | End: 2022-06-10

## 2022-06-03 RX ADMIN — CYANOCOBALAMIN 1000 MCG: 1000 INJECTION INTRAMUSCULAR; SUBCUTANEOUS at 16:16:00

## 2022-06-03 NOTE — PROGRESS NOTES
Patient arrives for monthly venofer and B12 injection. Arrives ambulating independently. B12 given in left deltoid, patient tolerated well. PIV established to left [facility] - present blood return noted. Venofer given slowly over 2 minutes, patient tolerated well. PIV removed, site covered with gauze and coban. Discharged ambulating independently with future appointments scheduled. Pt is active on [facility].

## 2022-06-18 ENCOUNTER — APPOINTMENT (OUTPATIENT)
Dept: GENERAL RADIOLOGY | Facility: HOSPITAL | Age: 37
End: 2022-06-18
Attending: EMERGENCY MEDICINE
Payer: MEDICAID

## 2022-06-18 ENCOUNTER — HOSPITAL ENCOUNTER (INPATIENT)
Facility: HOSPITAL | Age: 37
LOS: 2 days | Discharge: HOME | End: 2022-06-20
Attending: EMERGENCY MEDICINE | Admitting: HOSPITALIST
Payer: MEDICAID

## 2022-06-18 ENCOUNTER — APPOINTMENT (OUTPATIENT)
Dept: CT IMAGING | Facility: HOSPITAL | Age: 37
End: 2022-06-18
Attending: EMERGENCY MEDICINE
Payer: MEDICAID

## 2022-06-18 DIAGNOSIS — K56.609 SBO (SMALL BOWEL OBSTRUCTION) (HCC): Primary | ICD-10-CM

## 2022-06-18 LAB
ANION GAP SERPL CALC-SCNC: 8 MMOL/L (ref 0–18)
BASOPHILS # BLD AUTO: 0.03 X10(3) UL (ref 0–0.2)
BASOPHILS NFR BLD AUTO: 0.3 %
BUN BLD-MCNC: 13 MG/DL (ref 7–18)
BUN/CREAT SERPL: 12.6 (ref 10–20)
CALCIUM BLD-MCNC: 9.6 MG/DL (ref 8.5–10.1)
CHLORIDE SERPL-SCNC: 104 MMOL/L (ref 98–112)
CO2 SERPL-SCNC: 28 MMOL/L (ref 21–32)
CREAT BLD-MCNC: 1.03 MG/DL
CRP SERPL-MCNC: 2.5 MG/DL (ref ?–0.3)
DEPRECATED RDW RBC AUTO: 36.5 FL (ref 35.1–46.3)
EOSINOPHIL # BLD AUTO: 0.07 X10(3) UL (ref 0–0.7)
EOSINOPHIL NFR BLD AUTO: 0.8 %
ERYTHROCYTE [DISTWIDTH] IN BLOOD BY AUTOMATED COUNT: 12.9 % (ref 11–15)
GLUCOSE BLD-MCNC: 300 MG/DL (ref 70–99)
GLUCOSE BLDC GLUCOMTR-MCNC: 229 MG/DL (ref 70–99)
GLUCOSE BLDC GLUCOMTR-MCNC: 281 MG/DL (ref 70–99)
GLUCOSE BLDC GLUCOMTR-MCNC: 305 MG/DL (ref 70–99)
HCT VFR BLD AUTO: 43.2 %
HGB BLD-MCNC: 14.6 G/DL
IMM GRANULOCYTES # BLD AUTO: 0.07 X10(3) UL (ref 0–1)
IMM GRANULOCYTES NFR BLD: 0.8 %
LYMPHOCYTES # BLD AUTO: 1.61 X10(3) UL (ref 1–4)
LYMPHOCYTES NFR BLD AUTO: 17.5 %
MCH RBC QN AUTO: 26.8 PG (ref 26–34)
MCHC RBC AUTO-ENTMCNC: 33.8 G/DL (ref 31–37)
MCV RBC AUTO: 79.3 FL
MONOCYTES # BLD AUTO: 0.7 X10(3) UL (ref 0.1–1)
MONOCYTES NFR BLD AUTO: 7.6 %
NEUTROPHILS # BLD AUTO: 6.73 X10 (3) UL (ref 1.5–7.7)
NEUTROPHILS # BLD AUTO: 6.73 X10(3) UL (ref 1.5–7.7)
NEUTROPHILS NFR BLD AUTO: 73 %
OSMOLALITY SERPL CALC.SUM OF ELEC: 301 MOSM/KG (ref 275–295)
PLATELET # BLD AUTO: 280 10(3)UL (ref 150–450)
POTASSIUM SERPL-SCNC: 3.6 MMOL/L (ref 3.5–5.1)
RBC # BLD AUTO: 5.45 X10(6)UL
SARS-COV-2 RNA RESP QL NAA+PROBE: NOT DETECTED
SODIUM SERPL-SCNC: 140 MMOL/L (ref 136–145)
WBC # BLD AUTO: 9.2 X10(3) UL (ref 4–11)

## 2022-06-18 PROCEDURE — 99223 1ST HOSP IP/OBS HIGH 75: CPT | Performed by: HOSPITALIST

## 2022-06-18 PROCEDURE — 99254 IP/OBS CNSLTJ NEW/EST MOD 60: CPT | Performed by: INTERNAL MEDICINE

## 2022-06-18 PROCEDURE — 71045 X-RAY EXAM CHEST 1 VIEW: CPT | Performed by: EMERGENCY MEDICINE

## 2022-06-18 PROCEDURE — 74176 CT ABD & PELVIS W/O CONTRAST: CPT | Performed by: EMERGENCY MEDICINE

## 2022-06-18 RX ORDER — MORPHINE SULFATE 4 MG/ML
4 INJECTION, SOLUTION INTRAMUSCULAR; INTRAVENOUS ONCE
Status: COMPLETED | OUTPATIENT
Start: 2022-06-18 | End: 2022-06-18

## 2022-06-18 RX ORDER — NICOTINE POLACRILEX 4 MG
15 LOZENGE BUCCAL
Status: DISCONTINUED | OUTPATIENT
Start: 2022-06-18 | End: 2022-06-20

## 2022-06-18 RX ORDER — METHYLPREDNISOLONE SODIUM SUCCINATE 40 MG/ML
20 INJECTION, POWDER, LYOPHILIZED, FOR SOLUTION INTRAMUSCULAR; INTRAVENOUS EVERY 8 HOURS
Status: DISCONTINUED | OUTPATIENT
Start: 2022-06-18 | End: 2022-06-20

## 2022-06-18 RX ORDER — ONDANSETRON 2 MG/ML
4 INJECTION INTRAMUSCULAR; INTRAVENOUS ONCE
Status: COMPLETED | OUTPATIENT
Start: 2022-06-18 | End: 2022-06-18

## 2022-06-18 RX ORDER — SODIUM CHLORIDE, SODIUM LACTATE, POTASSIUM CHLORIDE, CALCIUM CHLORIDE 600; 310; 30; 20 MG/100ML; MG/100ML; MG/100ML; MG/100ML
INJECTION, SOLUTION INTRAVENOUS CONTINUOUS
Status: DISCONTINUED | OUTPATIENT
Start: 2022-06-18 | End: 2022-06-20

## 2022-06-18 RX ORDER — METHYLPREDNISOLONE SODIUM SUCCINATE 40 MG/ML
40 INJECTION, POWDER, LYOPHILIZED, FOR SOLUTION INTRAMUSCULAR; INTRAVENOUS ONCE
Status: COMPLETED | OUTPATIENT
Start: 2022-06-18 | End: 2022-06-18

## 2022-06-18 RX ORDER — ONDANSETRON 2 MG/ML
4 INJECTION INTRAMUSCULAR; INTRAVENOUS EVERY 4 HOURS PRN
Status: DISCONTINUED | OUTPATIENT
Start: 2022-06-18 | End: 2022-06-20

## 2022-06-18 RX ORDER — HEPARIN SODIUM 5000 [USP'U]/ML
5000 INJECTION, SOLUTION INTRAVENOUS; SUBCUTANEOUS EVERY 12 HOURS SCHEDULED
Status: DISCONTINUED | OUTPATIENT
Start: 2022-06-18 | End: 2022-06-20

## 2022-06-18 RX ORDER — NICOTINE POLACRILEX 4 MG
30 LOZENGE BUCCAL
Status: DISCONTINUED | OUTPATIENT
Start: 2022-06-18 | End: 2022-06-20

## 2022-06-18 RX ORDER — MORPHINE SULFATE 2 MG/ML
2 INJECTION, SOLUTION INTRAMUSCULAR; INTRAVENOUS EVERY 2 HOUR PRN
Status: DISCONTINUED | OUTPATIENT
Start: 2022-06-18 | End: 2022-06-20

## 2022-06-18 RX ORDER — MORPHINE SULFATE 2 MG/ML
1 INJECTION, SOLUTION INTRAMUSCULAR; INTRAVENOUS EVERY 2 HOUR PRN
Status: DISCONTINUED | OUTPATIENT
Start: 2022-06-18 | End: 2022-06-20

## 2022-06-18 RX ORDER — DEXTROSE MONOHYDRATE 25 G/50ML
50 INJECTION, SOLUTION INTRAVENOUS
Status: DISCONTINUED | OUTPATIENT
Start: 2022-06-18 | End: 2022-06-20

## 2022-06-18 RX ORDER — MORPHINE SULFATE 4 MG/ML
4 INJECTION, SOLUTION INTRAMUSCULAR; INTRAVENOUS EVERY 2 HOUR PRN
Status: DISCONTINUED | OUTPATIENT
Start: 2022-06-18 | End: 2022-06-20

## 2022-06-18 NOTE — ED QUICK NOTES
Orders for admission, patient is aware of plan and ready to go upstairs.  Any questions, please call ED RTaylor at [phone]     Patient Covid vaccination status: Fully vaccinated     COVID Test Ordered in ED: Rapid SARS-CoV-2 by PCR    COVID Suspicion at Admission: N/A    Running Infusions:  None    Mental Status/LOC at time of transport: X4    Other pertinent information:   CIWA score: N/A   NIH score:  N/A

## 2022-06-18 NOTE — PROGRESS NOTES
Patient remains stable. NPO. NG to LIS with clear to green output. IV fluids running at 125ml/hr. Pain managed with IV morphine.

## 2022-06-18 NOTE — ED QUICK NOTES
NG tube was placed, pt did not tolerate it well and removed the NG-tube. Will notify MD. Pt is also asking for pain medication, will notify MD as well. RN report was given, care was handed off to morning shift nurse.

## 2022-06-18 NOTE — PLAN OF CARE
Patient is alert and oriented x4. Showing no signs or symptoms of any distress. ED admit, arrived to the floor around 1020 due to sudden onset of abdominal pain, CT revealing small bowel obstruction. NG tube to the left nare, low intermittent suction, clear drainage. Pain is bieng managed with morphine. NPO. IV fluids running, LR at 125 ml/hr. Activity as tolerated. Diabetic, blood sugars Q6, insulin as ordered. Heparin and SCDs on bilaterally for DVT prophylaxis. Safety precautions in place. Plan is for home when medically cleared.      Problem: Patient Centered Care  Goal: Patient preferences are identified and integrated in the patient's plan of care  Description: Interventions:  - What would you like us to know as we care for you?   - Provide timely, complete, and accurate information to patient/family  - Incorporate patient and family knowledge, values, beliefs, and cultural backgrounds into the planning and delivery of care  - Encourage patient/family to participate in care and decision-making at the level they choose  - Honor patient and family perspectives and choices  Outcome: Progressing     Problem: Diabetes/Glucose Control  Goal: Glucose maintained within prescribed range  Description: INTERVENTIONS:  - Monitor Blood Glucose as ordered  - Assess for signs and symptoms of hyperglycemia and hypoglycemia  - Administer ordered medications to maintain glucose within target range  - Assess barriers to adequate nutritional intake and initiate nutrition consult as needed  - Instruct patient on self management of diabetes  Outcome: Progressing

## 2022-06-19 ENCOUNTER — APPOINTMENT (OUTPATIENT)
Dept: GENERAL RADIOLOGY | Facility: HOSPITAL | Age: 37
End: 2022-06-19
Attending: SURGERY
Payer: MEDICAID

## 2022-06-19 LAB
ALBUMIN SERPL-MCNC: 2.8 G/DL (ref 3.4–5)
ANION GAP SERPL CALC-SCNC: 10 MMOL/L (ref 0–18)
BASOPHILS # BLD AUTO: 0.02 X10(3) UL (ref 0–0.2)
BASOPHILS NFR BLD AUTO: 0.2 %
BUN BLD-MCNC: 17 MG/DL (ref 7–18)
BUN/CREAT SERPL: 18.5 (ref 10–20)
CALCIUM BLD-MCNC: 9 MG/DL (ref 8.5–10.1)
CHLORIDE SERPL-SCNC: 103 MMOL/L (ref 98–112)
CO2 SERPL-SCNC: 29 MMOL/L (ref 21–32)
CREAT BLD-MCNC: 0.92 MG/DL
DEPRECATED RDW RBC AUTO: 38.2 FL (ref 35.1–46.3)
EOSINOPHIL # BLD AUTO: 0 X10(3) UL (ref 0–0.7)
EOSINOPHIL NFR BLD AUTO: 0 %
ERYTHROCYTE [DISTWIDTH] IN BLOOD BY AUTOMATED COUNT: 12.8 % (ref 11–15)
GLUCOSE BLD-MCNC: 270 MG/DL (ref 70–99)
GLUCOSE BLDC GLUCOMTR-MCNC: 217 MG/DL (ref 70–99)
GLUCOSE BLDC GLUCOMTR-MCNC: 229 MG/DL (ref 70–99)
GLUCOSE BLDC GLUCOMTR-MCNC: 234 MG/DL (ref 70–99)
GLUCOSE BLDC GLUCOMTR-MCNC: 261 MG/DL (ref 70–99)
HCT VFR BLD AUTO: 42.2 %
HGB BLD-MCNC: 13.8 G/DL
IMM GRANULOCYTES # BLD AUTO: 0.07 X10(3) UL (ref 0–1)
IMM GRANULOCYTES NFR BLD: 0.6 %
LYMPHOCYTES # BLD AUTO: 1.06 X10(3) UL (ref 1–4)
LYMPHOCYTES NFR BLD AUTO: 8.9 %
MAGNESIUM SERPL-MCNC: 2.1 MG/DL (ref 1.6–2.6)
MCH RBC QN AUTO: 26.6 PG (ref 26–34)
MCHC RBC AUTO-ENTMCNC: 32.7 G/DL (ref 31–37)
MCV RBC AUTO: 81.5 FL
MONOCYTES # BLD AUTO: 0.37 X10(3) UL (ref 0.1–1)
MONOCYTES NFR BLD AUTO: 3.1 %
NEUTROPHILS # BLD AUTO: 10.4 X10 (3) UL (ref 1.5–7.7)
NEUTROPHILS # BLD AUTO: 10.4 X10(3) UL (ref 1.5–7.7)
NEUTROPHILS NFR BLD AUTO: 87.2 %
OSMOLALITY SERPL CALC.SUM OF ELEC: 305 MOSM/KG (ref 275–295)
PHOSPHATE SERPL-MCNC: 4.6 MG/DL (ref 2.5–4.9)
PLATELET # BLD AUTO: 270 10(3)UL (ref 150–450)
POTASSIUM SERPL-SCNC: 4.2 MMOL/L (ref 3.5–5.1)
RBC # BLD AUTO: 5.18 X10(6)UL
SODIUM SERPL-SCNC: 142 MMOL/L (ref 136–145)
WBC # BLD AUTO: 11.9 X10(3) UL (ref 4–11)

## 2022-06-19 PROCEDURE — 99232 SBSQ HOSP IP/OBS MODERATE 35: CPT | Performed by: INTERNAL MEDICINE

## 2022-06-19 PROCEDURE — 74019 RADEX ABDOMEN 2 VIEWS: CPT | Performed by: SURGERY

## 2022-06-19 PROCEDURE — 99233 SBSQ HOSP IP/OBS HIGH 50: CPT | Performed by: HOSPITALIST

## 2022-06-19 NOTE — PLAN OF CARE
Jerri Santillan is alert and oriented. Self care. Passed large soft BM through colostomy. Denies nausea or pain. Obstructive xray series completed. NG to LIS, clamped at 1630 for 2 hours, then removed. Safety precautions in place.     Problem: Patient Centered Care  Goal: Patient preferences are identified and integrated in the patient's plan of care  Description: Interventions:  - What would you like us to know as we care for you?   - Provide timely, complete, and accurate information to patient/family  - Incorporate patient and family knowledge, values, beliefs, and cultural backgrounds into the planning and delivery of care  - Encourage patient/family to participate in care and decision-making at the level they choose  - Honor patient and family perspectives and choices  6/19/2022 1332 by Roger Rubi RN  Outcome: Progressing  6/19/2022 1327 by Roger Rubi RN  Outcome: Progressing     Problem: Diabetes/Glucose Control  Goal: Glucose maintained within prescribed range  Description: INTERVENTIONS:  - Monitor Blood Glucose as ordered  - Assess for signs and symptoms of hyperglycemia and hypoglycemia  - Administer ordered medications to maintain glucose within target range  - Assess barriers to adequate nutritional intake and initiate nutrition consult as needed  - Instruct patient on self management of diabetes  6/19/2022 1332 by Roger Rubi RN  Outcome: Progressing  6/19/2022 1327 by Roger Rubi RN  Outcome: Progressing     Problem: Patient/Family Goals  Goal: Patient/Family Long Term Goal  Description: Patient's Long Term Goal:     Interventions:  -   - See additional Care Plan goals for specific interventions  6/19/2022 1332 by Roger Rubi RN  Outcome: Progressing  6/19/2022 1327 by Roger Rubi RN  Outcome: Progressing  Goal: Patient/Family Short Term Goal  Description: Patient's Short Term Goal:     Interventions:   -   - See additional Care Plan goals for specific interventions  6/19/2022 1332 by Ari Alanis, RN  Outcome: Progressing  6/19/2022 1327 by Ari Alanis RN  Outcome: Progressing

## 2022-06-19 NOTE — PLAN OF CARE
Patient is A&Ox4. 2 family members visited in the evening. NG tube to Left nostril on low-intermittent suction, bile/green output. PRN Zofran given for intermittent nausea. PRN morphine given for abdominal pain/cramping. Up independently after set-up. Subcutaneous heparin for VTE prophylaxis. Patient educated on NPO status. Given mouth swabs per patient request. Receiving IV Solumedrol Q8h. IVF running. Accuchecks q6h, [place] blood sugar 234. Administered 3 units Novolin per ISS. Ileostomy emptied in evening. Per GI note, possible transfer to [facility] if no improvement in clinical status. Problem: Patient Centered Care  Goal: Patient preferences are identified and integrated in the patient's plan of care  Description: Interventions:  - What would you like us to know as we care for you? From home  - Provide timely, complete, and accurate information to patient/family  - Incorporate patient and family knowledge, values, beliefs, and cultural backgrounds into the planning and delivery of care  - Encourage patient/family to participate in care and decision-making at the level they choose  - Honor patient and family perspectives and choices  Outcome: Progressing     Problem: Diabetes/Glucose Control  Goal: Glucose maintained within prescribed range  Description: INTERVENTIONS:  - Monitor Blood Glucose as ordered  - Assess for signs and symptoms of hyperglycemia and hypoglycemia  - Administer ordered medications to maintain glucose within target range  - Assess barriers to adequate nutritional intake and initiate nutrition consult as needed  - Instruct patient on self management of diabetes  Outcome: Progressing     Problem: Patient/Family Goals  Goal: Patient/Family Long Term Goal  Description: Patient's Long Term Goal: My long term goal is to be able to discharge from the hospital to home or rehab. Interventions:  - PT, OT, pain meds, non-pharmacologic pain control, education on pain meds.   - See additional Care Plan goals for specific interventions  Outcome: Progressing  Goal: Patient/Family Short Term Goal  Description: Patient's Short Term Goal: My goal is to have my pain be controlled to a 5 or less. Interventions:   - PT, OT, pain meds, non-pharmacologic pain control, education on pain meds.   - See additional Care Plan goals for specific interventions  Outcome: Progressing

## 2022-06-19 NOTE — PLAN OF CARE
Problem: Patient Centered Care  Goal: Patient preferences are identified and integrated in the patient's plan of care  Description: Interventions:  - What would you like us to know as we care for you? ***  - Provide timely, complete, and accurate information to patient/family  - Incorporate patient and family knowledge, values, beliefs, and cultural backgrounds into the planning and delivery of care  - Encourage patient/family to participate in care and decision-making at the level they choose  - Honor patient and family perspectives and choices  Outcome: Progressing     Problem: Diabetes/Glucose Control  Goal: Glucose maintained within prescribed range  Description: INTERVENTIONS:  - Monitor Blood Glucose as ordered  - Assess for signs and symptoms of hyperglycemia and hypoglycemia  - Administer ordered medications to maintain glucose within target range  - Assess barriers to adequate nutritional intake and initiate nutrition consult as needed  - Instruct patient on self management of diabetes  Outcome: Progressing     Problem: Patient/Family Goals  Goal: Patient/Family Long Term Goal  Description: Patient's Long Term Goal: ***    Interventions:  - ***  - See additional Care Plan goals for specific interventions  Outcome: Progressing  Goal: Patient/Family Short Term Goal  Description: Patient's Short Term Goal: ***    Interventions:   - ***  - See additional Care Plan goals for specific interventions  Outcome: Progressing

## 2022-06-20 VITALS
SYSTOLIC BLOOD PRESSURE: 122 MMHG | TEMPERATURE: 98 F | OXYGEN SATURATION: 99 % | RESPIRATION RATE: 18 BRPM | DIASTOLIC BLOOD PRESSURE: 78 MMHG | BODY MASS INDEX: 24.33 KG/M2 | WEIGHT: 155 LBS | HEIGHT: 67 IN | HEART RATE: 70 BPM

## 2022-06-20 LAB
ANION GAP SERPL CALC-SCNC: 6 MMOL/L (ref 0–18)
BASOPHILS # BLD AUTO: 0.01 X10(3) UL (ref 0–0.2)
BASOPHILS NFR BLD AUTO: 0.1 %
BUN BLD-MCNC: 17 MG/DL (ref 7–18)
BUN/CREAT SERPL: 20 (ref 10–20)
CALCIUM BLD-MCNC: 8.5 MG/DL (ref 8.5–10.1)
CHLORIDE SERPL-SCNC: 98 MMOL/L (ref 98–112)
CO2 SERPL-SCNC: 31 MMOL/L (ref 21–32)
CREAT BLD-MCNC: 0.85 MG/DL
DEPRECATED RDW RBC AUTO: 37.8 FL (ref 35.1–46.3)
EOSINOPHIL # BLD AUTO: 0 X10(3) UL (ref 0–0.7)
EOSINOPHIL NFR BLD AUTO: 0 %
ERYTHROCYTE [DISTWIDTH] IN BLOOD BY AUTOMATED COUNT: 12.6 % (ref 11–15)
GLUCOSE BLD-MCNC: 256 MG/DL (ref 70–99)
GLUCOSE BLDC GLUCOMTR-MCNC: 243 MG/DL (ref 70–99)
GLUCOSE BLDC GLUCOMTR-MCNC: 252 MG/DL (ref 70–99)
GLUCOSE BLDC GLUCOMTR-MCNC: 266 MG/DL (ref 70–99)
HCT VFR BLD AUTO: 40.7 %
HGB BLD-MCNC: 13.1 G/DL
IMM GRANULOCYTES # BLD AUTO: 0.02 X10(3) UL (ref 0–1)
IMM GRANULOCYTES NFR BLD: 0.3 %
LYMPHOCYTES # BLD AUTO: 0.87 X10(3) UL (ref 1–4)
LYMPHOCYTES NFR BLD AUTO: 12.7 %
MCH RBC QN AUTO: 26.4 PG (ref 26–34)
MCHC RBC AUTO-ENTMCNC: 32.2 G/DL (ref 31–37)
MCV RBC AUTO: 81.9 FL
MONOCYTES # BLD AUTO: 0.27 X10(3) UL (ref 0.1–1)
MONOCYTES NFR BLD AUTO: 3.9 %
NEUTROPHILS # BLD AUTO: 5.69 X10 (3) UL (ref 1.5–7.7)
NEUTROPHILS # BLD AUTO: 5.69 X10(3) UL (ref 1.5–7.7)
NEUTROPHILS NFR BLD AUTO: 83 %
OSMOLALITY SERPL CALC.SUM OF ELEC: 290 MOSM/KG (ref 275–295)
PLATELET # BLD AUTO: 241 10(3)UL (ref 150–450)
POTASSIUM SERPL-SCNC: 4.3 MMOL/L (ref 3.5–5.1)
RBC # BLD AUTO: 4.97 X10(6)UL
SODIUM SERPL-SCNC: 135 MMOL/L (ref 136–145)
WBC # BLD AUTO: 6.9 X10(3) UL (ref 4–11)

## 2022-06-20 PROCEDURE — 99239 HOSP IP/OBS DSCHRG MGMT >30: CPT | Performed by: HOSPITALIST

## 2022-06-20 PROCEDURE — 99233 SBSQ HOSP IP/OBS HIGH 50: CPT | Performed by: PHYSICIAN ASSISTANT

## 2022-06-20 NOTE — PLAN OF CARE
Discharge instructions given to patient. He verbalizes understanding. If pt is to have any nausea and vomiting /abd pain instructed pt to go to [place]. PIV removed and patient discharged in stable condition.      Problem: Patient Centered Care  Goal: Patient preferences are identified and integrated in the patient's plan of care  Description: Interventions:  - What would you like us to know as we care for you?  - Provide timely, complete, and accurate information to patient/family  - Incorporate patient and family knowledge, values, beliefs, and cultural backgrounds into the planning and delivery of care  - Encourage patient/family to participate in care and decision-making at the level they choose  - Honor patient and family perspectives and choices  Outcome: Adequate for Discharge     Problem: Diabetes/Glucose Control  Goal: Glucose maintained within prescribed range  Description: INTERVENTIONS:  - Monitor Blood Glucose as ordered  - Assess for signs and symptoms of hyperglycemia and hypoglycemia  - Administer ordered medications to maintain glucose within target range  - Assess barriers to adequate nutritional intake and initiate nutrition consult as needed  - Instruct patient on self management of diabetes  Outcome: Adequate for Discharge

## 2022-06-20 NOTE — PLAN OF CARE
Pt A/Ox4. Denies pain. NPO with sips. Accuchecks Q6. IVF running. Colostomy intact, pt empties own bag. Soft brown output from ostomy. Up independently. Call light within reach. Safety precautions in place. Plan to advance diet today.        Problem: Patient Centered Care  Goal: Patient preferences are identified and integrated in the patient's plan of care  Description: Interventions:  - What would you like us to know as we care for you?   - Provide timely, complete, and accurate information to patient/family  - Incorporate patient and family knowledge, values, beliefs, and cultural backgrounds into the planning and delivery of care  - Encourage patient/family to participate in care and decision-making at the level they choose  - Honor patient and family perspectives and choices  Outcome: Progressing     Problem: Diabetes/Glucose Control  Goal: Glucose maintained within prescribed range  Description: INTERVENTIONS:  - Monitor Blood Glucose as ordered  - Assess for signs and symptoms of hyperglycemia and hypoglycemia  - Administer ordered medications to maintain glucose within target range  - Assess barriers to adequate nutritional intake and initiate nutrition consult as needed  - Instruct patient on self management of diabetes  Outcome: Progressing     Problem: Patient/Family Goals  Goal: Patient/Family Long Term Goal  Description: Patient's Long Term Goal:     Interventions:  -   - See additional Care Plan goals for specific interventions  Outcome: Progressing  Goal: Patient/Family Short Term Goal  Description: Patient's Short Term Goal:     Interventions:   -   - See additional Care Plan goals for specific interventions  Outcome: Progressing

## 2022-06-21 NOTE — PAYOR COMM NOTE
Discharge Notification    Patient Name: Sarah De Jesus  Payor: Jonathan Sebastian #: [id number]  Authorization Number: [id number]  Admit Date/Time: 6/18/2022 4:13 AM  Discharge Date/Time: 6/20/2022 3:39 PM

## 2022-06-24 ENCOUNTER — APPOINTMENT (OUTPATIENT)
Dept: HEMATOLOGY/ONCOLOGY | Facility: HOSPITAL | Age: 37
End: 2022-06-24
Attending: INTERNAL MEDICINE
Payer: MEDICAID

## 2022-07-01 ENCOUNTER — OFFICE VISIT (OUTPATIENT)
Dept: HEMATOLOGY/ONCOLOGY | Facility: HOSPITAL | Age: 37
End: 2022-07-01
Attending: INTERNAL MEDICINE
Payer: MEDICAID

## 2022-07-01 VITALS
DIASTOLIC BLOOD PRESSURE: 73 MMHG | OXYGEN SATURATION: 98 % | TEMPERATURE: 98 F | SYSTOLIC BLOOD PRESSURE: 115 MMHG | RESPIRATION RATE: 16 BRPM | HEART RATE: 96 BPM

## 2022-07-01 DIAGNOSIS — K90.89 OTHER SPECIFIED INTESTINAL MALABSORPTION: ICD-10-CM

## 2022-07-01 DIAGNOSIS — D50.0 IRON DEFICIENCY ANEMIA DUE TO CHRONIC BLOOD LOSS: Primary | ICD-10-CM

## 2022-07-01 DIAGNOSIS — D50.8 IRON DEFICIENCY ANEMIA SECONDARY TO INADEQUATE DIETARY IRON INTAKE: ICD-10-CM

## 2022-07-01 PROCEDURE — 96374 THER/PROPH/DIAG INJ IV PUSH: CPT

## 2022-07-01 PROCEDURE — 96372 THER/PROPH/DIAG INJ SC/IM: CPT

## 2022-07-01 RX ORDER — CYANOCOBALAMIN 1000 UG/ML
INJECTION INTRAMUSCULAR; SUBCUTANEOUS
Status: COMPLETED
Start: 2022-07-01 | End: 2022-07-01

## 2022-07-01 RX ORDER — CYANOCOBALAMIN 1000 UG/ML
1000 INJECTION INTRAMUSCULAR; SUBCUTANEOUS ONCE
Start: 2022-07-08 | End: 2022-07-08

## 2022-07-01 RX ORDER — CYANOCOBALAMIN 1000 UG/ML
1000 INJECTION INTRAMUSCULAR; SUBCUTANEOUS ONCE
Status: COMPLETED | OUTPATIENT
Start: 2022-07-01 | End: 2022-07-01

## 2022-07-01 RX ADMIN — CYANOCOBALAMIN 1000 MCG: 1000 INJECTION INTRAMUSCULAR; SUBCUTANEOUS at 16:15:00

## 2022-07-01 NOTE — PROGRESS NOTES
Patient arrives for monthly venofer and B12 injection. Arrives ambulating independently. B12 given in left deltoid, patient tolerated well. PIV established to [facility] - present blood return noted. Venofer given slowly over 2 minutes, patient tolerated well. Patient refused 30 min observation - has been tolerating iron without complications. PIV removed, site covered with gauze and coban. Discharged ambulating independently with future appointments scheduled. Pt is active on [facility].

## 2022-07-02 ENCOUNTER — APPOINTMENT (OUTPATIENT)
Dept: GENERAL RADIOLOGY | Facility: HOSPITAL | Age: 37
End: 2022-07-02
Attending: EMERGENCY MEDICINE
Payer: MEDICAID

## 2022-07-02 ENCOUNTER — HOSPITAL ENCOUNTER (EMERGENCY)
Facility: HOSPITAL | Age: 37
Discharge: HOME | End: 2022-07-03
Attending: EMERGENCY MEDICINE
Payer: MEDICAID

## 2022-07-02 DIAGNOSIS — S20.211A CONTUSION OF RIB ON RIGHT SIDE, INITIAL ENCOUNTER: Primary | ICD-10-CM

## 2022-07-02 DIAGNOSIS — S51.811A LACERATION OF RIGHT FOREARM, INITIAL ENCOUNTER: ICD-10-CM

## 2022-07-02 DIAGNOSIS — S50.11XA CONTUSION OF RIGHT FOREARM, INITIAL ENCOUNTER: ICD-10-CM

## 2022-07-02 PROCEDURE — 90471 IMMUNIZATION ADMIN: CPT

## 2022-07-02 PROCEDURE — 71101 X-RAY EXAM UNILAT RIBS/CHEST: CPT | Performed by: EMERGENCY MEDICINE

## 2022-07-02 PROCEDURE — 73090 X-RAY EXAM OF FOREARM: CPT | Performed by: EMERGENCY MEDICINE

## 2022-07-02 PROCEDURE — 99284 EMERGENCY DEPT VISIT MOD MDM: CPT

## 2022-07-03 VITALS
HEART RATE: 85 BPM | OXYGEN SATURATION: 98 % | WEIGHT: 154 LBS | DIASTOLIC BLOOD PRESSURE: 82 MMHG | RESPIRATION RATE: 18 BRPM | BODY MASS INDEX: 24 KG/M2 | TEMPERATURE: 98 F | SYSTOLIC BLOOD PRESSURE: 116 MMHG

## 2022-07-03 NOTE — ED INITIAL ASSESSMENT (HPI)
Patient states he fell from a ladder which was about 8 ft tall around 3pm today. States he is having pain to his right arm and right rib cage. Denies hitting head.

## 2022-07-12 ENCOUNTER — PATIENT OUTREACH (OUTPATIENT)
Dept: CASE MANAGEMENT | Age: 37
End: 2022-07-12

## 2022-07-12 NOTE — PROGRESS NOTES
1st attempt DM apt request & DM f/u questions    Dr Miley Galindo  Endocrinology Diabetes and Metabolism  [facility]  [address] [facility] [address] [phone]  Apt made: Thu 07/14 @1:45pm w/DEISI Bragg  Confirmed w/pt and DM questions answered  Closing encounter    Diabetic Outreach D/C Follow Up Questions:     1. Did you receive the information provided during your hospitalization and at discharge about diabetes?  no    If No:  Would you like us to mail you the information? yes   If Yes:  Was the information helpful? No     2. Were there any changes made to your medications? no            3. Do you have all of your diabetes pills and or insulin now that you are home? Yes  If yes:  do you understand the changes made? yes      4. Are you confident in managing your diabetes? yes     5. Did you make the necessary transportation arrangements to get to your diabetes follow-up appointment? Yes         If pt answers No to questions #3 and #4 Advise pt you will have a clinical person contact them to address.

## 2022-07-14 ENCOUNTER — OFFICE VISIT (OUTPATIENT)
Dept: ENDOCRINOLOGY CLINIC | Facility: CLINIC | Age: 37
End: 2022-07-14
Payer: MEDICAID

## 2022-07-14 VITALS
WEIGHT: 159.19 LBS | HEART RATE: 92 BPM | DIASTOLIC BLOOD PRESSURE: 71 MMHG | BODY MASS INDEX: 25 KG/M2 | SYSTOLIC BLOOD PRESSURE: 109 MMHG

## 2022-07-14 DIAGNOSIS — Z79.4 TYPE 2 DIABETES MELLITUS WITH OTHER SPECIFIED COMPLICATION, WITH LONG-TERM CURRENT USE OF INSULIN (HCC): Primary | ICD-10-CM

## 2022-07-14 DIAGNOSIS — E11.69 TYPE 2 DIABETES MELLITUS WITH OTHER SPECIFIED COMPLICATION, WITH LONG-TERM CURRENT USE OF INSULIN (HCC): Primary | ICD-10-CM

## 2022-07-14 LAB
CARTRIDGE LOT#: ABNORMAL NUMERIC
GLUCOSE BLOOD: 207
HEMOGLOBIN A1C: 8.5 % (ref 4.3–5.6)
TEST STRIP LOT #: NORMAL NUMERIC

## 2022-07-14 PROCEDURE — 83036 HEMOGLOBIN GLYCOSYLATED A1C: CPT | Performed by: NURSE PRACTITIONER

## 2022-07-14 PROCEDURE — 3078F DIAST BP <80 MM HG: CPT | Performed by: NURSE PRACTITIONER

## 2022-07-14 PROCEDURE — 3052F HG A1C>EQUAL 8.0%<EQUAL 9.0%: CPT | Performed by: NURSE PRACTITIONER

## 2022-07-14 PROCEDURE — 82947 ASSAY GLUCOSE BLOOD QUANT: CPT | Performed by: NURSE PRACTITIONER

## 2022-07-14 PROCEDURE — 36416 COLLJ CAPILLARY BLOOD SPEC: CPT | Performed by: NURSE PRACTITIONER

## 2022-07-14 PROCEDURE — 99214 OFFICE O/P EST MOD 30 MIN: CPT | Performed by: NURSE PRACTITIONER

## 2022-07-14 PROCEDURE — 3074F SYST BP LT 130 MM HG: CPT | Performed by: NURSE PRACTITIONER

## 2022-07-14 RX ORDER — FLASH GLUCOSE SENSOR
1 KIT MISCELLANEOUS
Qty: 2 EACH | Refills: 2 | Status: SHIPPED | OUTPATIENT
Start: 2022-07-14

## 2022-07-14 RX ORDER — INSULIN DETEMIR 100 [IU]/ML
8 INJECTION, SOLUTION SUBCUTANEOUS DAILY
Qty: 7 ML | Refills: 0 | Status: SHIPPED | OUTPATIENT
Start: 2022-07-14 | End: 2022-10-12

## 2022-07-14 NOTE — PATIENT INSTRUCTIONS
A1C: 8.5% today -->increased from 8.2% on 3/8/2022  Blood glucose: 207 in clinic today  Fax eye exam report to: [phone]- endocrinology [place] [facility]     Medications:   -continue with Glipizide 10mg twice daily   -continue with Metformin 1,000mg twice daily   -continue with Januvia 50mg once daily in AM  - start Levemir 8 units once nightly   -continue with Humalog 3 units with breakfast + sliding scale      3 units with lunch + sliding scale      3 units with dinner + sliding scale     -please call with an update on your blood glucose readings on Friday 7/15 after lunch meal     Weight:  Wt Readings from Last 6 Encounters:  07/02/22 : 154 lb (69.9 kg)  06/18/22 : 155 lb (70.3 kg)  05/25/22 : 154 lb (69.9 kg)  03/25/22 : 152 lb 3.2 oz (69 kg)  03/11/22 : 150 lb (68 kg)  03/08/22 : 150 lb (68 kg)    A1C goal:  <7.0%    Blood sugar testing:  Test your blood sugar 3 times daily   Recommended times to test: Before breakfast (fasting), before lunch, before dinner   Or start using Freestyle chely 2 continuous glucometer     Blood sugar targets:  Before breakfast:   (preferably < 110)  Before meals OR 2 hours after meals: <180 (preferably <150)     Call for persistent blood sugars < 75 or > 200

## 2022-07-19 ENCOUNTER — TELEPHONE (OUTPATIENT)
Dept: ENDOCRINOLOGY CLINIC | Facility: CLINIC | Age: 37
End: 2022-07-19

## 2022-07-19 NOTE — TELEPHONE ENCOUNTER
Please call patient to follow up on his BG readings. He was suppose to start on Prednison for 1 month on 7/15. His current medication regimen should be:    continue with Glipizide 10mg twice daily   -continue with Metformin 1,000mg twice daily   -continue with Januvia 50mg once daily in AM  - start Levemir 8 units once nightly   -continue with Humalog 3 units with breakfast + sliding scale                                             3 units with lunch + sliding scale                                             3 units with dinner + sliding scale     Thank you!

## 2022-07-20 NOTE — TELEPHONE ENCOUNTER
rn called patient , states he has not started prednisone yet . He will let us know when he starts prednisone.

## 2022-07-22 ENCOUNTER — APPOINTMENT (OUTPATIENT)
Dept: HEMATOLOGY/ONCOLOGY | Facility: HOSPITAL | Age: 37
End: 2022-07-22
Attending: INTERNAL MEDICINE
Payer: MEDICAID

## 2022-07-27 ENCOUNTER — TELEPHONE (OUTPATIENT)
Dept: ENDOCRINOLOGY CLINIC | Facility: CLINIC | Age: 37
End: 2022-07-27

## 2022-07-27 NOTE — TELEPHONE ENCOUNTER
Medication  CGM  pump supply Requested: Continuous Blood Gluc Sensor (FREESTYLE ENDY 2 SENSOR    Si each every 14 (fourteen) days    DX Code: E11.69                                       Case Number/Pending Ref#:

## 2022-07-27 NOTE — TELEPHONE ENCOUNTER
Continuous Blood Gluc Sensor (FREESTYLE ENDY 2 SENSOR) Does not apply Misc, 1 each every 14 (fourteen) days. , Disp: 2 each, Rfl: 2      Message:   Plan does not cover the medication. Please call plan at [phone] to initiate a prior authorization or call/fax pharmacy to change medication.  Patient ID is [id number]

## 2022-07-29 ENCOUNTER — TELEPHONE (OUTPATIENT)
Dept: HEMATOLOGY/ONCOLOGY | Facility: HOSPITAL | Age: 37
End: 2022-07-29

## 2022-07-29 NOTE — TELEPHONE ENCOUNTER
Called pt because he was late for 4 pm infusion appointment. Pt stated he did not realize he had an appointment today. Informed pt  will call him on Monday to reschedule his appointment, pt stated understanding.

## 2022-08-01 ENCOUNTER — TELEPHONE (OUTPATIENT)
Dept: HEMATOLOGY/ONCOLOGY | Facility: HOSPITAL | Age: 37
End: 2022-08-01

## 2022-08-01 NOTE — TELEPHONE ENCOUNTER
Called patient regarding miss appointment on 7-29 no answer left message to call back and schedule appointment that was missed

## 2022-08-02 NOTE — TELEPHONE ENCOUNTER
Called [facility] [phone], spoke with Brinda. She states Freestyle Joy 2 sensor was denied 7/29. States patient must have DM 1 or 2 and that must have one of 5 listed criteria-see below. Informed her the form I faxed over has diagnosis of Type 2 diabetes mellitus and that I had marked the 6th option of \"Failure to achieve glycemic goals\". She states that is not listed on her form. She will refax letter with denial rationale to [phone].    Call ref # [id number] [id number] 08/02/2022

## 2022-08-03 ENCOUNTER — TELEPHONE (OUTPATIENT)
Dept: HEMATOLOGY/ONCOLOGY | Facility: HOSPITAL | Age: 37
End: 2022-08-03

## 2022-08-09 ENCOUNTER — OFFICE VISIT (OUTPATIENT)
Dept: HEMATOLOGY/ONCOLOGY | Facility: HOSPITAL | Age: 37
End: 2022-08-09
Attending: INTERNAL MEDICINE
Payer: MEDICAID

## 2022-08-09 VITALS
RESPIRATION RATE: 16 BRPM | OXYGEN SATURATION: 98 % | HEART RATE: 93 BPM | SYSTOLIC BLOOD PRESSURE: 124 MMHG | TEMPERATURE: 99 F | DIASTOLIC BLOOD PRESSURE: 75 MMHG

## 2022-08-09 DIAGNOSIS — D50.8 IRON DEFICIENCY ANEMIA SECONDARY TO INADEQUATE DIETARY IRON INTAKE: ICD-10-CM

## 2022-08-09 DIAGNOSIS — D50.0 IRON DEFICIENCY ANEMIA DUE TO CHRONIC BLOOD LOSS: Primary | ICD-10-CM

## 2022-08-09 DIAGNOSIS — K90.89 OTHER SPECIFIED INTESTINAL MALABSORPTION: ICD-10-CM

## 2022-08-09 LAB
BASOPHILS # BLD AUTO: 0.06 X10(3) UL (ref 0–0.2)
BASOPHILS NFR BLD AUTO: 0.9 %
DEPRECATED HBV CORE AB SER IA-ACNC: 334 NG/ML
DEPRECATED RDW RBC AUTO: 35.4 FL (ref 35.1–46.3)
EOSINOPHIL # BLD AUTO: 0.17 X10(3) UL (ref 0–0.7)
EOSINOPHIL NFR BLD AUTO: 2.6 %
ERYTHROCYTE [DISTWIDTH] IN BLOOD BY AUTOMATED COUNT: 12.3 % (ref 11–15)
HCT VFR BLD AUTO: 40.6 %
HGB BLD-MCNC: 13.4 G/DL
IMM GRANULOCYTES # BLD AUTO: 0.11 X10(3) UL (ref 0–1)
IMM GRANULOCYTES NFR BLD: 1.7 %
IRON SATN MFR SERPL: 17 %
IRON SERPL-MCNC: 46 UG/DL
LYMPHOCYTES # BLD AUTO: 2.09 X10(3) UL (ref 1–4)
LYMPHOCYTES NFR BLD AUTO: 32.3 %
MCH RBC QN AUTO: 26.3 PG (ref 26–34)
MCHC RBC AUTO-ENTMCNC: 33 G/DL (ref 31–37)
MCV RBC AUTO: 79.8 FL
MONOCYTES # BLD AUTO: 0.71 X10(3) UL (ref 0.1–1)
MONOCYTES NFR BLD AUTO: 11 %
NEUTROPHILS # BLD AUTO: 3.34 X10 (3) UL (ref 1.5–7.7)
NEUTROPHILS # BLD AUTO: 3.34 X10(3) UL (ref 1.5–7.7)
NEUTROPHILS NFR BLD AUTO: 51.5 %
PLATELET # BLD AUTO: 286 10(3)UL (ref 150–450)
RBC # BLD AUTO: 5.09 X10(6)UL
TIBC SERPL-MCNC: 270 UG/DL (ref 240–450)
TRANSFERRIN SERPL-MCNC: 181 MG/DL (ref 200–360)
WBC # BLD AUTO: 6.5 X10(3) UL (ref 4–11)

## 2022-08-09 PROCEDURE — 83540 ASSAY OF IRON: CPT

## 2022-08-09 PROCEDURE — 82728 ASSAY OF FERRITIN: CPT

## 2022-08-09 PROCEDURE — 84466 ASSAY OF TRANSFERRIN: CPT

## 2022-08-09 PROCEDURE — 96374 THER/PROPH/DIAG INJ IV PUSH: CPT

## 2022-08-09 PROCEDURE — 96372 THER/PROPH/DIAG INJ SC/IM: CPT

## 2022-08-09 PROCEDURE — 85025 COMPLETE CBC W/AUTO DIFF WBC: CPT

## 2022-08-09 RX ORDER — CYANOCOBALAMIN 1000 UG/ML
1000 INJECTION INTRAMUSCULAR; SUBCUTANEOUS ONCE
Start: 2022-08-23 | End: 2022-08-23

## 2022-08-09 RX ORDER — CYANOCOBALAMIN 1000 UG/ML
INJECTION INTRAMUSCULAR; SUBCUTANEOUS
Status: COMPLETED
Start: 2022-08-09 | End: 2022-08-09

## 2022-08-09 RX ORDER — CYANOCOBALAMIN 1000 UG/ML
1000 INJECTION INTRAMUSCULAR; SUBCUTANEOUS ONCE
Status: COMPLETED | OUTPATIENT
Start: 2022-08-09 | End: 2022-08-09

## 2022-08-09 RX ADMIN — CYANOCOBALAMIN 1000 MCG: 1000 INJECTION INTRAMUSCULAR; SUBCUTANEOUS at 16:22:00

## 2022-08-09 NOTE — PROGRESS NOTES
Patient arrives for monthly venofer and B12 injection. Arrives ambulating independently. B12 given in left deltoid, patient tolerated well. PIV established to R AC-labs drawn as ordered - present blood return noted. Venofer given slowly over 5 minutes, patient tolerated well. Patient refused 30 min observation - has been tolerating iron without complications. PIV removed, site covered with gauze and coban. Discharged ambulating independently with future appointments scheduled. Pt is active on [employer].

## 2022-08-17 ENCOUNTER — OFFICE VISIT (OUTPATIENT)
Dept: HEMATOLOGY/ONCOLOGY | Facility: HOSPITAL | Age: 37
End: 2022-08-17
Attending: INTERNAL MEDICINE
Payer: MEDICAID

## 2022-08-17 VITALS
TEMPERATURE: 99 F | OXYGEN SATURATION: 100 % | SYSTOLIC BLOOD PRESSURE: 118 MMHG | WEIGHT: 162 LBS | DIASTOLIC BLOOD PRESSURE: 74 MMHG | HEART RATE: 103 BPM | HEIGHT: 67 IN | BODY MASS INDEX: 25.43 KG/M2 | RESPIRATION RATE: 16 BRPM

## 2022-08-17 DIAGNOSIS — D50.0 IRON DEFICIENCY ANEMIA DUE TO CHRONIC BLOOD LOSS: Primary | ICD-10-CM

## 2022-08-17 DIAGNOSIS — Z51.81 MEDICATION MONITORING ENCOUNTER: ICD-10-CM

## 2022-08-17 DIAGNOSIS — E53.8 B12 DEFICIENCY: ICD-10-CM

## 2022-08-17 DIAGNOSIS — K90.89 OTHER SPECIFIED INTESTINAL MALABSORPTION: ICD-10-CM

## 2022-08-17 PROCEDURE — 99214 OFFICE O/P EST MOD 30 MIN: CPT | Performed by: INTERNAL MEDICINE

## 2022-08-19 ENCOUNTER — APPOINTMENT (OUTPATIENT)
Dept: HEMATOLOGY/ONCOLOGY | Facility: HOSPITAL | Age: 37
End: 2022-08-19
Attending: INTERNAL MEDICINE
Payer: MEDICAID

## 2022-08-23 NOTE — TELEPHONE ENCOUNTER
Received denial letter from Eleanor Michel. See message below, states why pt got denied from insurance for the [facility] 2. Fax placed back in provider folder to not lose fax. Routed to provider for further instruction.

## 2022-08-23 NOTE — TELEPHONE ENCOUNTER
[id number] Terri Sales noted. On the question #10 (on attached a screen shot) the following box should have been checked:     X failure to achieve glycemic goals       Please reprocess PA. Thank you!

## 2022-08-26 ENCOUNTER — APPOINTMENT (OUTPATIENT)
Dept: HEMATOLOGY/ONCOLOGY | Facility: HOSPITAL | Age: 37
End: 2022-08-26
Attending: INTERNAL MEDICINE
Payer: MEDICAID

## 2022-08-26 NOTE — TELEPHONE ENCOUNTER
Received approval determination for Joy sensor until 8/24/2023  Notified pt via [address]    Approval sent to scanning

## 2022-08-26 NOTE — TELEPHONE ENCOUNTER
Denial determination dated 7/29/2022 sent to scanning.  Still awaiting PA determination that was sent on 8/24/2022

## 2022-09-15 ENCOUNTER — OFFICE VISIT (OUTPATIENT)
Dept: ENDOCRINOLOGY CLINIC | Facility: CLINIC | Age: 37
End: 2022-09-15
Payer: MEDICAID

## 2022-09-15 VITALS
BODY MASS INDEX: 25 KG/M2 | DIASTOLIC BLOOD PRESSURE: 82 MMHG | HEART RATE: 99 BPM | WEIGHT: 162 LBS | SYSTOLIC BLOOD PRESSURE: 129 MMHG

## 2022-09-15 DIAGNOSIS — E11.65 TYPE 2 DIABETES MELLITUS WITH HYPERGLYCEMIA, WITH LONG-TERM CURRENT USE OF INSULIN (HCC): Primary | ICD-10-CM

## 2022-09-15 DIAGNOSIS — Z79.4 TYPE 2 DIABETES MELLITUS WITH HYPERGLYCEMIA, WITH LONG-TERM CURRENT USE OF INSULIN (HCC): Primary | ICD-10-CM

## 2022-09-15 LAB
CARTRIDGE LOT#: NORMAL NUMERIC
GLUCOSE BLOOD: 230
TEST STRIP LOT #: NORMAL NUMERIC

## 2022-09-15 PROCEDURE — 99214 OFFICE O/P EST MOD 30 MIN: CPT | Performed by: NURSE PRACTITIONER

## 2022-09-15 PROCEDURE — 3044F HG A1C LEVEL LT 7.0%: CPT | Performed by: NURSE PRACTITIONER

## 2022-09-15 PROCEDURE — 83036 HEMOGLOBIN GLYCOSYLATED A1C: CPT | Performed by: NURSE PRACTITIONER

## 2022-09-15 PROCEDURE — 3074F SYST BP LT 130 MM HG: CPT | Performed by: NURSE PRACTITIONER

## 2022-09-15 PROCEDURE — 82947 ASSAY GLUCOSE BLOOD QUANT: CPT | Performed by: NURSE PRACTITIONER

## 2022-09-15 PROCEDURE — 3079F DIAST BP 80-89 MM HG: CPT | Performed by: NURSE PRACTITIONER

## 2022-09-16 ENCOUNTER — NURSE ONLY (OUTPATIENT)
Dept: ENDOCRINOLOGY CLINIC | Facility: CLINIC | Age: 37
End: 2022-09-16
Payer: MEDICAID

## 2022-09-16 DIAGNOSIS — Z79.4 TYPE 2 DIABETES MELLITUS WITH HYPERGLYCEMIA, WITH LONG-TERM CURRENT USE OF INSULIN (HCC): Primary | ICD-10-CM

## 2022-09-16 DIAGNOSIS — E11.65 TYPE 2 DIABETES MELLITUS WITH HYPERGLYCEMIA, WITH LONG-TERM CURRENT USE OF INSULIN (HCC): Primary | ICD-10-CM

## 2022-09-16 NOTE — PROGRESS NOTES
Patient arrived at clinic for [facility] application - patient brought sensor  Libre2 sensor placed on patient's left upper arm - patient tolerated well - patient stated understanding on process of applying sensor  Patient connected to Night Out/clinic via phone  Patient advised to call clinic with questions and if he would like to come to clinic for next application so that RN can observe

## 2022-09-20 RX ORDER — INSULIN DETEMIR 100 [IU]/ML
INJECTION, SOLUTION SUBCUTANEOUS
Qty: 6 ML | Refills: 0 | Status: SHIPPED | OUTPATIENT
Start: 2022-09-20

## 2022-09-22 NOTE — PROGRESS NOTES
Continuous Glucose Monitor Download    Start Time: 1:45pm  End Time: 2:40pm    Indication: Pt is T2D followed by Lavaun Boas. At last appointment MI 9/15/2022, advised to restart taking Levemir insulin and hold off on Humalog. He restarted Libre2 9/16/2022 with his phone. Appointment today for download and review nutrition. Nutrition is complicate by Crohns Disease. A1c: 8.5% (9/15/2022)    Current Diabetes Medication:  Levemir 8 units AM  Glipizide 10mg BID  Metformin 1000mg BID  Januvia 50mg daily    Nutrition Intake:  Breakfast: 1-2 (Grenadian flatbread) pieces of bread with gravy/veggies/meat; rice (singh based)  Lunch: Similar to breakfast; sometimes a few spoonfuls of yogurt with spices  Dinner: Similar to breakfast  Snack: Bread; not a lot  Beverages: Water; 0 sugar coke/pepsi; plain tea  ** appears pt is consuming 2-3 cups of rice  ** cant have raw veggies  ** cant have lettuce anymore  ** unsure about fruits  ** large amounts of what upsetting to the stomach    Sensor Type: Libre2 via phone    Reviewed CGMS download and patient logs:  Days of sensor use: 14 days  Average glucose (mg/dL): 212 mg/dl  Standard deviation =/- 26.5  Estimated GMI: 8.4%  Target Ranges:  mg/dL          30% of time in range  0% of time below range  70% of time above range      Interpretation:  - Diet high in carbohydrates complicated by Crohns  - Rise in blood sugar occurring around 4am  - Post prandial hyperglycemia without 4 units of insulin at breakfast and dinner  - Lunch post prandial varies due to size of lunch   - Pt now compliant with Humalog dosing as mother is administering it  Nutrition Topics Discussed   [x] Discussed healthy weight management, glucose management, lipid management, and BP management as related to diabetes   [x] Discussed basic meal planning guidelines for diabetes regular mealtime, limited concentrated sweets.  Worked on establishing eating pattern/timing of meals and snacks    [x] Discussed in depth meal planning using the healthy eating with diabetes plate method with focus on balanced macronutrient consumption, including identifying foods that are carbohydrates, lean protein, non-starchy vegetables, and heart healthy fats   [x] Stressed importance of carbohydrate consistency in each meal   [x] Recommended carbohydrate targets of <45 grams at meals and <15-20 grams at snacks   [x] Educated on label reading   [x] Educated on portion control; including use of measuring cups, spoons, or food scale   [x] Provided suggestions for lower carb, healthy snacks   [x] Educated on importance of food monitoring and how to use food logs   [x] Discussed how to handle special occasions, dining out, and eating on the go (Crohns)   [x] Discussed menu planning, healthy food shopping, cooking tips, and need to pre prepare foods    [x] Emphasized the need for small, sustainable changes and working on [employer] goals to encourage sustainable behaviors    [x] Discussed barriers and overcoming barriers to best achieve meal planning goals       Recommended medication changes/Plan:  - Increase Levemir to 10 units  - Continue with Humalog 4 units at breakfast and dinner  - Will send message to [facility] about adding in lunch dose as it was advised previously to hold off on all Humalog dose. As some days, he is eating a smaller lunch but feels overall he is now consuming a larger lunch.  Report placed on AnushkaNaviswisss desk  - Nutrition   --> Can avoid raw veggies but can have steam/cooked   --> Try spinach/kale in replace of salads with lettuce   --> Focus on high protein/low carbohydrate   --> Fist size for rice   --> 1 flat bread   --> Handout provided for healthy snacks to see if any options could work for a smaller lunch   --> Snacks such as cheese, meat, almonds, small amount of berries seem agreeable with Crohn's management  - Encouraged pt to keep a food log especially with how he feels with certain foods so we can determine a healthy/blood sugar management meal plan    Updated Diabetes Medication:  Levemir 10 units AM  Humalog 4 units with breakfast and dinner  Glipizide 10mg BID  Metformin 1000mg BID  Januvia 50mg daily    Follow up:  10/27/2022 Arpita

## 2022-09-23 ENCOUNTER — NURSE ONLY (OUTPATIENT)
Dept: ENDOCRINOLOGY CLINIC | Facility: CLINIC | Age: 37
End: 2022-09-23

## 2022-09-23 ENCOUNTER — OFFICE VISIT (OUTPATIENT)
Dept: HEMATOLOGY/ONCOLOGY | Facility: HOSPITAL | Age: 37
End: 2022-09-23
Attending: INTERNAL MEDICINE

## 2022-09-23 ENCOUNTER — TELEPHONE (OUTPATIENT)
Dept: ENDOCRINOLOGY CLINIC | Facility: CLINIC | Age: 37
End: 2022-09-23

## 2022-09-23 VITALS
HEART RATE: 87 BPM | SYSTOLIC BLOOD PRESSURE: 119 MMHG | RESPIRATION RATE: 18 BRPM | TEMPERATURE: 98 F | DIASTOLIC BLOOD PRESSURE: 75 MMHG | OXYGEN SATURATION: 100 %

## 2022-09-23 DIAGNOSIS — D50.8 IRON DEFICIENCY ANEMIA SECONDARY TO INADEQUATE DIETARY IRON INTAKE: ICD-10-CM

## 2022-09-23 DIAGNOSIS — Z79.4 TYPE 2 DIABETES MELLITUS WITH HYPERGLYCEMIA, WITH LONG-TERM CURRENT USE OF INSULIN (HCC): Primary | ICD-10-CM

## 2022-09-23 DIAGNOSIS — E11.65 TYPE 2 DIABETES MELLITUS WITH HYPERGLYCEMIA, WITH LONG-TERM CURRENT USE OF INSULIN (HCC): Primary | ICD-10-CM

## 2022-09-23 DIAGNOSIS — K90.89 OTHER SPECIFIED INTESTINAL MALABSORPTION: ICD-10-CM

## 2022-09-23 DIAGNOSIS — D50.0 IRON DEFICIENCY ANEMIA DUE TO CHRONIC BLOOD LOSS: Primary | ICD-10-CM

## 2022-09-23 PROCEDURE — 96372 THER/PROPH/DIAG INJ SC/IM: CPT

## 2022-09-23 PROCEDURE — 96374 THER/PROPH/DIAG INJ IV PUSH: CPT

## 2022-09-23 RX ORDER — CYANOCOBALAMIN 1000 UG/ML
INJECTION INTRAMUSCULAR; SUBCUTANEOUS
Status: COMPLETED
Start: 2022-09-23 | End: 2022-09-23

## 2022-09-23 RX ORDER — CYANOCOBALAMIN 1000 UG/ML
1000 INJECTION INTRAMUSCULAR; SUBCUTANEOUS ONCE
Status: COMPLETED | OUTPATIENT
Start: 2022-09-23 | End: 2022-09-23

## 2022-09-23 RX ORDER — CYANOCOBALAMIN 1000 UG/ML
1000 INJECTION INTRAMUSCULAR; SUBCUTANEOUS ONCE
Start: 2022-09-30 | End: 2022-09-30

## 2022-09-23 RX ADMIN — CYANOCOBALAMIN 1000 MCG: 1000 INJECTION INTRAMUSCULAR; SUBCUTANEOUS at 15:06:00

## 2022-09-23 NOTE — PROGRESS NOTES
Pt arrived for venofer infusion and Vitamin B12 injection. Offers no complaints. States tolerating previous infusions well. Declined post infusion observation. Appeared to tolerate treatment, no s/s of rxn noted. Discharged home ambulating independently.   Lab Results   Component Value Date    HGB 13.4 08/09/2022    HCT 40.6 08/09/2022    MARJORIE 334.0 08/09/2022    SAT 17 (L) 08/09/2022

## 2022-09-23 NOTE — TELEPHONE ENCOUNTER
Met with pt today for [facility] appt. Pt is wondering if he should start taking humalog insulin at lunch. Advised would route a message to [facility] as she had previously advised to hold off on all humalog insulin dosing. Reports placed on desk for review. Pt states [facility] message can be sent with instructions.     Current Regimen:  Levemir 10 units AM  Humalog 4 units at breakfast and dinner  Glipizide 10mg BID  Metformin 1000mg BID  Januvia 50mg daily    Using [employer] via phone and connected to clinic

## 2022-09-26 NOTE — TELEPHONE ENCOUNTER
Remedios Solano,    At appt, pt stated he was giving 4 units of Humalog at breakfast and at dinner (his mom is administering). I shantell messaged back to confirm dosing as I see in his libreview that he administered 3 units on Sunday too (which worked perfectly!).

## 2022-09-26 NOTE — TELEPHONE ENCOUNTER
[id number] Terri Sales noted. BG readings reviewed and it seems that he is continuing to have variable BG readings and likely this is due to him administering meal time insulin. I see that he administered 3 units with breakfast meal Sunday. Please clarify if he is injecting humalog insulin and if so, what dose and how often.      Thank you

## 2022-11-21 ENCOUNTER — TELEPHONE (OUTPATIENT)
Dept: GASTROENTEROLOGY | Facility: CLINIC | Age: 37
End: 2022-11-21

## 2022-11-21 RX ORDER — INSULIN DETEMIR 100 [IU]/ML
INJECTION, SOLUTION SUBCUTANEOUS
Qty: 6 ML | Refills: 0 | Status: CANCELLED | OUTPATIENT
Start: 2022-11-21

## 2022-11-21 RX ORDER — INSULIN LISPRO 100 [IU]/ML
INJECTION, SOLUTION INTRAVENOUS; SUBCUTANEOUS
Qty: 15 ML | Refills: 0 | Status: SHIPPED | OUTPATIENT
Start: 2022-11-21

## 2022-11-21 RX ORDER — INSULIN DETEMIR 100 [IU]/ML
8 INJECTION, SOLUTION SUBCUTANEOUS DAILY
Qty: 6 ML | Refills: 0 | Status: SHIPPED | OUTPATIENT
Start: 2022-11-21

## 2022-11-21 NOTE — TELEPHONE ENCOUNTER
LOV:9/15  RTC: 6 weeks (10/27)    Scheduled for appointment 11/22 at 0930     Patient requesting Rx of humalog and levemir    Rx sent to [facility] pharmacy.

## 2022-11-22 ENCOUNTER — OFFICE VISIT (OUTPATIENT)
Dept: ENDOCRINOLOGY CLINIC | Facility: CLINIC | Age: 37
End: 2022-11-22
Payer: MEDICAID

## 2022-11-22 VITALS
HEART RATE: 108 BPM | SYSTOLIC BLOOD PRESSURE: 106 MMHG | DIASTOLIC BLOOD PRESSURE: 74 MMHG | WEIGHT: 160 LBS | BODY MASS INDEX: 25 KG/M2

## 2022-11-22 DIAGNOSIS — Z79.4 TYPE 2 DIABETES MELLITUS WITH HYPERGLYCEMIA, WITH LONG-TERM CURRENT USE OF INSULIN (HCC): Primary | ICD-10-CM

## 2022-11-22 DIAGNOSIS — E11.65 TYPE 2 DIABETES MELLITUS WITH HYPERGLYCEMIA, WITH LONG-TERM CURRENT USE OF INSULIN (HCC): Primary | ICD-10-CM

## 2022-11-22 LAB
CARTRIDGE LOT#: NORMAL NUMERIC
GLUCOSE BLOOD: 221
TEST STRIP LOT #: NORMAL NUMERIC

## 2022-11-22 PROCEDURE — 3044F HG A1C LEVEL LT 7.0%: CPT | Performed by: NURSE PRACTITIONER

## 2022-11-22 PROCEDURE — 82947 ASSAY GLUCOSE BLOOD QUANT: CPT | Performed by: NURSE PRACTITIONER

## 2022-11-22 PROCEDURE — 3074F SYST BP LT 130 MM HG: CPT | Performed by: NURSE PRACTITIONER

## 2022-11-22 PROCEDURE — 99214 OFFICE O/P EST MOD 30 MIN: CPT | Performed by: NURSE PRACTITIONER

## 2022-11-22 PROCEDURE — 83036 HEMOGLOBIN GLYCOSYLATED A1C: CPT | Performed by: NURSE PRACTITIONER

## 2022-11-22 PROCEDURE — 3078F DIAST BP <80 MM HG: CPT | Performed by: NURSE PRACTITIONER

## 2022-11-22 NOTE — PATIENT INSTRUCTIONS
A1C: 8.7% today --> increased from 8.5% on 9/15/2022  Blood glucose: 221 in clinic today    Medications:   -continue with Glipizide 10mg twice daily   -continue with Metformin 1,000mg twice daily   -continue with Januvia 50mg once daily in AM  - Increase Levemir 10 --> 14 units once daily in AM   -start taking Humalog more consistently   5 units with breakfast     5 units with lunch    5 units with dinner     Weight:  Wt Readings from Last 6 Encounters:  11/22/22 : 160 lb (72.6 kg)  09/15/22 : 162 lb (73.5 kg)  08/17/22 : 162 lb (73.5 kg)  07/14/22 : 159 lb 3.2 oz (72.2 kg)  07/02/22 : 154 lb (69.9 kg)  06/18/22 : 155 lb (70.3 kg)    A1C goal:  <7.0%    Blood sugar testing:  Start using Freestyle chely 2 continuous glucometer     Blood sugar targets:  Before breakfast:  (preferably < 110)  Before meals OR 2 hours after meals: <150    Call for persistent blood sugars < 75 or > 200

## 2022-11-25 ENCOUNTER — APPOINTMENT (OUTPATIENT)
Dept: CT IMAGING | Facility: HOSPITAL | Age: 37
End: 2022-11-25
Attending: EMERGENCY MEDICINE
Payer: MEDICAID

## 2022-11-25 ENCOUNTER — HOSPITAL ENCOUNTER (INPATIENT)
Facility: HOSPITAL | Age: 37
LOS: 3 days | Discharge: HOME | End: 2022-11-28
Attending: EMERGENCY MEDICINE | Admitting: HOSPITALIST
Payer: MEDICAID

## 2022-11-25 DIAGNOSIS — K50.90 CROHN'S DISEASE WITHOUT COMPLICATION, UNSPECIFIED GASTROINTESTINAL TRACT LOCATION (HCC): Primary | ICD-10-CM

## 2022-11-25 LAB
ANION GAP SERPL CALC-SCNC: 6 MMOL/L (ref 0–18)
BASOPHILS # BLD AUTO: 0.03 X10(3) UL (ref 0–0.2)
BASOPHILS NFR BLD AUTO: 0.4 %
BUN BLD-MCNC: 8 MG/DL (ref 7–18)
BUN/CREAT SERPL: 10 (ref 10–20)
CALCIUM BLD-MCNC: 8.2 MG/DL (ref 8.5–10.1)
CHLORIDE SERPL-SCNC: 103 MMOL/L (ref 98–112)
CO2 SERPL-SCNC: 28 MMOL/L (ref 21–32)
CREAT BLD-MCNC: 0.8 MG/DL
DEPRECATED RDW RBC AUTO: 35.4 FL (ref 35.1–46.3)
EOSINOPHIL # BLD AUTO: 0.17 X10(3) UL (ref 0–0.7)
EOSINOPHIL NFR BLD AUTO: 2.5 %
ERYTHROCYTE [DISTWIDTH] IN BLOOD BY AUTOMATED COUNT: 12.5 % (ref 11–15)
GFR SERPLBLD BASED ON 1.73 SQ M-ARVRAT: 118 ML/MIN/1.73M2 (ref 60–?)
GLUCOSE BLD-MCNC: 247 MG/DL (ref 70–99)
GLUCOSE BLDC GLUCOMTR-MCNC: 177 MG/DL (ref 70–99)
HCT VFR BLD AUTO: 43.7 %
HGB BLD-MCNC: 13.7 G/DL
IMM GRANULOCYTES # BLD AUTO: 0.04 X10(3) UL (ref 0–1)
IMM GRANULOCYTES NFR BLD: 0.6 %
LYMPHOCYTES # BLD AUTO: 1.01 X10(3) UL (ref 1–4)
LYMPHOCYTES NFR BLD AUTO: 14.9 %
MCH RBC QN AUTO: 24.4 PG (ref 26–34)
MCHC RBC AUTO-ENTMCNC: 31.4 G/DL (ref 31–37)
MCV RBC AUTO: 77.9 FL
MONOCYTES # BLD AUTO: 0.47 X10(3) UL (ref 0.1–1)
MONOCYTES NFR BLD AUTO: 6.9 %
NEUTROPHILS # BLD AUTO: 5.06 X10 (3) UL (ref 1.5–7.7)
NEUTROPHILS # BLD AUTO: 5.06 X10(3) UL (ref 1.5–7.7)
NEUTROPHILS NFR BLD AUTO: 74.7 %
OSMOLALITY SERPL CALC.SUM OF ELEC: 291 MOSM/KG (ref 275–295)
PLATELET # BLD AUTO: 273 10(3)UL (ref 150–450)
POTASSIUM SERPL-SCNC: 3.8 MMOL/L (ref 3.5–5.1)
RBC # BLD AUTO: 5.61 X10(6)UL
SARS-COV-2 RNA RESP QL NAA+PROBE: NOT DETECTED
SODIUM SERPL-SCNC: 137 MMOL/L (ref 136–145)
WBC # BLD AUTO: 6.8 X10(3) UL (ref 4–11)

## 2022-11-25 PROCEDURE — 74177 CT ABD & PELVIS W/CONTRAST: CPT | Performed by: EMERGENCY MEDICINE

## 2022-11-25 PROCEDURE — 99222 1ST HOSP IP/OBS MODERATE 55: CPT | Performed by: HOSPITALIST

## 2022-11-25 RX ORDER — PROCHLORPERAZINE EDISYLATE 5 MG/ML
5 INJECTION INTRAMUSCULAR; INTRAVENOUS EVERY 8 HOURS PRN
Status: DISCONTINUED | OUTPATIENT
Start: 2022-11-25 | End: 2022-11-28

## 2022-11-25 RX ORDER — ONDANSETRON 2 MG/ML
4 INJECTION INTRAMUSCULAR; INTRAVENOUS EVERY 6 HOURS PRN
Status: DISCONTINUED | OUTPATIENT
Start: 2022-11-25 | End: 2022-11-28

## 2022-11-25 RX ORDER — ONDANSETRON 2 MG/ML
4 INJECTION INTRAMUSCULAR; INTRAVENOUS ONCE
Status: COMPLETED | OUTPATIENT
Start: 2022-11-25 | End: 2022-11-25

## 2022-11-25 RX ORDER — INSULIN DETEMIR 100 [IU]/ML
14 INJECTION, SOLUTION SUBCUTANEOUS DAILY
Qty: 15 ML | Refills: 0 | Status: SHIPPED | OUTPATIENT
Start: 2022-11-25

## 2022-11-25 RX ORDER — HYDROMORPHONE HYDROCHLORIDE 1 MG/ML
0.5 INJECTION, SOLUTION INTRAMUSCULAR; INTRAVENOUS; SUBCUTANEOUS EVERY 4 HOURS PRN
Status: DISCONTINUED | OUTPATIENT
Start: 2022-11-25 | End: 2022-11-28

## 2022-11-25 RX ORDER — NICOTINE POLACRILEX 4 MG
30 LOZENGE BUCCAL
Status: DISCONTINUED | OUTPATIENT
Start: 2022-11-25 | End: 2022-11-28

## 2022-11-25 RX ORDER — SODIUM CHLORIDE 9 MG/ML
INJECTION, SOLUTION INTRAVENOUS CONTINUOUS
Status: DISCONTINUED | OUTPATIENT
Start: 2022-11-25 | End: 2022-11-28

## 2022-11-25 RX ORDER — DEXTROSE MONOHYDRATE 25 G/50ML
50 INJECTION, SOLUTION INTRAVENOUS
Status: DISCONTINUED | OUTPATIENT
Start: 2022-11-25 | End: 2022-11-28

## 2022-11-25 RX ORDER — HEPARIN SODIUM 5000 [USP'U]/ML
5000 INJECTION, SOLUTION INTRAVENOUS; SUBCUTANEOUS EVERY 12 HOURS SCHEDULED
Status: DISCONTINUED | OUTPATIENT
Start: 2022-11-25 | End: 2022-11-28

## 2022-11-25 RX ORDER — MORPHINE SULFATE 4 MG/ML
4 INJECTION, SOLUTION INTRAMUSCULAR; INTRAVENOUS ONCE
Status: COMPLETED | OUTPATIENT
Start: 2022-11-25 | End: 2022-11-25

## 2022-11-25 RX ORDER — HYDROMORPHONE HYDROCHLORIDE 1 MG/ML
0.3 INJECTION, SOLUTION INTRAMUSCULAR; INTRAVENOUS; SUBCUTANEOUS EVERY 4 HOURS PRN
Status: DISCONTINUED | OUTPATIENT
Start: 2022-11-25 | End: 2022-11-28

## 2022-11-25 RX ORDER — NICOTINE POLACRILEX 4 MG
15 LOZENGE BUCCAL
Status: DISCONTINUED | OUTPATIENT
Start: 2022-11-25 | End: 2022-11-28

## 2022-11-25 NOTE — ED INITIAL ASSESSMENT (HPI)
Pt to ED via ems /w c/o lower abd pain and nausea that started today. Pt has hx of crohn's. Denies cough,fevers, daughter at home sick with influenza. Paramedics gave Zofran, 500ML bolus of 0.9 and 50 mcgs of fentanyl. Pt is axox4. 10/10 pain.

## 2022-11-26 LAB
ANION GAP SERPL CALC-SCNC: 5 MMOL/L (ref 0–18)
BASOPHILS # BLD AUTO: 0.01 X10(3) UL (ref 0–0.2)
BASOPHILS NFR BLD AUTO: 0.2 %
BUN BLD-MCNC: 7 MG/DL (ref 7–18)
BUN/CREAT SERPL: 8.9 (ref 10–20)
C DIFF TOX B STL QL: NEGATIVE
CALCIUM BLD-MCNC: 8 MG/DL (ref 8.5–10.1)
CHLORIDE SERPL-SCNC: 103 MMOL/L (ref 98–112)
CO2 SERPL-SCNC: 28 MMOL/L (ref 21–32)
CREAT BLD-MCNC: 0.79 MG/DL
CRP SERPL-MCNC: 6.4 MG/DL (ref ?–0.3)
DEPRECATED RDW RBC AUTO: 35.4 FL (ref 35.1–46.3)
EOSINOPHIL # BLD AUTO: 0.05 X10(3) UL (ref 0–0.7)
EOSINOPHIL NFR BLD AUTO: 0.8 %
ERYTHROCYTE [DISTWIDTH] IN BLOOD BY AUTOMATED COUNT: 12.6 % (ref 11–15)
ERYTHROCYTE [SEDIMENTATION RATE] IN BLOOD: 62 MM/HR
GFR SERPLBLD BASED ON 1.73 SQ M-ARVRAT: 118 ML/MIN/1.73M2 (ref 60–?)
GLUCOSE BLD-MCNC: 233 MG/DL (ref 70–99)
GLUCOSE BLDC GLUCOMTR-MCNC: 185 MG/DL (ref 70–99)
GLUCOSE BLDC GLUCOMTR-MCNC: 193 MG/DL (ref 70–99)
GLUCOSE BLDC GLUCOMTR-MCNC: 234 MG/DL (ref 70–99)
GLUCOSE BLDC GLUCOMTR-MCNC: 234 MG/DL (ref 70–99)
HCT VFR BLD AUTO: 40.9 %
HGB BLD-MCNC: 12.9 G/DL
IMM GRANULOCYTES # BLD AUTO: 0.03 X10(3) UL (ref 0–1)
IMM GRANULOCYTES NFR BLD: 0.5 %
LYMPHOCYTES # BLD AUTO: 0.95 X10(3) UL (ref 1–4)
LYMPHOCYTES NFR BLD AUTO: 15.3 %
MAGNESIUM SERPL-MCNC: 1.9 MG/DL (ref 1.6–2.6)
MCH RBC QN AUTO: 24.7 PG (ref 26–34)
MCHC RBC AUTO-ENTMCNC: 31.5 G/DL (ref 31–37)
MCV RBC AUTO: 78.4 FL
MONOCYTES # BLD AUTO: 0.16 X10(3) UL (ref 0.1–1)
MONOCYTES NFR BLD AUTO: 2.6 %
NEUTROPHILS # BLD AUTO: 5.01 X10 (3) UL (ref 1.5–7.7)
NEUTROPHILS # BLD AUTO: 5.01 X10(3) UL (ref 1.5–7.7)
NEUTROPHILS NFR BLD AUTO: 80.6 %
OSMOLALITY SERPL CALC.SUM OF ELEC: 287 MOSM/KG (ref 275–295)
PHOSPHATE SERPL-MCNC: 2.4 MG/DL (ref 2.5–4.9)
PLATELET # BLD AUTO: 221 10(3)UL (ref 150–450)
POTASSIUM SERPL-SCNC: 4.6 MMOL/L (ref 3.5–5.1)
RBC # BLD AUTO: 5.22 X10(6)UL
SODIUM SERPL-SCNC: 136 MMOL/L (ref 136–145)
WBC # BLD AUTO: 6.2 X10(3) UL (ref 4–11)

## 2022-11-26 PROCEDURE — 99233 SBSQ HOSP IP/OBS HIGH 50: CPT | Performed by: HOSPITALIST

## 2022-11-26 PROCEDURE — 99222 1ST HOSP IP/OBS MODERATE 55: CPT | Performed by: INTERNAL MEDICINE

## 2022-11-26 RX ORDER — IPRATROPIUM BROMIDE AND ALBUTEROL SULFATE 2.5; .5 MG/3ML; MG/3ML
3 SOLUTION RESPIRATORY (INHALATION) EVERY 6 HOURS PRN
Status: DISCONTINUED | OUTPATIENT
Start: 2022-11-26 | End: 2022-11-28

## 2022-11-26 RX ORDER — MELATONIN
2000 DAILY
Status: DISCONTINUED | OUTPATIENT
Start: 2022-11-26 | End: 2022-11-28

## 2022-11-26 RX ORDER — METHYLPREDNISOLONE SODIUM SUCCINATE 40 MG/ML
20 INJECTION, POWDER, LYOPHILIZED, FOR SOLUTION INTRAMUSCULAR; INTRAVENOUS EVERY 8 HOURS
Status: DISCONTINUED | OUTPATIENT
Start: 2022-11-26 | End: 2022-11-28

## 2022-11-26 RX ORDER — MONTELUKAST SODIUM 10 MG/1
10 TABLET ORAL EVERY MORNING
Status: DISCONTINUED | OUTPATIENT
Start: 2022-11-26 | End: 2022-11-28

## 2022-11-26 NOTE — H&P
[facility]    PATIENT'S NAME: Ramirez Donohue   ATTENDING PHYSICIAN: Marily Escobar MD   PATIENT ACCOUNT#:   [id number]    [facility]  [facility]  #:   [id number]       YOB: 1985  ADMISSION DATE:       11/25/2022    HISTORY AND PHYSICAL EXAMINATION    DATE OF EXAMINATION:  11/25/2022. CHIEF COMPLAINT:  Abdominal pain. HISTORY OF PRESENT ILLNESS:  This is a very pleasant 12-year-old gentleman with a past medical history of type 2 diabetes mellitus. Additionally, he has a history of Crohn's disease, which was initially diagnosed in 2009. It has been complicated by several episodes of perirectal and perianal abscesses, status post debridement and seton placement with diverting ileostomy in 2013. That was complicated by a johnathan-ileostomy abscess, ileocutaneous fistula, peristomal hernia, and Phasix mesh infection which required removal.  Ostomy was placed at a different site and, since that time, the patient had been doing relatively well. He has been admitted with small-bowel obstructions in the past and was last admitted in our institution with a small-bowel obstruction in June of this year. At that time, he did have an active flare of Crohn's disease as well and was treated with steroids during the hospital stay. He has been on Stelara since April 2022, with improvement in his symptoms. The patient reports that his last dose of Stelara was on November 20, 2022. The patient has noticed a small amount of mucus along with his usual stool in the ostomy tube. The patient states he did have a colonoscopy since his admit to our institution in June; I am unable to find a report of this, but the patient states that it showed no strictures and no active Crohn's disease at the time. He said that he had chips and salad on the morning of admission and shortly afterward developed an upset stomach, followed by pain which became severe.   His appetite had been good until today. His pain, he said, has been primarily in his lower abdomen. He denied any urinary symptoms. No dysuria or increased frequency of urination. No hematuria. Patient denies any rectal pain. PAST MEDICAL HISTORY:  _____ and asthma. Crohn's disease, complicated. Diabetes. Iron deficiency anemia. Colostomy, with subsequent fistulization and infection requiring removal and replacement at another site. Recurrent bowel obstructions. Gastroesophageal reflux. MEDICATIONS:  Prior to admission, _____ Levemir 14 units daily with 3 to 8 units of lispro insulin 3 times a day with meals, depending on blood sugar; montelukast 10 mg every evening; vitamin D3 at 5000 units daily; Stelara 90 mg/mL, patient gets an injection every 2 to 3 months; metformin 1000 mg twice a day; glipizide 10 mg twice a day; Januvia 50 mg daily; _____ Levemir insulin 8 units daily; famotidine 20 mg daily; Zofran 4 mg every 4 hours as needed for nausea or vomiting. ALLERGIES:  INFeD causes hives. Ciprofloxacin is also listed but reaction is not clear. FAMILY HISTORY:  The patient's mother is 64, has diabetes and thyroid problems. His father  at 54 of kidney failure; he had diabetes as well. SOCIAL HISTORY:  Patient does not smoke, drink, or use drugs. He is a . REVIEW OF SYSTEMS:  Twelve systems are reviewed. The patient complained of mild cough about 5 days ago; this has resolved completely. At that time, he was tested for COVID and influenza and was negative for both. He reports that Tylenol did help for his discomfort. He denies any urinary symptoms. There are otherwise no additional pertinent positives or negatives on a 12-point review of systems except as listed in the History of Present Illness. The patient specifically denied any fevers or chills. PHYSICAL EXAMINATION:    VITAL SIGNS:  Temperature was 97.7, pulse 85, respiratory rate 16, blood pressure 122/74.   O2 saturation 96% on room air. HEENT:  Normocephalic, atraumatic. Pupils equal, reactive. Sclerae anicteric. There was no sinus tenderness. Mucous membranes were moist.  NECK:  Supple. LUNGS:  Clear with easy respiratory excursions. I did not appreciate any wheezes, rhonchi, or rales. HEART:  Regular rate and rhythm. Normal S1, S2.  ABDOMEN:  Soft. There was tenderness to palpation in the lower abdomen, and there was mild distention. No guarding or rebound. Bowel sounds were present. There is a scar present in the right lower abdomen from the patient's previous ostomy, and his current ileostomy is in the right midabdomen. It is draining stool which is partly solid and partly liquid. No obvious blood. EXTREMITIES:  No clubbing, cyanosis, calf tenderness, palpable cords, or edema. SKIN:  Warm and dry without any significant rashes. NEUROLOGIC:  The patient was awake, alert, oriented x3 with no focal motor or sensory deficits. PSYCHIATRIC:  His mood and affect were pleasant and cooperative. BACK:  There was no costovertebral angle tenderness noted. LABORATORY DATA:  Glucose 247 with a hemoglobin A1c level of 8.7. Sodium 137, potassium 3.8, chloride 103, CO2 of 28, BUN of 8 with a creatinine of 0.8, calcium 8.2, anion gap of 6. C-reactive protein was 6.40. White count was 6.8 with a hemoglobin of 13.7 and a platelet count of 322,188. Sed rate was 62. Rapid COVID testing was negative. CT scan of the abdomen and pelvis revealed acute on chronic Crohn's disease with approximately a 20 to 25 cm segment of acute inflammation seen involving the distal small-bowel loops. There is fibrostenotic Crohn's disease with stricturing seen within a few small-bowel loops without obstruction at this point. No abscess or drainable collection. Extensive submucosal fat deposition seen throughout the colon consistent with chronic inflammation.   Large defect within the right lateral abdominal wall with herniation of small-bowel loops, unchanged; no acute obstruction or strangulation. Multiple other incidental findings as described in the body of the report, which are unchanged. ASSESSMENT AND PLAN:    1. Acute Crohn's flare. During the patient's previous admission, he was seen by Gastroenterology and started on 20 mg of Solu-Medrol 3 times a day. This will be started pending their evaluation. There are no signs of infection at this point. Will keep on clear liquid diet only. 2.   Poorly controlled diabetes. Accu-Cheks a.c. and at bedtime. Cover with sliding scale insulin. 3.   DVT prophylaxis. SCDs, subcutaneous heparin. 4.   Asthma. Nebulizers as needed. 5.   GI prophylaxis. Protonix. 6.   Iron deficiency anemia. Hemoglobin currently normal.  Continue to follow. 7.   The patient's current clinical status and proposed treatment plan were discussed with him. All of his questions were answered, and he agreed with the plan of care as outlined above.     Dictated By Nakita Schwartz MD  d: 11/26/2022 08:31:42  t: 11/26/2022 08:54:27  Job [id number]  LIZBETH/

## 2022-11-26 NOTE — ED QUICK NOTES
Pt presents for sudden onset of generalized abd pain approximately 30 minutes PTA. Pt reports nausea and one episode of vomiting. Pt denies fevers, chills. Pt reports normal oupt from his ostomy. Pt presents well-appearing, speaking in complete sentences. RR even and nonlabored. WCTM.

## 2022-11-26 NOTE — ED QUICK NOTES
Orders for admission, patient is aware of plan and ready to go upstairs. Any questions, please call ED RN Lisa Padilla at extension [phone]. Patient Covid vaccination status: Fully vaccinated     COVID Test Ordered in ED: Rapid SARS-CoV-2 by PCR    COVID Suspicion at Admission: N/A    Running Infusions:  None    Mental Status/LOC at time of transport: AAOx3    Other pertinent information: Pt ambulatory and has ostomy bag.    CIWA score: N/A   NIH score:  N/A

## 2022-11-26 NOTE — PROGRESS NOTES
ADMISSION NOTE    39year old male with h/o crohn's s/p colostomy presents with abdominal pain. CT revealed an acute flare in 20-25 cm segment of distal small bowel loops. Stricture seen without obstruction chronic abdominal wall hernia with no signs of obstruction or strangulation. . Available medical records partially reviewed. Dictation to follow.     Sonali Henderson M.D.  11/25/2022

## 2022-11-26 NOTE — PLAN OF CARE
Patient continues on IV steroids. Abdominal pain controlled with ordered pain medication. Tolerating clear liquids. Safety precautions maintained. Problem: Patient Centered Care  Goal: Patient preferences are identified and integrated in the patient's plan of care  Description: Interventions:  - What would you like us to know as we care for you?  I live at home with my family.  - Provide timely, complete, and accurate information to patient/family  - Incorporate patient and family knowledge, values, beliefs, and cultural backgrounds into the planning and delivery of care  - Encourage patient/family to participate in care and decision-making at the level they choose  - Honor patient and family perspectives and choices  Outcome: Progressing     Problem: PAIN - ADULT  Goal: Verbalizes/displays adequate comfort level or patient's stated pain goal  Description: INTERVENTIONS:  - Encourage pt to monitor pain and request assistance  - Assess pain using appropriate pain scale  - Administer analgesics based on type and severity of pain and evaluate response  - Implement non-pharmacological measures as appropriate and evaluate response  - Consider cultural and social influences on pain and pain management  - Manage/alleviate anxiety  - Utilize distraction and/or relaxation techniques  - Monitor for opioid side effects  - Notify MD/LIP if interventions unsuccessful or patient reports new pain  - Anticipate increased pain with activity and pre-medicate as appropriate  Outcome: Progressing     Problem: RISK FOR INFECTION - ADULT  Goal: Absence of fever/infection during anticipated neutropenic period  Description: INTERVENTIONS  - Monitor WBC  - Administer growth factors as ordered  - Implement neutropenic guidelines  Outcome: Progressing     Problem: SAFETY ADULT - FALL  Goal: Free from fall injury  Description: INTERVENTIONS:  - Assess pt frequently for physical needs  - Identify cognitive and physical deficits and behaviors that affect risk of falls.   - [place] fall precautions as indicated by assessment.  - Educate pt/family on patient safety including physical limitations  - Instruct pt to call for assistance with activity based on assessment  - Modify environment to reduce risk of injury  - Provide assistive devices as appropriate  - Consider OT/PT consult to assist with strengthening/mobility  - Encourage toileting schedule  Outcome: Progressing     Problem: DISCHARGE PLANNING  Goal: Discharge to home or other facility with appropriate resources  Description: INTERVENTIONS:  - Identify barriers to discharge w/pt and caregiver  - Include patient/family/discharge partner in discharge planning  - Arrange for needed discharge resources and transportation as appropriate  - Identify discharge learning needs (meds, wound care, etc)  - Arrange for interpreters to assist at discharge as needed  - Consider post-discharge preferences of patient/family/discharge partner  - Complete POLST form as appropriate  - Assess patient's ability to be responsible for managing their own health  - Refer to Case Management Department for coordinating discharge planning if the patient needs post-hospital services based on physician/LIP order or complex needs related to functional status, cognitive ability or social support system  Outcome: Progressing

## 2022-11-26 NOTE — PLAN OF CARE
Problem: Patient Centered Care  Goal: Patient preferences are identified and integrated in the patient's plan of care  Description: Interventions:  - What would you like us to know as we care for you? Pt states he has had an ostomy for 10 years. - Provide timely, complete, and accurate information to patient/family  - Incorporate patient and family knowledge, values, beliefs, and cultural backgrounds into the planning and delivery of care  - Encourage patient/family to participate in care and decision-making at the level they choose  - Honor patient and family perspectives and choices  Outcome: Progressing     Problem: PAIN - ADULT  Goal: Verbalizes/displays adequate comfort level or patient's stated pain goal  Description: INTERVENTIONS:  - Encourage pt to monitor pain and request assistance  - Assess pain using appropriate pain scale  - Administer analgesics based on type and severity of pain and evaluate response  - Implement non-pharmacological measures as appropriate and evaluate response  - Consider cultural and social influences on pain and pain management  - Manage/alleviate anxiety  - Utilize distraction and/or relaxation techniques  - Monitor for opioid side effects  - Notify MD/LIP if interventions unsuccessful or patient reports new pain  - Anticipate increased pain with activity and pre-medicate as appropriate  Outcome: Progressing     Problem: RISK FOR INFECTION - ADULT  Goal: Absence of fever/infection during anticipated neutropenic period  Description: INTERVENTIONS  - Monitor WBC  - Administer growth factors as ordered  - Implement neutropenic guidelines  Outcome: Progressing     Problem: SAFETY ADULT - FALL  Goal: Free from fall injury  Description: INTERVENTIONS:  - Assess pt frequently for physical needs  - Identify cognitive and physical deficits and behaviors that affect risk of falls.   - [place] fall precautions as indicated by assessment.  - Educate pt/family on patient safety including physical limitations  - Instruct pt to call for assistance with activity based on assessment  - Modify environment to reduce risk of injury  - Provide assistive devices as appropriate  - Consider OT/PT consult to assist with strengthening/mobility  - Encourage toileting schedule  Outcome: Progressing     Problem: DISCHARGE PLANNING  Goal: Discharge to home or other facility with appropriate resources  Description: INTERVENTIONS:  - Identify barriers to discharge w/pt and caregiver  - Include patient/family/discharge partner in discharge planning  - Arrange for needed discharge resources and transportation as appropriate  - Identify discharge learning needs (meds, wound care, etc)  - Arrange for interpreters to assist at discharge as needed  - Consider post-discharge preferences of patient/family/discharge partner  - Complete POLST form as appropriate  - Assess patient's ability to be responsible for managing their own health  - Refer to Case Management Department for coordinating discharge planning if the patient needs post-hospital services based on physician/LIP order or complex needs related to functional status, cognitive ability or social support system  Outcome: Progressing

## 2022-11-27 LAB
ALBUMIN SERPL-MCNC: 2.5 G/DL (ref 3.4–5)
ALBUMIN/GLOB SERPL: 0.6 {RATIO} (ref 1–2)
ALP LIVER SERPL-CCNC: 117 U/L
ALT SERPL-CCNC: 22 U/L
ANION GAP SERPL CALC-SCNC: 9 MMOL/L (ref 0–18)
AST SERPL-CCNC: 10 U/L (ref 15–37)
BASOPHILS # BLD AUTO: 0.01 X10(3) UL (ref 0–0.2)
BASOPHILS NFR BLD AUTO: 0.2 %
BILIRUB SERPL-MCNC: 0.3 MG/DL (ref 0.1–2)
BUN BLD-MCNC: 13 MG/DL (ref 7–18)
BUN/CREAT SERPL: 16.7 (ref 10–20)
CALCIUM BLD-MCNC: 8.3 MG/DL (ref 8.5–10.1)
CHLORIDE SERPL-SCNC: 104 MMOL/L (ref 98–112)
CO2 SERPL-SCNC: 25 MMOL/L (ref 21–32)
CREAT BLD-MCNC: 0.78 MG/DL
CRP SERPL-MCNC: 6.17 MG/DL (ref ?–0.3)
DEPRECATED RDW RBC AUTO: 34.4 FL (ref 35.1–46.3)
EOSINOPHIL # BLD AUTO: 0 X10(3) UL (ref 0–0.7)
EOSINOPHIL NFR BLD AUTO: 0 %
ERYTHROCYTE [DISTWIDTH] IN BLOOD BY AUTOMATED COUNT: 12.2 % (ref 11–15)
GFR SERPLBLD BASED ON 1.73 SQ M-ARVRAT: 119 ML/MIN/1.73M2 (ref 60–?)
GLOBULIN PLAS-MCNC: 4 G/DL (ref 2.8–4.4)
GLUCOSE BLD-MCNC: 250 MG/DL (ref 70–99)
GLUCOSE BLDC GLUCOMTR-MCNC: 225 MG/DL (ref 70–99)
GLUCOSE BLDC GLUCOMTR-MCNC: 234 MG/DL (ref 70–99)
GLUCOSE BLDC GLUCOMTR-MCNC: 234 MG/DL (ref 70–99)
GLUCOSE BLDC GLUCOMTR-MCNC: 330 MG/DL (ref 70–99)
HCT VFR BLD AUTO: 40.2 %
HGB BLD-MCNC: 12.7 G/DL
IMM GRANULOCYTES # BLD AUTO: 0.03 X10(3) UL (ref 0–1)
IMM GRANULOCYTES NFR BLD: 0.6 %
LYMPHOCYTES # BLD AUTO: 0.69 X10(3) UL (ref 1–4)
LYMPHOCYTES NFR BLD AUTO: 14.1 %
MCH RBC QN AUTO: 24.5 PG (ref 26–34)
MCHC RBC AUTO-ENTMCNC: 31.6 G/DL (ref 31–37)
MCV RBC AUTO: 77.6 FL
MONOCYTES # BLD AUTO: 0.1 X10(3) UL (ref 0.1–1)
MONOCYTES NFR BLD AUTO: 2 %
NEUTROPHILS # BLD AUTO: 4.08 X10 (3) UL (ref 1.5–7.7)
NEUTROPHILS # BLD AUTO: 4.08 X10(3) UL (ref 1.5–7.7)
NEUTROPHILS NFR BLD AUTO: 83.1 %
OSMOLALITY SERPL CALC.SUM OF ELEC: 295 MOSM/KG (ref 275–295)
PHOSPHATE SERPL-MCNC: 3.9 MG/DL (ref 2.5–4.9)
PLATELET # BLD AUTO: 267 10(3)UL (ref 150–450)
POTASSIUM SERPL-SCNC: 4.4 MMOL/L (ref 3.5–5.1)
PROT SERPL-MCNC: 6.5 G/DL (ref 6.4–8.2)
RBC # BLD AUTO: 5.18 X10(6)UL
SODIUM SERPL-SCNC: 138 MMOL/L (ref 136–145)
WBC # BLD AUTO: 4.9 X10(3) UL (ref 4–11)

## 2022-11-27 PROCEDURE — 99232 SBSQ HOSP IP/OBS MODERATE 35: CPT | Performed by: INTERNAL MEDICINE

## 2022-11-27 PROCEDURE — 99233 SBSQ HOSP IP/OBS HIGH 50: CPT | Performed by: HOSPITALIST

## 2022-11-27 NOTE — CM/SW NOTE
[address] [place] [address] transfer center at [phone] and spoke with [facility] regarding possible transfer to [place]. Face Sheet faxed:  [phone] RN stated that their beds are extremely tight but requested that Dr. David Brice call [facility] transfer center. [facility] also gave [facility] Dr. Williamson Rank phone number for their physician. [phone]    Dr. Jorge Luis Cali updated on above and v/u.

## 2022-11-27 NOTE — PLAN OF CARE
Problem: Patient Centered Care  Goal: Patient preferences are identified and integrated in the patient's plan of care  Description: Interventions:  - What would you like us to know as we care for you? Patient had ostomy placed in 2012.  - Provide timely, complete, and accurate information to patient/family  - Incorporate patient and family knowledge, values, beliefs, and cultural backgrounds into the planning and delivery of care  - Encourage patient/family to participate in care and decision-making at the level they choose  - Honor patient and family perspectives and choices  Outcome: Progressing     Problem: PAIN - ADULT  Goal: Verbalizes/displays adequate comfort level or patient's stated pain goal  Description: INTERVENTIONS:  - Encourage pt to monitor pain and request assistance  - Assess pain using appropriate pain scale  - Administer analgesics based on type and severity of pain and evaluate response  - Implement non-pharmacological measures as appropriate and evaluate response  - Consider cultural and social influences on pain and pain management  - Manage/alleviate anxiety  - Utilize distraction and/or relaxation techniques  - Monitor for opioid side effects  - Notify MD/LIP if interventions unsuccessful or patient reports new pain  - Anticipate increased pain with activity and pre-medicate as appropriate  Outcome: Progressing     Problem: RISK FOR INFECTION - ADULT  Goal: Absence of fever/infection during anticipated neutropenic period  Description: INTERVENTIONS  - Monitor WBC  - Administer growth factors as ordered  - Implement neutropenic guidelines  Outcome: Progressing     Problem: SAFETY ADULT - FALL  Goal: Free from fall injury  Description: INTERVENTIONS:  - Assess pt frequently for physical needs  - Identify cognitive and physical deficits and behaviors that affect risk of falls.   - [place] fall precautions as indicated by assessment.  - Educate pt/family on patient safety including physical limitations  - Instruct pt to call for assistance with activity based on assessment  - Modify environment to reduce risk of injury  - Provide assistive devices as appropriate  - Consider OT/PT consult to assist with strengthening/mobility  - Encourage toileting schedule  Outcome: Progressing     Problem: DISCHARGE PLANNING  Goal: Discharge to home or other facility with appropriate resources  Description: INTERVENTIONS:  - Identify barriers to discharge w/pt and caregiver  - Include patient/family/discharge partner in discharge planning  - Arrange for needed discharge resources and transportation as appropriate  - Identify discharge learning needs (meds, wound care, etc)  - Arrange for interpreters to assist at discharge as needed  - Consider post-discharge preferences of patient/family/discharge partner  - Complete POLST form as appropriate  - Assess patient's ability to be responsible for managing their own health  - Refer to Case Management Department for coordinating discharge planning if the patient needs post-hospital services based on physician/LIP order or complex needs related to functional status, cognitive ability or social support system  Outcome: Progressing

## 2022-11-27 NOTE — PLAN OF CARE
No acute changes. IV steroids continue. Pain much better today. Tolerating diet. Anticipating discharge to home tomorrow with outpatient follow up with his GI physician at [facility]. Problem: Patient Centered Care  Goal: Patient preferences are identified and integrated in the patient's plan of care  Description: Interventions:  - What would you like us to know as we care for you?  I live at home with my family.  - Provide timely, complete, and accurate information to patient/family  - Incorporate patient and family knowledge, values, beliefs, and cultural backgrounds into the planning and delivery of care  - Encourage patient/family to participate in care and decision-making at the level they choose  - Honor patient and family perspectives and choices  Outcome: Progressing     Problem: PAIN - ADULT  Goal: Verbalizes/displays adequate comfort level or patient's stated pain goal  Description: INTERVENTIONS:  - Encourage pt to monitor pain and request assistance  - Assess pain using appropriate pain scale  - Administer analgesics based on type and severity of pain and evaluate response  - Implement non-pharmacological measures as appropriate and evaluate response  - Consider cultural and social influences on pain and pain management  - Manage/alleviate anxiety  - Utilize distraction and/or relaxation techniques  - Monitor for opioid side effects  - Notify MD/LIP if interventions unsuccessful or patient reports new pain  - Anticipate increased pain with activity and pre-medicate as appropriate  Outcome: Progressing     Problem: RISK FOR INFECTION - ADULT  Goal: Absence of fever/infection during anticipated neutropenic period  Description: INTERVENTIONS  - Monitor WBC  - Administer growth factors as ordered  - Implement neutropenic guidelines  Outcome: Progressing     Problem: SAFETY ADULT - FALL  Goal: Free from fall injury  Description: INTERVENTIONS:  - Assess pt frequently for physical needs  - Identify cognitive and physical deficits and behaviors that affect risk of falls.   - [place] fall precautions as indicated by assessment.  - Educate pt/family on patient safety including physical limitations  - Instruct pt to call for assistance with activity based on assessment  - Modify environment to reduce risk of injury  - Provide assistive devices as appropriate  - Consider OT/PT consult to assist with strengthening/mobility  - Encourage toileting schedule  Outcome: Progressing     Problem: DISCHARGE PLANNING  Goal: Discharge to home or other facility with appropriate resources  Description: INTERVENTIONS:  - Identify barriers to discharge w/pt and caregiver  - Include patient/family/discharge partner in discharge planning  - Arrange for needed discharge resources and transportation as appropriate  - Identify discharge learning needs (meds, wound care, etc)  - Arrange for interpreters to assist at discharge as needed  - Consider post-discharge preferences of patient/family/discharge partner  - Complete POLST form as appropriate  - Assess patient's ability to be responsible for managing their own health  - Refer to Case Management Department for coordinating discharge planning if the patient needs post-hospital services based on physician/LIP order or complex needs related to functional status, cognitive ability or social support system  Outcome: Progressing

## 2022-11-28 VITALS
HEART RATE: 77 BPM | WEIGHT: 151.88 LBS | SYSTOLIC BLOOD PRESSURE: 124 MMHG | HEIGHT: 70 IN | DIASTOLIC BLOOD PRESSURE: 84 MMHG | BODY MASS INDEX: 21.74 KG/M2 | TEMPERATURE: 97 F | RESPIRATION RATE: 16 BRPM | OXYGEN SATURATION: 100 %

## 2022-11-28 LAB
ALBUMIN SERPL-MCNC: 2.5 G/DL (ref 3.4–5)
ALBUMIN/GLOB SERPL: 0.6 {RATIO} (ref 1–2)
ALP LIVER SERPL-CCNC: 105 U/L
ALT SERPL-CCNC: 21 U/L
ANION GAP SERPL CALC-SCNC: 6 MMOL/L (ref 0–18)
AST SERPL-CCNC: 12 U/L (ref 15–37)
BASOPHILS # BLD AUTO: 0.02 X10(3) UL (ref 0–0.2)
BASOPHILS NFR BLD AUTO: 0.3 %
BILIRUB SERPL-MCNC: 0.2 MG/DL (ref 0.1–2)
BUN BLD-MCNC: 18 MG/DL (ref 7–18)
BUN/CREAT SERPL: 21.7 (ref 10–20)
CALCIUM BLD-MCNC: 8.3 MG/DL (ref 8.5–10.1)
CHLORIDE SERPL-SCNC: 104 MMOL/L (ref 98–112)
CO2 SERPL-SCNC: 27 MMOL/L (ref 21–32)
CREAT BLD-MCNC: 0.83 MG/DL
CRP SERPL-MCNC: 2.48 MG/DL (ref ?–0.3)
DEPRECATED RDW RBC AUTO: 33 FL (ref 35.1–46.3)
EOSINOPHIL # BLD AUTO: 0 X10(3) UL (ref 0–0.7)
EOSINOPHIL NFR BLD AUTO: 0 %
ERYTHROCYTE [DISTWIDTH] IN BLOOD BY AUTOMATED COUNT: 12 % (ref 11–15)
GFR SERPLBLD BASED ON 1.73 SQ M-ARVRAT: 116 ML/MIN/1.73M2 (ref 60–?)
GLOBULIN PLAS-MCNC: 3.9 G/DL (ref 2.8–4.4)
GLUCOSE BLD-MCNC: 291 MG/DL (ref 70–99)
GLUCOSE BLDC GLUCOMTR-MCNC: 256 MG/DL (ref 70–99)
HCT VFR BLD AUTO: 39.5 %
HGB BLD-MCNC: 12.9 G/DL
IMM GRANULOCYTES # BLD AUTO: 0.04 X10(3) UL (ref 0–1)
IMM GRANULOCYTES NFR BLD: 0.6 %
LYMPHOCYTES # BLD AUTO: 1.03 X10(3) UL (ref 1–4)
LYMPHOCYTES NFR BLD AUTO: 14.3 %
MCH RBC QN AUTO: 25.1 PG (ref 26–34)
MCHC RBC AUTO-ENTMCNC: 32.7 G/DL (ref 31–37)
MCV RBC AUTO: 76.8 FL
MONOCYTES # BLD AUTO: 0.28 X10(3) UL (ref 0.1–1)
MONOCYTES NFR BLD AUTO: 3.9 %
NEUTROPHILS # BLD AUTO: 5.85 X10 (3) UL (ref 1.5–7.7)
NEUTROPHILS # BLD AUTO: 5.85 X10(3) UL (ref 1.5–7.7)
NEUTROPHILS NFR BLD AUTO: 80.9 %
OSMOLALITY SERPL CALC.SUM OF ELEC: 297 MOSM/KG (ref 275–295)
PLATELET # BLD AUTO: 259 10(3)UL (ref 150–450)
POTASSIUM SERPL-SCNC: 4.9 MMOL/L (ref 3.5–5.1)
PROT SERPL-MCNC: 6.4 G/DL (ref 6.4–8.2)
RBC # BLD AUTO: 5.14 X10(6)UL
SODIUM SERPL-SCNC: 137 MMOL/L (ref 136–145)
WBC # BLD AUTO: 7.2 X10(3) UL (ref 4–11)

## 2022-11-28 PROCEDURE — 99239 HOSP IP/OBS DSCHRG MGMT >30: CPT | Performed by: HOSPITALIST

## 2022-11-28 RX ORDER — PREDNISONE 20 MG/1
TABLET ORAL
Qty: 20 TABLET | Refills: 0 | Status: SHIPPED | OUTPATIENT
Start: 2022-11-28 | End: 2022-11-28

## 2022-11-28 RX ORDER — PREDNISONE 20 MG/1
40 TABLET ORAL DAILY
Qty: 28 TABLET | Refills: 0 | Status: SHIPPED | OUTPATIENT
Start: 2022-11-28

## 2022-11-28 NOTE — PLAN OF CARE
Monitoring vital signs- stable at this time. No acute changes noted throughout shift. Saline locked . Monitoring blood glucose levels per order, tolerates low fiber diet. Patient is on RA, denies pain, patient ambulated by self. Call light and personal belongings within reach, non-skid socks in place to bilateral feet. Frequent rounding by nursing staff. Discharge information and education provided. Patient is being discharged home with family. Flu shot and education provided. IV removed.    Problem: Patient Centered Care  Goal: Patient preferences are identified and integrated in the patient's plan of care  Description: Interventions:  - What would you like us to know as we care for you?   - Provide timely, complete, and accurate information to patient/family  - Incorporate patient and family knowledge, values, beliefs, and cultural backgrounds into the planning and delivery of care  - Encourage patient/family to participate in care and decision-making at the level they choose  - Honor patient and family perspectives and choices  Outcome: Adequate for Discharge     Problem: PAIN - ADULT  Goal: Verbalizes/displays adequate comfort level or patient's stated pain goal  Description: INTERVENTIONS:  - Encourage pt to monitor pain and request assistance  - Assess pain using appropriate pain scale  - Administer analgesics based on type and severity of pain and evaluate response  - Implement non-pharmacological measures as appropriate and evaluate response  - Consider cultural and social influences on pain and pain management  - Manage/alleviate anxiety  - Utilize distraction and/or relaxation techniques  - Monitor for opioid side effects  - Notify MD/LIP if interventions unsuccessful or patient reports new pain  - Anticipate increased pain with activity and pre-medicate as appropriate  Outcome: Adequate for Discharge     Problem: RISK FOR INFECTION - ADULT  Goal: Absence of fever/infection during anticipated neutropenic period  Description: INTERVENTIONS  - Monitor WBC  - Administer growth factors as ordered  - Implement neutropenic guidelines  Outcome: Adequate for Discharge     Problem: SAFETY ADULT - FALL  Goal: Free from fall injury  Description: INTERVENTIONS:  - Assess pt frequently for physical needs  - Identify cognitive and physical deficits and behaviors that affect risk of falls.   - [place] fall precautions as indicated by assessment.  - Educate pt/family on patient safety including physical limitations  - Instruct pt to call for assistance with activity based on assessment  - Modify environment to reduce risk of injury  - Provide assistive devices as appropriate  - Consider OT/PT consult to assist with strengthening/mobility  - Encourage toileting schedule  Outcome: Adequate for Discharge     Problem: DISCHARGE PLANNING  Goal: Discharge to home or other facility with appropriate resources  Description: INTERVENTIONS:  - Identify barriers to discharge w/pt and caregiver  - Include patient/family/discharge partner in discharge planning  - Arrange for needed discharge resources and transportation as appropriate  - Identify discharge learning needs (meds, wound care, etc)  - Arrange for interpreters to assist at discharge as needed  - Consider post-discharge preferences of patient/family/discharge partner  - Complete POLST form as appropriate  - Assess patient's ability to be responsible for managing their own health  - Refer to Case Management Department for coordinating discharge planning if the patient needs post-hospital services based on physician/LIP order or complex needs related to functional status, cognitive ability or social support system  Outcome: Adequate for Discharge

## 2022-11-28 NOTE — PLAN OF CARE
Problem: PAIN - ADULT  Goal: Verbalizes/displays adequate comfort level or patient's stated pain goal  Description: INTERVENTIONS:  - Encourage pt to monitor pain and request assistance  - Assess pain using appropriate pain scale  - Administer analgesics based on type and severity of pain and evaluate response  - Implement non-pharmacological measures as appropriate and evaluate response  - Consider cultural and social influences on pain and pain management  - Manage/alleviate anxiety  - Utilize distraction and/or relaxation techniques  - Monitor for opioid side effects  - Notify MD/LIP if interventions unsuccessful or patient reports new pain  - Anticipate increased pain with activity and pre-medicate as appropriate  Outcome: Progressing     Problem: RISK FOR INFECTION - ADULT  Goal: Absence of fever/infection during anticipated neutropenic period  Description: INTERVENTIONS  - Monitor WBC  - Administer growth factors as ordered  - Implement neutropenic guidelines  Outcome: Progressing     Problem: SAFETY ADULT - FALL  Goal: Free from fall injury  Description: INTERVENTIONS:  - Assess pt frequently for physical needs  - Identify cognitive and physical deficits and behaviors that affect risk of falls.   - [place] fall precautions as indicated by assessment.  - Educate pt/family on patient safety including physical limitations  - Instruct pt to call for assistance with activity based on assessment  - Modify environment to reduce risk of injury  - Provide assistive devices as appropriate  - Consider OT/PT consult to assist with strengthening/mobility  - Encourage toileting schedule  Outcome: Progressing     Problem: DISCHARGE PLANNING  Goal: Discharge to home or other facility with appropriate resources  Description: INTERVENTIONS:  - Identify barriers to discharge w/pt and caregiver  - Include patient/family/discharge partner in discharge planning  - Arrange for needed discharge resources and transportation as appropriate  - Identify discharge learning needs (meds, wound care, etc)  - Arrange for interpreters to assist at discharge as needed  - Consider post-discharge preferences of patient/family/discharge partner  - Complete POLST form as appropriate  - Assess patient's ability to be responsible for managing their own health  - Refer to Case Management Department for coordinating discharge planning if the patient needs post-hospital services based on physician/LIP order or complex needs related to functional status, cognitive ability or social support system  Outcome: Progressing

## 2022-11-30 ENCOUNTER — TELEPHONE (OUTPATIENT)
Dept: HEMATOLOGY/ONCOLOGY | Facility: HOSPITAL | Age: 37
End: 2022-11-30

## 2022-11-30 NOTE — TELEPHONE ENCOUNTER
Patient left a voicemail asking to reschedule Venofer and B12 injection to the next available time and date. I returned patients call no answer. I left a detailed message that I've canceled the appointment as requested and reschedule.  If the new date and time does not work advise patient to please call again

## 2022-12-01 ENCOUNTER — APPOINTMENT (OUTPATIENT)
Dept: HEMATOLOGY/ONCOLOGY | Facility: HOSPITAL | Age: 37
End: 2022-12-01
Attending: INTERNAL MEDICINE
Payer: MEDICAID

## 2022-12-07 ENCOUNTER — PATIENT OUTREACH (OUTPATIENT)
Dept: CASE MANAGEMENT | Age: 37
End: 2022-12-07

## 2022-12-07 NOTE — PROGRESS NOTES
1st attempt to review DM f/u questions    Pt has questions about med and what he can and cannot take because he has crones. Pt is going to DM clinic on Fri 12/09 and will be asking them questions. Pt answered all questions  Closing encounter    Diabetic Outreach D/C Follow Up Questions:     1. Did you receive the information provided during your hospitalization and at discharge about diabetes? yes    If No:  Would you like us to mail you the information? no   If Yes:  Was the information helpful? Yes              2. Do you have all of your diabetes medications now that you are home? Yes  If yes:  do you understand how to take your medications. yes    If No: Are there barriers to getting your medications? no       3. Did you make the necessary transportation arrangements to get to your diabetes follow-up appointment? Yes         If pt answers No to questions #2 and #3 Advise pt you will have a clinical person contact them to address.

## 2022-12-08 ENCOUNTER — OFFICE VISIT (OUTPATIENT)
Dept: HEMATOLOGY/ONCOLOGY | Facility: HOSPITAL | Age: 37
End: 2022-12-08
Attending: INTERNAL MEDICINE
Payer: MEDICAID

## 2022-12-08 VITALS
OXYGEN SATURATION: 98 % | SYSTOLIC BLOOD PRESSURE: 114 MMHG | HEART RATE: 95 BPM | TEMPERATURE: 98 F | RESPIRATION RATE: 16 BRPM | DIASTOLIC BLOOD PRESSURE: 69 MMHG

## 2022-12-08 DIAGNOSIS — D50.0 IRON DEFICIENCY ANEMIA DUE TO CHRONIC BLOOD LOSS: Primary | ICD-10-CM

## 2022-12-08 DIAGNOSIS — K90.89 OTHER SPECIFIED INTESTINAL MALABSORPTION: ICD-10-CM

## 2022-12-08 DIAGNOSIS — D50.8 IRON DEFICIENCY ANEMIA SECONDARY TO INADEQUATE DIETARY IRON INTAKE: ICD-10-CM

## 2022-12-08 PROCEDURE — 96372 THER/PROPH/DIAG INJ SC/IM: CPT

## 2022-12-08 PROCEDURE — 96374 THER/PROPH/DIAG INJ IV PUSH: CPT

## 2022-12-08 RX ORDER — CYANOCOBALAMIN 1000 UG/ML
1000 INJECTION, SOLUTION INTRAMUSCULAR; SUBCUTANEOUS ONCE
Start: 2022-12-15 | End: 2022-12-15

## 2022-12-08 RX ORDER — CYANOCOBALAMIN 1000 UG/ML
INJECTION, SOLUTION INTRAMUSCULAR; SUBCUTANEOUS
Status: COMPLETED
Start: 2022-12-08 | End: 2022-12-08

## 2022-12-08 RX ORDER — CYANOCOBALAMIN 1000 UG/ML
1000 INJECTION, SOLUTION INTRAMUSCULAR; SUBCUTANEOUS ONCE
Status: COMPLETED | OUTPATIENT
Start: 2022-12-08 | End: 2022-12-08

## 2022-12-08 RX ADMIN — CYANOCOBALAMIN 1000 MCG: 1000 INJECTION, SOLUTION INTRAMUSCULAR; SUBCUTANEOUS at 14:36:00

## 2022-12-08 NOTE — PROGRESS NOTES
Presents for every 4 week venofer 200mg and b12 injection. Timing off as he has been in the hospital.    b12 given  200mg venofer iv push slow with + blood return given. [place] Route appeared to tolerate with no s/s of adverse reaction noted. Does not stay for observation. Discharged stable. Future appointments made-request Friday.

## 2022-12-09 ENCOUNTER — HOSPITAL ENCOUNTER (OUTPATIENT)
Dept: ENDOCRINOLOGY | Facility: HOSPITAL | Age: 37
Discharge: HOME | End: 2022-12-09
Attending: NURSE PRACTITIONER
Payer: MEDICAID

## 2022-12-09 VITALS — WEIGHT: 151 LBS | BODY MASS INDEX: 22 KG/M2

## 2022-12-09 DIAGNOSIS — E11.9 TYPE 2 DIABETES MELLITUS WITHOUT RETINOPATHY (HCC): Primary | ICD-10-CM

## 2022-12-09 DIAGNOSIS — K50.112 CROHN'S DISEASE OF COLON WITH INTESTINAL OBSTRUCTION (HCC): ICD-10-CM

## 2022-12-09 PROCEDURE — 97802 MEDICAL NUTRITION INDIV IN: CPT

## 2022-12-09 NOTE — PROGRESS NOTES
Medical Nutrition Therapy Assessment    Jose F Mckeon 12/2/1985 was seen for individual Diabetic Medical Nutrition Therapy:  Initial/individual    Date: 12/9/2022   Start time 0845   End time: 0945    Assessment  Pt with insulin-requiring diabetes for about 5 years and h/o Crohn's disease for several years. He states that he needs assistance with combining diet therapy for both conditions, brandon concerning recommendations like eating more whole grains and raw vegetables. He also notes that some foods cause very high blood sugars afterwards and asks how he can he prevent this. Rice is staple of diet due to culture and it's a filling food that he tolerates well with Crohn's. He is also currently taking Prednisone. He has a new Joy 2 that he plans to start today- already has montse on his phone and is connected to endo office. Anthropometrics: There were no vitals taken for this visit. Current diabetes medications:  Levemir- 14 units  Humalog- 5 units ac with high BG correction scale starting at BG of 150      Labs:  Lab Results   Component Value Date    A1C 8.7% 11/22/2022    A1C 8.5% 09/15/2022    CHOLEST 100 12/05/2020    LDL 54 12/05/2020    HDL 31 (L) 12/05/2020    NONHDLC 69 12/05/2020    TRIG 76 12/05/2020    BUN 18 11/28/2022    BUN 13 11/27/2022    CREATSERUM 0.83 11/28/2022    CREATSERUM 0.78 11/27/2022    GFRNAA 112 06/20/2022    GFRNAA 107 06/19/2022    GFRAA 130 06/20/2022    GFRAA 123 06/19/2022       SMBG at home: yes  Frequency of testing multiple with Joy 2   BG results: per patient report   BG: usually in the 200s    Freestyle report from Sept shows only 30% BG in target range, 0% below target.   Pt states that insulin has been adjusted since then and he is now doing better at taking all Humalog doses and takes before the meal.      Diet Hx:  (a.m.) egg or cheese, 1 slice white bread or 1- 1 1/2 roti  (mid-day) and pm:  2 cps of rice (pt unsure of exact amount), singh with vegetables and lentils, chicken or beef  May have fruit as snacks if still hungry or a yogurt drink made with water and alejandra to fill him up      Nutrition Diagnosis  Behavioral and environmental: nutrition and nutrition-related knowledge deficit r/t diabetes and Crohns disease      Intervention  Comprehensive Nutrition Education Provided:     [x] discussed Crohns disease management and  glucose management as related to diabetes   [x] discussed basic meal planning guidelines for diabetes regular mealtime, limited concentrated sweets. Worked on establishing eating pattern/timing of meals and snacks    [x] discussed in depth meal planning using the healthy eating with diabetes plate method with focus on balanced macronutrient consumption, including identifying foods that are carbohydrates, lean protein, non-starchy vegetables, and heart healthy fats. Taught carb counting and importance of measuring food portions.    [] stressed importance of carbohydrate consistency in each meal   [] recommended carbohydrate targets of grams at meals and grams at snacks   [x] educated on label reading   [x] educated on portion control; including use of measuring cups, spoons, or food scale   [] provided suggestions for lower carb, healthy snacks   [x] educated on importance of food monitoring and how to use food logs   >> pt to enter insulin doses and carbs grams eaten as events and notes in his [employer] montse   [] discussed how to handle special occasions, dining out, and eating on the go   [] discussed menu planning, healthy food shopping, cooking tips, and need to pre prepare foods    [] educated on emotional eating and being mindful at meals   []  reviewed other behavior modification strategies    [x]emphasized the need for small, sustainable changes and working on SMART goals to encourage sustainable behaviors    [] instructions on how to use helpful websites or nutrition/tracking apps reviewed    [x] taught to use carbohydrate to insulin ratio >>pt may want to start to try 15:1 carb ratio based on current TDD estimate of 29 units        Monitoring  diet modification/understanding, blood sugars and hemoglobin A1c    Evaluation  Behavioral Goal(s) selected:   Healthy Eating and Problem-Solving    Support Plan:  The use of Diabetes Websites or Apps      Plan  Blood sugar goals: less than 130 mg/dl in the morning and less than 180 mg/dl 2 hours after meals. Keep glucose tabs or fast acting carbohydrates with you for treatment of lows; for blood glucose levels less than 70 mg/dl, take 15 grams of fast acting carbohydrates (3-4 glucose tabs, 4 ounces of juice, or as discussed). Retest in 15 min. If not above 70 mg/dl, retreat. Will check  Pt's Libreview in 2 weeks to get more accurate TTD to establish an insulin to carb ratio. Recommended a follow up after next endo visit. Call Timothy Powers RD at [phone] (option 3) for problems/concerns.        Timothy Powers RD

## 2022-12-22 ENCOUNTER — TELEPHONE (OUTPATIENT)
Dept: ENDOCRINOLOGY | Facility: HOSPITAL | Age: 37
End: 2022-12-22

## 2022-12-22 NOTE — TELEPHONE ENCOUNTER
Called pt as a follow up to his MNT appt in [facility] 2 weeks ago. He states that his Bertha Coronel has not been getting readings for the past 5 days, so he is waiting for a call back from the company. He states that he feels his BG levels are better and that he is occasionally adding a 1-2 units of mealtime insulin (15:1 carb ratio) if he eats more carbs than usual (starches are better tolerated with his IBS.)  Told pt to call if he needs follow up with diet.

## 2022-12-23 RX ORDER — INSULIN LISPRO 100 [IU]/ML
INJECTION, SOLUTION INTRAVENOUS; SUBCUTANEOUS
Qty: 15 ML | Refills: 0 | Status: SHIPPED | OUTPATIENT
Start: 2022-12-23

## 2022-12-23 NOTE — TELEPHONE ENCOUNTER
Insulin Lispro, 1 Unit Dial, (HUMALOG KWIKPEN) 100 UNIT/ML Subcutaneous Solution Pen-injector, Inject 3-8 Units into the skin 3 (three) times daily with meals.  Take with meal based on scale maximum daily dose is 24 units, Disp: 15 mL, Rfl: 0

## 2023-01-05 ENCOUNTER — APPOINTMENT (OUTPATIENT)
Dept: HEMATOLOGY/ONCOLOGY | Facility: HOSPITAL | Age: 38
End: 2023-01-05
Attending: INTERNAL MEDICINE
Payer: MEDICAID

## 2023-01-06 ENCOUNTER — OFFICE VISIT (OUTPATIENT)
Dept: HEMATOLOGY/ONCOLOGY | Facility: HOSPITAL | Age: 38
End: 2023-01-06
Attending: INTERNAL MEDICINE
Payer: MEDICAID

## 2023-01-06 VITALS
OXYGEN SATURATION: 98 % | DIASTOLIC BLOOD PRESSURE: 74 MMHG | SYSTOLIC BLOOD PRESSURE: 117 MMHG | TEMPERATURE: 98 F | RESPIRATION RATE: 16 BRPM | HEART RATE: 96 BPM

## 2023-01-06 DIAGNOSIS — K90.89 OTHER SPECIFIED INTESTINAL MALABSORPTION: ICD-10-CM

## 2023-01-06 DIAGNOSIS — D50.8 IRON DEFICIENCY ANEMIA SECONDARY TO INADEQUATE DIETARY IRON INTAKE: ICD-10-CM

## 2023-01-06 DIAGNOSIS — D50.0 IRON DEFICIENCY ANEMIA DUE TO CHRONIC BLOOD LOSS: Primary | ICD-10-CM

## 2023-01-06 PROCEDURE — 96374 THER/PROPH/DIAG INJ IV PUSH: CPT

## 2023-01-06 PROCEDURE — 96372 THER/PROPH/DIAG INJ SC/IM: CPT

## 2023-01-06 RX ORDER — CYANOCOBALAMIN 1000 UG/ML
1000 INJECTION, SOLUTION INTRAMUSCULAR; SUBCUTANEOUS ONCE
Start: 2023-01-20 | End: 2023-01-20

## 2023-01-06 RX ORDER — CYANOCOBALAMIN 1000 UG/ML
1000 INJECTION, SOLUTION INTRAMUSCULAR; SUBCUTANEOUS ONCE
Status: COMPLETED | OUTPATIENT
Start: 2023-01-06 | End: 2023-01-06

## 2023-01-06 RX ORDER — CYANOCOBALAMIN 1000 UG/ML
INJECTION, SOLUTION INTRAMUSCULAR; SUBCUTANEOUS
Status: COMPLETED
Start: 2023-01-06 | End: 2023-01-06

## 2023-01-06 RX ADMIN — CYANOCOBALAMIN 1000 MCG: 1000 INJECTION, SOLUTION INTRAMUSCULAR; SUBCUTANEOUS at 14:21:00

## 2023-01-06 NOTE — PROGRESS NOTES
Pt here for monthly venofer infusion and vitamin B12 injection. Pt denies any issues or concerns. Ordering MD: Dr. Broderick Rey       Pt tolerated infusion without difficulty or complaint. 30 minute observation completed and vital signs stable. Reviewed next apt date/time.       Education Record    Learner:  Patient    Disease / Diagnosis: GAL    Barriers / Limitations:  None   Comments:    Method:  Discussion   Comments:    General Topics:  Plan of care reviewed   Comments:    Outcome:  Shows understanding   Comments:

## 2023-01-31 RX ORDER — FLASH GLUCOSE SENSOR
KIT MISCELLANEOUS
Qty: 2 EACH | Refills: 2 | Status: SHIPPED | OUTPATIENT
Start: 2023-01-31

## 2023-02-01 ENCOUNTER — TELEPHONE (OUTPATIENT)
Dept: ENDOCRINOLOGY CLINIC | Facility: CLINIC | Age: 38
End: 2023-02-01

## 2023-02-01 NOTE — TELEPHONE ENCOUNTER
Caroline is calling to verify if they can switch pt's levemir to lantus. Levemir is currently on backorder.     However not sure if medicaid would cover it

## 2023-02-02 RX ORDER — INSULIN GLARGINE 100 [IU]/ML
14 INJECTION, SOLUTION SUBCUTANEOUS DAILY
Qty: 15 ML | Refills: 0 | Status: SHIPPED | OUTPATIENT
Start: 2023-02-02

## 2023-02-02 NOTE — TELEPHONE ENCOUNTER
Noted. Ok to transition to glargine insulin. Pended rx signed. Pleas inform patient of this change.      Thank you

## 2023-02-02 NOTE — TELEPHONE ENCOUNTER
[facility] [address] [place] [address],    Please advise regarding Rx pending for approval. Per Epic, Glargine is forumlary level 2 vs. Lantus is formulary level 4

## 2023-02-03 ENCOUNTER — APPOINTMENT (OUTPATIENT)
Dept: HEMATOLOGY/ONCOLOGY | Facility: HOSPITAL | Age: 38
End: 2023-02-03
Attending: INTERNAL MEDICINE
Payer: MEDICAID

## 2023-02-03 NOTE — TELEPHONE ENCOUNTER
Called pharmacy and RN was informed RX went through. Called patient regarding the change. He voiced understanding and denied questions at this time.

## 2023-02-10 ENCOUNTER — OFFICE VISIT (OUTPATIENT)
Dept: HEMATOLOGY/ONCOLOGY | Facility: HOSPITAL | Age: 38
End: 2023-02-10
Attending: INTERNAL MEDICINE
Payer: MEDICAID

## 2023-02-10 VITALS
OXYGEN SATURATION: 97 % | DIASTOLIC BLOOD PRESSURE: 61 MMHG | SYSTOLIC BLOOD PRESSURE: 121 MMHG | HEART RATE: 92 BPM | RESPIRATION RATE: 16 BRPM | TEMPERATURE: 98 F

## 2023-02-10 DIAGNOSIS — K90.89 OTHER SPECIFIED INTESTINAL MALABSORPTION: ICD-10-CM

## 2023-02-10 DIAGNOSIS — D50.0 IRON DEFICIENCY ANEMIA DUE TO CHRONIC BLOOD LOSS: Primary | ICD-10-CM

## 2023-02-10 DIAGNOSIS — D50.8 IRON DEFICIENCY ANEMIA SECONDARY TO INADEQUATE DIETARY IRON INTAKE: ICD-10-CM

## 2023-02-10 LAB
BASOPHILS # BLD AUTO: 0.04 X10(3) UL (ref 0–0.2)
BASOPHILS NFR BLD AUTO: 1 %
DEPRECATED HBV CORE AB SER IA-ACNC: 353.7 NG/ML
DEPRECATED RDW RBC AUTO: 35.4 FL (ref 35.1–46.3)
EOSINOPHIL # BLD AUTO: 0.01 X10(3) UL (ref 0–0.7)
EOSINOPHIL NFR BLD AUTO: 0.3 %
ERYTHROCYTE [DISTWIDTH] IN BLOOD BY AUTOMATED COUNT: 12.8 % (ref 11–15)
HCT VFR BLD AUTO: 36.6 %
HGB BLD-MCNC: 12 G/DL
IMM GRANULOCYTES # BLD AUTO: 0.05 X10(3) UL (ref 0–1)
IMM GRANULOCYTES NFR BLD: 1.3 %
IRON SATN MFR SERPL: 219 %
IRON SERPL-MCNC: 564 UG/DL
LYMPHOCYTES # BLD AUTO: 0.59 X10(3) UL (ref 1–4)
LYMPHOCYTES NFR BLD AUTO: 15.1 %
MCH RBC QN AUTO: 25.1 PG (ref 26–34)
MCHC RBC AUTO-ENTMCNC: 32.8 G/DL (ref 31–37)
MCV RBC AUTO: 76.4 FL
MONOCYTES # BLD AUTO: 0.15 X10(3) UL (ref 0.1–1)
MONOCYTES NFR BLD AUTO: 3.8 %
NEUTROPHILS # BLD AUTO: 3.06 X10 (3) UL (ref 1.5–7.7)
NEUTROPHILS # BLD AUTO: 3.06 X10(3) UL (ref 1.5–7.7)
NEUTROPHILS NFR BLD AUTO: 78.5 %
PLATELET # BLD AUTO: 283 10(3)UL (ref 150–450)
RBC # BLD AUTO: 4.79 X10(6)UL
TIBC SERPL-MCNC: 258 UG/DL (ref 240–450)
TRANSFERRIN SERPL-MCNC: 173 MG/DL (ref 200–360)
WBC # BLD AUTO: 3.9 X10(3) UL (ref 4–11)

## 2023-02-10 PROCEDURE — 84466 ASSAY OF TRANSFERRIN: CPT

## 2023-02-10 PROCEDURE — 82728 ASSAY OF FERRITIN: CPT

## 2023-02-10 PROCEDURE — 83540 ASSAY OF IRON: CPT

## 2023-02-10 PROCEDURE — 96374 THER/PROPH/DIAG INJ IV PUSH: CPT

## 2023-02-10 PROCEDURE — 96372 THER/PROPH/DIAG INJ SC/IM: CPT

## 2023-02-10 PROCEDURE — 85025 COMPLETE CBC W/AUTO DIFF WBC: CPT

## 2023-02-10 RX ORDER — CYANOCOBALAMIN 1000 UG/ML
INJECTION, SOLUTION INTRAMUSCULAR; SUBCUTANEOUS
Status: COMPLETED
Start: 2023-02-10 | End: 2023-02-10

## 2023-02-10 RX ORDER — CYANOCOBALAMIN 1000 UG/ML
1000 INJECTION, SOLUTION INTRAMUSCULAR; SUBCUTANEOUS ONCE
Status: COMPLETED | OUTPATIENT
Start: 2023-02-10 | End: 2023-02-10

## 2023-02-10 RX ORDER — CYANOCOBALAMIN 1000 UG/ML
1000 INJECTION, SOLUTION INTRAMUSCULAR; SUBCUTANEOUS ONCE
Start: 2023-02-17 | End: 2023-02-17

## 2023-02-10 RX ORDER — CYANOCOBALAMIN 1000 UG/ML
1000 INJECTION, SOLUTION INTRAMUSCULAR; SUBCUTANEOUS ONCE
Status: CANCELLED
Start: 2023-02-10 | End: 2023-02-10

## 2023-02-10 RX ADMIN — CYANOCOBALAMIN 1000 MCG: 1000 INJECTION, SOLUTION INTRAMUSCULAR; SUBCUTANEOUS at 16:38:00

## 2023-02-10 NOTE — PROGRESS NOTES
Presents for every 4 week venofer 200mg and b12 injection. Denies complaints. Labs drawn for iron studies and CBC for MD f/u in a few weeks. B12 given to L deltoid-tolerated well. 200mg venofer iv push slow with + blood return given. Odilon Bones appeared to tolerate with no s/s of adverse reaction noted. Does not stay for observation. Discharged stable. Future appointments made-request Friday.

## 2023-02-22 ENCOUNTER — OFFICE VISIT (OUTPATIENT)
Dept: HEMATOLOGY/ONCOLOGY | Facility: HOSPITAL | Age: 38
End: 2023-02-22
Attending: INTERNAL MEDICINE
Payer: MEDICAID

## 2023-02-22 VITALS
WEIGHT: 150.81 LBS | HEIGHT: 67 IN | BODY MASS INDEX: 23.67 KG/M2 | DIASTOLIC BLOOD PRESSURE: 74 MMHG | TEMPERATURE: 98 F | HEART RATE: 95 BPM | SYSTOLIC BLOOD PRESSURE: 127 MMHG | OXYGEN SATURATION: 99 % | RESPIRATION RATE: 18 BRPM

## 2023-02-22 DIAGNOSIS — K90.9 IRON MALABSORPTION: ICD-10-CM

## 2023-02-22 DIAGNOSIS — D50.0 IRON DEFICIENCY ANEMIA DUE TO CHRONIC BLOOD LOSS: Primary | ICD-10-CM

## 2023-02-22 PROCEDURE — 99214 OFFICE O/P EST MOD 30 MIN: CPT | Performed by: INTERNAL MEDICINE

## 2023-03-02 ENCOUNTER — OFFICE VISIT (OUTPATIENT)
Dept: ENDOCRINOLOGY CLINIC | Facility: CLINIC | Age: 38
End: 2023-03-02

## 2023-03-02 VITALS
BODY MASS INDEX: 25 KG/M2 | DIASTOLIC BLOOD PRESSURE: 75 MMHG | SYSTOLIC BLOOD PRESSURE: 116 MMHG | HEART RATE: 87 BPM | WEIGHT: 157 LBS

## 2023-03-02 DIAGNOSIS — E11.65 TYPE 2 DIABETES MELLITUS WITH HYPERGLYCEMIA, WITH LONG-TERM CURRENT USE OF INSULIN (HCC): Primary | ICD-10-CM

## 2023-03-02 DIAGNOSIS — Z79.4 TYPE 2 DIABETES MELLITUS WITH HYPERGLYCEMIA, WITH LONG-TERM CURRENT USE OF INSULIN (HCC): Primary | ICD-10-CM

## 2023-03-02 LAB
CARTRIDGE LOT#: ABNORMAL NUMERIC
GLUCOSE BLOOD: 179
HEMOGLOBIN A1C: 10.6 % (ref 4.3–5.6)
TEST STRIP LOT #: NORMAL NUMERIC

## 2023-03-02 PROCEDURE — 3074F SYST BP LT 130 MM HG: CPT | Performed by: NURSE PRACTITIONER

## 2023-03-02 PROCEDURE — 3046F HEMOGLOBIN A1C LEVEL >9.0%: CPT | Performed by: NURSE PRACTITIONER

## 2023-03-02 PROCEDURE — 99214 OFFICE O/P EST MOD 30 MIN: CPT | Performed by: NURSE PRACTITIONER

## 2023-03-02 PROCEDURE — 82947 ASSAY GLUCOSE BLOOD QUANT: CPT | Performed by: NURSE PRACTITIONER

## 2023-03-02 PROCEDURE — 83036 HEMOGLOBIN GLYCOSYLATED A1C: CPT | Performed by: NURSE PRACTITIONER

## 2023-03-02 PROCEDURE — 3078F DIAST BP <80 MM HG: CPT | Performed by: NURSE PRACTITIONER

## 2023-03-02 RX ORDER — BLOOD-GLUCOSE TRANSMITTER
EACH MISCELLANEOUS
Qty: 1 EACH | Refills: 0 | Status: SHIPPED | OUTPATIENT
Start: 2023-03-02

## 2023-03-02 RX ORDER — INSULIN PMP CART,AUT,G6/7,CNTR
1 EACH SUBCUTANEOUS
Qty: 6 EACH | Refills: 0 | Status: SHIPPED | OUTPATIENT
Start: 2023-03-02

## 2023-03-02 RX ORDER — BLOOD-GLUCOSE SENSOR
EACH MISCELLANEOUS
Qty: 3 EACH | Refills: 2 | Status: SHIPPED | OUTPATIENT
Start: 2023-03-02

## 2023-03-02 RX ORDER — INSULIN PMP CART,AUT,G6/7,CNTR
1 EACH SUBCUTANEOUS AS DIRECTED
Qty: 1 KIT | Refills: 0 | Status: SHIPPED | OUTPATIENT
Start: 2023-03-02

## 2023-03-02 RX ORDER — BLOOD-GLUCOSE,RECEIVER,CONT
1 EACH MISCELLANEOUS DAILY
Qty: 1 EACH | Refills: 0 | Status: SHIPPED | OUTPATIENT
Start: 2023-03-02

## 2023-03-02 NOTE — PATIENT INSTRUCTIONS
A1C: 10.6% today --> increased from 8.7% on 11/22/2022  Blood glucose: 179 in clinic today  Endocrinology fax# [phone]    Medications:   -continue with Glipizide 10mg twice daily   -continue with Metformin 1,000mg twice daily   -continue with Januvia 50mg once daily in AM  - increase Levemir 14--> 18 units nightly   -continue with Humalog 5 units with breakfast       5 units with lunch      5 units with dinner     -start Omnipod 5 insulin pump    - once you are able to  the insulin pump from pharmacy, please call diabetes learning center to schedule pump start education     A1C goal:  <7.0%    Blood sugar testing:  Start Dexcom G6 continuous glucometer     Blood sugar targets:  Before breakfast:   (preferably < 110)  Before meals OR 2 hours after meals: <150    Call for persistent blood sugars < 75 or > 200. How to connect your [email or web address] joy with our clinic:     If you are using your phone as your , please follow steps below to connect your [employer] account with your practice. Please note we are only going to view your report when you call letting us know you are having issues with your blood sugars. Open Freestyle Joy 2 montse on your phone. Tap Menu icon (3 horizontal lines). Select Connected Apps. Select [employer]. Tap Connect to [employer]. Enter Practice ID: [id number]  Confirm practice by tapping Connect. Tap Done. If you are using the actual  that came in with the sensor kit, please log in to your Fiksu account. You would then connect your  to a desktop or laptop using the USB cord it came with and follow up the steps you see on your screen. This link will also show you the process (https://[email or web address] [email or web address]. "[employer]/pdf/[employer]%20Instructions%20for%20FreeStyle%20Libre%202. pdf). If you do not have an account with [employer] yet, please provide us with your e-mail address so we can send you the invitation. Thank you.

## 2023-03-03 ENCOUNTER — APPOINTMENT (OUTPATIENT)
Dept: HEMATOLOGY/ONCOLOGY | Facility: HOSPITAL | Age: 38
End: 2023-03-03
Attending: INTERNAL MEDICINE
Payer: MEDICAID

## 2023-03-10 ENCOUNTER — OFFICE VISIT (OUTPATIENT)
Dept: HEMATOLOGY/ONCOLOGY | Facility: HOSPITAL | Age: 38
End: 2023-03-10
Attending: INTERNAL MEDICINE
Payer: MEDICAID

## 2023-03-10 VITALS
DIASTOLIC BLOOD PRESSURE: 76 MMHG | RESPIRATION RATE: 18 BRPM | TEMPERATURE: 99 F | HEART RATE: 99 BPM | SYSTOLIC BLOOD PRESSURE: 114 MMHG | OXYGEN SATURATION: 99 %

## 2023-03-10 DIAGNOSIS — D50.0 IRON DEFICIENCY ANEMIA DUE TO CHRONIC BLOOD LOSS: Primary | ICD-10-CM

## 2023-03-10 DIAGNOSIS — D50.8 IRON DEFICIENCY ANEMIA SECONDARY TO INADEQUATE DIETARY IRON INTAKE: ICD-10-CM

## 2023-03-10 DIAGNOSIS — K90.89 OTHER SPECIFIED INTESTINAL MALABSORPTION: ICD-10-CM

## 2023-03-10 LAB
BASOPHILS # BLD AUTO: 0.06 X10(3) UL (ref 0–0.2)
BASOPHILS NFR BLD AUTO: 0.9 %
DEPRECATED HBV CORE AB SER IA-ACNC: 203.6 NG/ML
DEPRECATED RDW RBC AUTO: 38.5 FL (ref 35.1–46.3)
EOSINOPHIL # BLD AUTO: 0.21 X10(3) UL (ref 0–0.7)
EOSINOPHIL NFR BLD AUTO: 3 %
ERYTHROCYTE [DISTWIDTH] IN BLOOD BY AUTOMATED COUNT: 13.6 % (ref 11–15)
HCT VFR BLD AUTO: 38.7 %
HGB BLD-MCNC: 12.5 G/DL
IMM GRANULOCYTES # BLD AUTO: 0.08 X10(3) UL (ref 0–1)
IMM GRANULOCYTES NFR BLD: 1.1 %
IRON SATN MFR SERPL: 10 %
IRON SERPL-MCNC: 30 UG/DL
LYMPHOCYTES # BLD AUTO: 1.58 X10(3) UL (ref 1–4)
LYMPHOCYTES NFR BLD AUTO: 22.6 %
MCH RBC QN AUTO: 25.1 PG (ref 26–34)
MCHC RBC AUTO-ENTMCNC: 32.3 G/DL (ref 31–37)
MCV RBC AUTO: 77.6 FL
MONOCYTES # BLD AUTO: 0.89 X10(3) UL (ref 0.1–1)
MONOCYTES NFR BLD AUTO: 12.7 %
NEUTROPHILS # BLD AUTO: 4.17 X10 (3) UL (ref 1.5–7.7)
NEUTROPHILS # BLD AUTO: 4.17 X10(3) UL (ref 1.5–7.7)
NEUTROPHILS NFR BLD AUTO: 59.7 %
PLATELET # BLD AUTO: 301 10(3)UL (ref 150–450)
RBC # BLD AUTO: 4.99 X10(6)UL
TIBC SERPL-MCNC: 313 UG/DL (ref 240–450)
TRANSFERRIN SERPL-MCNC: 210 MG/DL (ref 200–360)
WBC # BLD AUTO: 7 X10(3) UL (ref 4–11)

## 2023-03-10 PROCEDURE — 84466 ASSAY OF TRANSFERRIN: CPT

## 2023-03-10 PROCEDURE — 96372 THER/PROPH/DIAG INJ SC/IM: CPT

## 2023-03-10 PROCEDURE — 83540 ASSAY OF IRON: CPT

## 2023-03-10 PROCEDURE — 82728 ASSAY OF FERRITIN: CPT

## 2023-03-10 PROCEDURE — 85025 COMPLETE CBC W/AUTO DIFF WBC: CPT

## 2023-03-10 PROCEDURE — 96374 THER/PROPH/DIAG INJ IV PUSH: CPT

## 2023-03-10 RX ORDER — CYANOCOBALAMIN 1000 UG/ML
1000 INJECTION, SOLUTION INTRAMUSCULAR; SUBCUTANEOUS ONCE
Start: 2023-04-07 | End: 2023-04-07

## 2023-03-10 RX ORDER — CYANOCOBALAMIN 1000 UG/ML
INJECTION, SOLUTION INTRAMUSCULAR; SUBCUTANEOUS
Status: COMPLETED
Start: 2023-03-10 | End: 2023-03-10

## 2023-03-10 RX ORDER — CYANOCOBALAMIN 1000 UG/ML
1000 INJECTION, SOLUTION INTRAMUSCULAR; SUBCUTANEOUS ONCE
Status: COMPLETED | OUTPATIENT
Start: 2023-03-10 | End: 2023-03-10

## 2023-03-10 RX ADMIN — CYANOCOBALAMIN 1000 MCG: 1000 INJECTION, SOLUTION INTRAMUSCULAR; SUBCUTANEOUS at 16:15:00

## 2023-03-10 NOTE — PROGRESS NOTES
Presents for every 4 week venofer 200mg and b12 injection. Denies complaints. Labs drawn for iron studies and CBC prior to iron infusion. PIV started with good blood return. B12 given to L deltoid-tolerated well. Venofer given over 2 mins-tolerated well. no s/s of adverse reaction noted. Refused 30 min observation. Discharged stable. PIV dc'd. Aware of future appointments.

## 2023-03-31 ENCOUNTER — APPOINTMENT (OUTPATIENT)
Dept: HEMATOLOGY/ONCOLOGY | Facility: HOSPITAL | Age: 38
End: 2023-03-31
Attending: INTERNAL MEDICINE
Payer: MEDICAID

## 2023-04-07 ENCOUNTER — OFFICE VISIT (OUTPATIENT)
Dept: HEMATOLOGY/ONCOLOGY | Facility: HOSPITAL | Age: 38
End: 2023-04-07
Attending: INTERNAL MEDICINE
Payer: MEDICAID

## 2023-04-07 VITALS
OXYGEN SATURATION: 98 % | HEART RATE: 93 BPM | RESPIRATION RATE: 16 BRPM | DIASTOLIC BLOOD PRESSURE: 77 MMHG | TEMPERATURE: 98 F | SYSTOLIC BLOOD PRESSURE: 117 MMHG

## 2023-04-07 DIAGNOSIS — D50.8 IRON DEFICIENCY ANEMIA SECONDARY TO INADEQUATE DIETARY IRON INTAKE: ICD-10-CM

## 2023-04-07 DIAGNOSIS — D50.0 IRON DEFICIENCY ANEMIA DUE TO CHRONIC BLOOD LOSS: Primary | ICD-10-CM

## 2023-04-07 DIAGNOSIS — K90.89 OTHER SPECIFIED INTESTINAL MALABSORPTION: ICD-10-CM

## 2023-04-07 PROCEDURE — 96374 THER/PROPH/DIAG INJ IV PUSH: CPT

## 2023-04-07 PROCEDURE — 96372 THER/PROPH/DIAG INJ SC/IM: CPT

## 2023-04-07 RX ORDER — CYANOCOBALAMIN 1000 UG/ML
1000 INJECTION, SOLUTION INTRAMUSCULAR; SUBCUTANEOUS ONCE
Status: CANCELLED
Start: 2023-05-05 | End: 2023-05-05

## 2023-04-07 RX ORDER — CYANOCOBALAMIN 1000 UG/ML
INJECTION, SOLUTION INTRAMUSCULAR; SUBCUTANEOUS
Status: COMPLETED
Start: 2023-04-07 | End: 2023-04-07

## 2023-04-07 RX ORDER — CYANOCOBALAMIN 1000 UG/ML
1000 INJECTION, SOLUTION INTRAMUSCULAR; SUBCUTANEOUS ONCE
Status: COMPLETED | OUTPATIENT
Start: 2023-04-07 | End: 2023-04-07

## 2023-04-07 RX ADMIN — CYANOCOBALAMIN 1000 MCG: 1000 INJECTION, SOLUTION INTRAMUSCULAR; SUBCUTANEOUS at 16:17:00

## 2023-04-07 NOTE — PROGRESS NOTES
Odilon Phelps is here for monthly Vitamin B12 and Venofer 200mg. IV. He tolerated treatment well without any signs or symptoms of reaction noted. He refused to stay for the 30 minute observation period.

## 2023-04-28 ENCOUNTER — APPOINTMENT (OUTPATIENT)
Dept: HEMATOLOGY/ONCOLOGY | Facility: HOSPITAL | Age: 38
End: 2023-04-28
Attending: INTERNAL MEDICINE
Payer: MEDICAID

## 2023-05-04 RX ORDER — CYANOCOBALAMIN 1000 UG/ML
1000 INJECTION, SOLUTION INTRAMUSCULAR; SUBCUTANEOUS ONCE
Start: 2023-05-04 | End: 2023-05-04

## 2023-05-26 ENCOUNTER — OFFICE VISIT (OUTPATIENT)
Dept: HEMATOLOGY/ONCOLOGY | Facility: HOSPITAL | Age: 38
End: 2023-05-26
Attending: INTERNAL MEDICINE
Payer: MEDICAID

## 2023-05-26 VITALS
SYSTOLIC BLOOD PRESSURE: 109 MMHG | DIASTOLIC BLOOD PRESSURE: 67 MMHG | OXYGEN SATURATION: 100 % | TEMPERATURE: 98 F | HEART RATE: 84 BPM | RESPIRATION RATE: 12 BRPM

## 2023-05-26 DIAGNOSIS — D50.0 IRON DEFICIENCY ANEMIA DUE TO CHRONIC BLOOD LOSS: Primary | ICD-10-CM

## 2023-05-26 DIAGNOSIS — D50.8 IRON DEFICIENCY ANEMIA SECONDARY TO INADEQUATE DIETARY IRON INTAKE: ICD-10-CM

## 2023-05-26 DIAGNOSIS — K90.89 OTHER SPECIFIED INTESTINAL MALABSORPTION: ICD-10-CM

## 2023-05-26 PROCEDURE — 96372 THER/PROPH/DIAG INJ SC/IM: CPT

## 2023-05-26 PROCEDURE — 96374 THER/PROPH/DIAG INJ IV PUSH: CPT

## 2023-05-26 RX ORDER — CYANOCOBALAMIN 1000 UG/ML
1000 INJECTION, SOLUTION INTRAMUSCULAR; SUBCUTANEOUS ONCE
Start: 2023-06-02 | End: 2023-06-02

## 2023-05-26 RX ORDER — CYANOCOBALAMIN 1000 UG/ML
1000 INJECTION, SOLUTION INTRAMUSCULAR; SUBCUTANEOUS ONCE
Status: COMPLETED | OUTPATIENT
Start: 2023-05-26 | End: 2023-05-26

## 2023-05-26 RX ORDER — CYANOCOBALAMIN 1000 UG/ML
INJECTION, SOLUTION INTRAMUSCULAR; SUBCUTANEOUS
Status: COMPLETED
Start: 2023-05-26 | End: 2023-05-26

## 2023-05-26 RX ADMIN — CYANOCOBALAMIN 1000 MCG: 1000 INJECTION, SOLUTION INTRAMUSCULAR; SUBCUTANEOUS at 13:24:00

## 2023-05-26 NOTE — PROGRESS NOTES
Patient to center for monthly B12 and Venofer 200mg    Arrives ambulatory and without complaints    Venofer administered , tolerated well, no adverse reactions      Declined observation     B 12 given , 2 x 2 and band aid to site    discharged stable, aware of future visits

## 2023-06-14 RX ORDER — INSULIN GLARGINE 100 [IU]/ML
INJECTION, SOLUTION SUBCUTANEOUS
Qty: 15 ML | Refills: 0 | Status: SHIPPED | OUTPATIENT
Start: 2023-06-14

## 2023-06-14 NOTE — TELEPHONE ENCOUNTER
LOV;  03/02/23    RTC;6weeks    F/U;No FU     Pending Monthly Supply;order pending, approve if appropriate.

## 2023-06-15 RX ORDER — INSULIN LISPRO 100 [IU]/ML
INJECTION, SOLUTION INTRAVENOUS; SUBCUTANEOUS
Qty: 15 ML | Refills: 0 | Status: SHIPPED | OUTPATIENT
Start: 2023-06-15

## 2023-06-23 ENCOUNTER — OFFICE VISIT (OUTPATIENT)
Dept: HEMATOLOGY/ONCOLOGY | Facility: HOSPITAL | Age: 38
End: 2023-06-23
Attending: INTERNAL MEDICINE
Payer: MEDICAID

## 2023-06-23 VITALS
RESPIRATION RATE: 16 BRPM | HEART RATE: 95 BPM | TEMPERATURE: 98 F | OXYGEN SATURATION: 100 % | SYSTOLIC BLOOD PRESSURE: 107 MMHG | DIASTOLIC BLOOD PRESSURE: 62 MMHG

## 2023-06-23 DIAGNOSIS — K90.89 OTHER SPECIFIED INTESTINAL MALABSORPTION: ICD-10-CM

## 2023-06-23 DIAGNOSIS — D50.8 IRON DEFICIENCY ANEMIA SECONDARY TO INADEQUATE DIETARY IRON INTAKE: ICD-10-CM

## 2023-06-23 DIAGNOSIS — D50.0 IRON DEFICIENCY ANEMIA DUE TO CHRONIC BLOOD LOSS: Primary | ICD-10-CM

## 2023-06-23 PROCEDURE — 96374 THER/PROPH/DIAG INJ IV PUSH: CPT

## 2023-06-23 PROCEDURE — 96372 THER/PROPH/DIAG INJ SC/IM: CPT

## 2023-06-23 RX ORDER — CYANOCOBALAMIN 1000 UG/ML
INJECTION, SOLUTION INTRAMUSCULAR; SUBCUTANEOUS
Status: COMPLETED
Start: 2023-06-23 | End: 2023-06-23

## 2023-06-23 RX ORDER — CYANOCOBALAMIN 1000 UG/ML
1000 INJECTION, SOLUTION INTRAMUSCULAR; SUBCUTANEOUS ONCE
Status: COMPLETED | OUTPATIENT
Start: 2023-06-23 | End: 2023-06-23

## 2023-06-23 RX ORDER — CYANOCOBALAMIN 1000 UG/ML
1000 INJECTION, SOLUTION INTRAMUSCULAR; SUBCUTANEOUS ONCE
Start: 2023-07-21 | End: 2023-07-21

## 2023-06-23 RX ADMIN — CYANOCOBALAMIN 1000 MCG: 1000 INJECTION, SOLUTION INTRAMUSCULAR; SUBCUTANEOUS at 16:46:00

## 2023-06-23 NOTE — PROGRESS NOTES
Presents for every 4 week venofer 200mg and b12 injection. Offers no complaints. PIV started with good blood return. Venofer Given slow IVP via side port of a free flowing bag of 0.9NS. B12 given to Right deltoid-tolerated well. Refused 30 min observation. Discharged stable. Aware of future appointments. Pt requesting standing order labs per Dr Deanne Johnson be drawn at next appt.

## 2023-07-11 RX ORDER — PROCHLORPERAZINE 25 MG/1
SUPPOSITORY RECTAL
Qty: 1 EACH | Refills: 0 | Status: SHIPPED | OUTPATIENT
Start: 2023-07-11

## 2023-07-21 ENCOUNTER — APPOINTMENT (OUTPATIENT)
Dept: HEMATOLOGY/ONCOLOGY | Facility: HOSPITAL | Age: 38
End: 2023-07-21
Attending: INTERNAL MEDICINE
Payer: MEDICAID

## 2023-08-04 ENCOUNTER — OFFICE VISIT (OUTPATIENT)
Dept: HEMATOLOGY/ONCOLOGY | Facility: HOSPITAL | Age: 38
End: 2023-08-04
Attending: INTERNAL MEDICINE
Payer: MEDICAID

## 2023-08-04 VITALS
TEMPERATURE: 98 F | DIASTOLIC BLOOD PRESSURE: 65 MMHG | RESPIRATION RATE: 16 BRPM | HEART RATE: 94 BPM | SYSTOLIC BLOOD PRESSURE: 105 MMHG | OXYGEN SATURATION: 98 %

## 2023-08-04 DIAGNOSIS — D50.8 IRON DEFICIENCY ANEMIA SECONDARY TO INADEQUATE DIETARY IRON INTAKE: Primary | ICD-10-CM

## 2023-08-04 DIAGNOSIS — D50.0 IRON DEFICIENCY ANEMIA DUE TO CHRONIC BLOOD LOSS: ICD-10-CM

## 2023-08-04 DIAGNOSIS — K90.89 OTHER SPECIFIED INTESTINAL MALABSORPTION: ICD-10-CM

## 2023-08-04 DIAGNOSIS — E53.8 B12 DEFICIENCY: ICD-10-CM

## 2023-08-04 LAB
BASOPHILS # BLD AUTO: 0.06 X10(3) UL (ref 0–0.2)
BASOPHILS NFR BLD AUTO: 1 %
DEPRECATED HBV CORE AB SER IA-ACNC: 212.6 NG/ML
DEPRECATED RDW RBC AUTO: 34.8 FL (ref 35.1–46.3)
EOSINOPHIL # BLD AUTO: 0.17 X10(3) UL (ref 0–0.7)
EOSINOPHIL NFR BLD AUTO: 2.8 %
ERYTHROCYTE [DISTWIDTH] IN BLOOD BY AUTOMATED COUNT: 13.5 % (ref 11–15)
HCT VFR BLD AUTO: 35.4 %
HGB BLD-MCNC: 11.4 G/DL
IMM GRANULOCYTES # BLD AUTO: 0.06 X10(3) UL (ref 0–1)
IMM GRANULOCYTES NFR BLD: 1 %
IRON SATN MFR SERPL: 13 %
IRON SERPL-MCNC: 44 UG/DL
LYMPHOCYTES # BLD AUTO: 1.69 X10(3) UL (ref 1–4)
LYMPHOCYTES NFR BLD AUTO: 27.6 %
MCH RBC QN AUTO: 23 PG (ref 26–34)
MCHC RBC AUTO-ENTMCNC: 32.2 G/DL (ref 31–37)
MCV RBC AUTO: 71.5 FL
MONOCYTES # BLD AUTO: 0.55 X10(3) UL (ref 0.1–1)
MONOCYTES NFR BLD AUTO: 9 %
NEUTROPHILS # BLD AUTO: 3.59 X10 (3) UL (ref 1.5–7.7)
NEUTROPHILS # BLD AUTO: 3.59 X10(3) UL (ref 1.5–7.7)
NEUTROPHILS NFR BLD AUTO: 58.6 %
PLATELET # BLD AUTO: 343 10(3)UL (ref 150–450)
RBC # BLD AUTO: 4.95 X10(6)UL
TIBC SERPL-MCNC: 331 UG/DL (ref 240–450)
TRANSFERRIN SERPL-MCNC: 222 MG/DL (ref 200–360)
WBC # BLD AUTO: 6.1 X10(3) UL (ref 4–11)

## 2023-08-04 PROCEDURE — 83540 ASSAY OF IRON: CPT

## 2023-08-04 PROCEDURE — 96372 THER/PROPH/DIAG INJ SC/IM: CPT

## 2023-08-04 PROCEDURE — 82728 ASSAY OF FERRITIN: CPT

## 2023-08-04 PROCEDURE — 84466 ASSAY OF TRANSFERRIN: CPT

## 2023-08-04 PROCEDURE — 96374 THER/PROPH/DIAG INJ IV PUSH: CPT

## 2023-08-04 PROCEDURE — 85025 COMPLETE CBC W/AUTO DIFF WBC: CPT

## 2023-08-04 RX ORDER — CYANOCOBALAMIN 1000 UG/ML
1000 INJECTION, SOLUTION INTRAMUSCULAR; SUBCUTANEOUS ONCE
Status: COMPLETED | OUTPATIENT
Start: 2023-08-04 | End: 2023-08-04

## 2023-08-04 RX ORDER — CYANOCOBALAMIN 1000 UG/ML
INJECTION, SOLUTION INTRAMUSCULAR; SUBCUTANEOUS
Status: COMPLETED
Start: 2023-08-04 | End: 2023-08-04

## 2023-08-04 RX ORDER — CYANOCOBALAMIN 1000 UG/ML
1000 INJECTION, SOLUTION INTRAMUSCULAR; SUBCUTANEOUS ONCE
Start: 2023-08-18 | End: 2023-08-18

## 2023-08-04 RX ORDER — CYANOCOBALAMIN 1000 UG/ML
1000 INJECTION, SOLUTION INTRAMUSCULAR; SUBCUTANEOUS ONCE
Start: 2023-08-04 | End: 2023-08-04

## 2023-08-04 RX ORDER — CYANOCOBALAMIN 1000 UG/ML
1000 INJECTION, SOLUTION INTRAMUSCULAR; SUBCUTANEOUS ONCE
Start: 2023-09-01 | End: 2023-09-01

## 2023-08-04 RX ADMIN — CYANOCOBALAMIN 1000 MCG: 1000 INJECTION, SOLUTION INTRAMUSCULAR; SUBCUTANEOUS at 16:27:00

## 2023-08-04 NOTE — PROGRESS NOTES
Patient arrives for monthly venofer and B12 injection. Reports he is well. B12 given in left deltoid, patient tolerated well. PIV started in left AC, positive blood return noted. Labs collected PIV start. Venofer given slowly over 3 minutes, patient tolerated well. PIV removed, site covered with 2x2 and coban. Discharged ambulating independently with future appointments scheduled.

## 2023-08-14 ENCOUNTER — IMMUNIZATION (OUTPATIENT)
Dept: LAB | Age: 38
End: 2023-08-14
Attending: EMERGENCY MEDICINE
Payer: MEDICAID

## 2023-08-14 ENCOUNTER — TELEPHONE (OUTPATIENT)
Dept: ENDOCRINOLOGY CLINIC | Facility: CLINIC | Age: 38
End: 2023-08-14

## 2023-08-14 ENCOUNTER — OFFICE VISIT (OUTPATIENT)
Dept: ENDOCRINOLOGY CLINIC | Facility: CLINIC | Age: 38
End: 2023-08-14

## 2023-08-14 VITALS
SYSTOLIC BLOOD PRESSURE: 113 MMHG | WEIGHT: 149 LBS | DIASTOLIC BLOOD PRESSURE: 76 MMHG | BODY MASS INDEX: 23 KG/M2 | HEART RATE: 97 BPM

## 2023-08-14 DIAGNOSIS — Z23 NEED FOR VACCINATION: Primary | ICD-10-CM

## 2023-08-14 DIAGNOSIS — Z79.4 TYPE 2 DIABETES MELLITUS WITH HYPERGLYCEMIA, WITH LONG-TERM CURRENT USE OF INSULIN (HCC): Primary | ICD-10-CM

## 2023-08-14 DIAGNOSIS — E11.65 TYPE 2 DIABETES MELLITUS WITH HYPERGLYCEMIA, WITH LONG-TERM CURRENT USE OF INSULIN (HCC): Primary | ICD-10-CM

## 2023-08-14 LAB
CARTRIDGE LOT#: ABNORMAL NUMERIC
GLUCOSE BLOOD: 227
HEMOGLOBIN A1C: 10.4 % (ref 4.3–5.6)
TEST STRIP LOT #: NORMAL NUMERIC

## 2023-08-14 PROCEDURE — 83036 HEMOGLOBIN GLYCOSYLATED A1C: CPT | Performed by: NURSE PRACTITIONER

## 2023-08-14 PROCEDURE — 99214 OFFICE O/P EST MOD 30 MIN: CPT | Performed by: NURSE PRACTITIONER

## 2023-08-14 PROCEDURE — 3074F SYST BP LT 130 MM HG: CPT | Performed by: NURSE PRACTITIONER

## 2023-08-14 PROCEDURE — 3046F HEMOGLOBIN A1C LEVEL >9.0%: CPT | Performed by: NURSE PRACTITIONER

## 2023-08-14 PROCEDURE — 0121A SARSCOV2 VAC BVL 30MCG/0.3ML: CPT

## 2023-08-14 PROCEDURE — 82947 ASSAY GLUCOSE BLOOD QUANT: CPT | Performed by: NURSE PRACTITIONER

## 2023-08-14 PROCEDURE — 3078F DIAST BP <80 MM HG: CPT | Performed by: NURSE PRACTITIONER

## 2023-08-14 RX ORDER — INSULIN DETEMIR 100 [IU]/ML
24 INJECTION, SOLUTION SUBCUTANEOUS DAILY
Qty: 9 ML | Refills: 2 | Status: SHIPPED | OUTPATIENT
Start: 2023-08-14

## 2023-08-14 RX ORDER — INSULIN LISPRO 100 [IU]/ML
INJECTION, SOLUTION INTRAVENOUS; SUBCUTANEOUS
Qty: 9 ML | Refills: 2 | Status: SHIPPED | OUTPATIENT
Start: 2023-08-14

## 2023-08-14 NOTE — TELEPHONE ENCOUNTER
Patient transitioning to omniopod 5 insulin pump. Please start PA for this as patient has medicaid insurance plan. Thank you!

## 2023-08-14 NOTE — PATIENT INSTRUCTIONS
A1C: 10.4% today; previously was 10.6% on 3/2/2023  Blood glucose: 227 in clinic today    Medications:   -continue with Glipizide 10mg twice daily   -continue with Metformin 1,000mg twice daily   -increase Levemir 18--> 24 units once nightly   -continue with Humalo units with breakfast + sliding scale       4 units with snack (around lunch time) + sliding scale        8 units with dinner + sliding scale     Sliding scale   If your blood glucose reading is 151- 200= +1 units       201-250= +2 units      251-300= +3 units      301-350= +4 units --> call clinic to notify us       351-400= +5 units    A1C goal:  <7.0%    Blood sugar testing:  Start using chely 2 continuous glucometer for 14 days, then transition to Dexcom G6    Blood sugar targets:  Before breakfast:   (preferably < 110)  Before meals OR 2 hours after meals: <180 (preferably <150)     Call for persistent blood sugars < 75 or > 200.

## 2023-08-17 NOTE — TELEPHONE ENCOUNTER
Received approval fax from [facility] for [address] Effective 5/18/23-8/16/24. Case #- [id number]      Sent to scanning. "[employer] message sent to pt.

## 2023-08-23 ENCOUNTER — APPOINTMENT (OUTPATIENT)
Dept: HEMATOLOGY/ONCOLOGY | Facility: HOSPITAL | Age: 38
End: 2023-08-23
Attending: INTERNAL MEDICINE
Payer: MEDICAID

## 2023-09-08 ENCOUNTER — OFFICE VISIT (OUTPATIENT)
Dept: HEMATOLOGY/ONCOLOGY | Facility: HOSPITAL | Age: 38
End: 2023-09-08
Attending: INTERNAL MEDICINE
Payer: MEDICAID

## 2023-09-08 VITALS
DIASTOLIC BLOOD PRESSURE: 71 MMHG | TEMPERATURE: 98 F | HEART RATE: 79 BPM | SYSTOLIC BLOOD PRESSURE: 105 MMHG | RESPIRATION RATE: 16 BRPM | OXYGEN SATURATION: 100 %

## 2023-09-08 VITALS
HEIGHT: 67 IN | OXYGEN SATURATION: 100 % | TEMPERATURE: 98 F | RESPIRATION RATE: 16 BRPM | SYSTOLIC BLOOD PRESSURE: 105 MMHG | DIASTOLIC BLOOD PRESSURE: 71 MMHG | WEIGHT: 142 LBS | HEART RATE: 79 BPM | BODY MASS INDEX: 22.29 KG/M2

## 2023-09-08 DIAGNOSIS — D50.8 IRON DEFICIENCY ANEMIA SECONDARY TO INADEQUATE DIETARY IRON INTAKE: Primary | ICD-10-CM

## 2023-09-08 DIAGNOSIS — K90.9 IRON MALABSORPTION: ICD-10-CM

## 2023-09-08 DIAGNOSIS — D50.0 IRON DEFICIENCY ANEMIA DUE TO CHRONIC BLOOD LOSS: Primary | ICD-10-CM

## 2023-09-08 DIAGNOSIS — E53.8 B12 DEFICIENCY: ICD-10-CM

## 2023-09-08 DIAGNOSIS — K90.89 OTHER SPECIFIED INTESTINAL MALABSORPTION: ICD-10-CM

## 2023-09-08 DIAGNOSIS — E55.9 VITAMIN D DEFICIENCY: ICD-10-CM

## 2023-09-08 DIAGNOSIS — D50.0 IRON DEFICIENCY ANEMIA DUE TO CHRONIC BLOOD LOSS: ICD-10-CM

## 2023-09-08 PROCEDURE — 96374 THER/PROPH/DIAG INJ IV PUSH: CPT

## 2023-09-08 PROCEDURE — 96372 THER/PROPH/DIAG INJ SC/IM: CPT

## 2023-09-08 PROCEDURE — 99214 OFFICE O/P EST MOD 30 MIN: CPT | Performed by: INTERNAL MEDICINE

## 2023-09-08 RX ORDER — CYANOCOBALAMIN 1000 UG/ML
1000 INJECTION, SOLUTION INTRAMUSCULAR; SUBCUTANEOUS ONCE
Start: 2023-09-29 | End: 2023-09-29

## 2023-09-08 RX ORDER — CYANOCOBALAMIN 1000 UG/ML
INJECTION, SOLUTION INTRAMUSCULAR; SUBCUTANEOUS
Status: COMPLETED
Start: 2023-09-08 | End: 2023-09-08

## 2023-09-08 RX ORDER — CYANOCOBALAMIN 1000 UG/ML
1000 INJECTION, SOLUTION INTRAMUSCULAR; SUBCUTANEOUS ONCE
Status: COMPLETED | OUTPATIENT
Start: 2023-09-08 | End: 2023-09-08

## 2023-09-08 RX ADMIN — CYANOCOBALAMIN 1000 MCG: 1000 INJECTION, SOLUTION INTRAMUSCULAR; SUBCUTANEOUS at 13:56:00

## 2023-09-08 NOTE — PROGRESS NOTES
Presents for every 4 week venofer 200mg and b12 injection. Offers no complaints. PIV started with good blood return. Venofer Given slow IVP via side port of a free flowing bag of 0.9NS. B12 given to Right deltoid-tolerated well. Refused 30 min observation. Discharged stable. Aware of future appointments.

## 2023-09-11 ENCOUNTER — TELEPHONE (OUTPATIENT)
Dept: ENDOCRINOLOGY CLINIC | Facility: CLINIC | Age: 38
End: 2023-09-11

## 2023-09-11 RX ORDER — PROCHLORPERAZINE 25 MG/1
SUPPOSITORY RECTAL
Qty: 9 EACH | Refills: 0 | Status: SHIPPED | OUTPATIENT
Start: 2023-09-11

## 2023-09-11 RX ORDER — PROCHLORPERAZINE 25 MG/1
SUPPOSITORY RECTAL
Qty: 1 EACH | Refills: 0 | Status: SHIPPED | OUTPATIENT
Start: 2023-09-11

## 2023-09-11 NOTE — TELEPHONE ENCOUNTER
LOV: 8/14/23    RTC 2 months  Refilled per protocol.   Appointment reminder sent via [facility]

## 2023-09-11 NOTE — TELEPHONE ENCOUNTER
Patient requesting refills for Dexcom G6 sensors. Please call. Thank you.     Current Outpatient Medications   Medication Sig Dispense Refill    Continuous Blood Gluc Sensor (DEXCOM G6 SENSOR) Does not apply Misc Change sensor every 10 days 3 each 2

## 2023-09-12 NOTE — TELEPHONE ENCOUNTER
Called and spoke to patient, appointment booked  for 9/19/23. Patient stated he currently is on a [address] and will be transitioning to the Dexcom G6.  He also is on an Omnipod pump

## 2023-09-14 ENCOUNTER — TELEPHONE (OUTPATIENT)
Dept: ENDOCRINOLOGY CLINIC | Facility: CLINIC | Age: 38
End: 2023-09-14

## 2023-09-18 NOTE — PROGRESS NOTES
Dexcom Start    Start Time: 11:07am  End Time: 12:08pm    Indication: Pt is T2D followed by Ari Nunez. Pt was approved for Omnipod5 and Dexcom G6. Pt was previously on RxRevu system. Appointment today for Dexcom G6 start. A1C: 10.4% (8/14/2023)    Current Diabetes Medication:  Levemir 18 unit daily (PM)  Humalog 14 units with meals   Glipizide 10mg BID  Metformin 1000mg BID    Supplies used: Pt's    Sensor placed on:  Abdomen with over patch (colostomy bag on abdomen)    Reviewed the following topics:  Functions of the sensor, transmitter, phone/, and overlay patches  Test blood glucose if symptoms do not match sensor reading. How to handle problems like MRI, CT scans, travel, etc.  Explained how to enter sensor code q 10 days and transmitter ID q 3 months  Showed pt how to change high/low alert as well as to see when sensor expires  Reviewed how to remove/separate transmitter from sensor in 10 days  Reviewed sensor expiration alerts 6 hours, 2 hours, 30 minutes and option to end sensor session early and why patient may want to end sensor before it expires. Will get notified of transmitter expiration as well. Instructed pt not to inject insulin within 3 inches of sensor  Instructed pt they will have to return to fingerstick BG testing temporarily if out of sensors/reader/transmitter. Reviewed packing/supplies    Phone:  - Mike Manuel on phone and connected to sensor  - [id number] on phone and connected to clinic    Plan:  - Started [address] while in office; connected to clinic  - Reviewed Omnipod 5 system and function  - Referral placed for [facility] to get trained on Omnipod 5 system  - Pt currently on Prednisone 40mg daily and will transition to 20mg daily starting today for at least 1month.  Reviewed BG's with Luis Eduardo Puga and advised:   --> Increase Levemir 18 --> 24 units daily   --> Continue other medication regimen   --> Update on Monday  - Rx sent in for new meter, test strips, and lancets for glucose meter    Updated Diabetes Medication:  Levemir 18 unit daily (PM)  Humalog 14 units with meals   Glipizide 10mg BID  Metformin BID    Follow Up:  Monday with BG update  11/21/2023 Arpita

## 2023-09-19 ENCOUNTER — NURSE ONLY (OUTPATIENT)
Dept: ENDOCRINOLOGY CLINIC | Facility: CLINIC | Age: 38
End: 2023-09-19

## 2023-09-19 DIAGNOSIS — Z79.4 TYPE 2 DIABETES MELLITUS WITH HYPERGLYCEMIA, WITH LONG-TERM CURRENT USE OF INSULIN (HCC): Primary | ICD-10-CM

## 2023-09-19 DIAGNOSIS — E11.65 TYPE 2 DIABETES MELLITUS WITH HYPERGLYCEMIA, WITH LONG-TERM CURRENT USE OF INSULIN (HCC): Primary | ICD-10-CM

## 2023-09-19 PROCEDURE — 99211 OFF/OP EST MAY X REQ PHY/QHP: CPT | Performed by: NURSE PRACTITIONER

## 2023-09-19 RX ORDER — BLOOD SUGAR DIAGNOSTIC
STRIP MISCELLANEOUS
Qty: 300 EACH | Refills: 1 | Status: SHIPPED | OUTPATIENT
Start: 2023-09-19

## 2023-09-19 RX ORDER — BLOOD-GLUCOSE METER
EACH MISCELLANEOUS
Qty: 1 KIT | Refills: 0 | Status: SHIPPED | OUTPATIENT
Start: 2023-09-19

## 2023-09-19 RX ORDER — LANCETS 33 GAUGE
EACH MISCELLANEOUS
Qty: 300 EACH | Refills: 1 | Status: SHIPPED | OUTPATIENT
Start: 2023-09-19

## 2023-09-19 NOTE — PATIENT INSTRUCTIONS
Diabetes Medication  Levemir 24 units daily (PM)  Humalog 14 units with meals  Glipizide 10mg twice daily  Metformin 1000mg twice daily    Blood Sugar Monitoring  Started Dexcom G6 in office    Other  - Update on Monday with blood sugars  - Call the [address] at [phone] to schedule your Omnipod 5 start

## 2023-09-20 RX ORDER — INSULIN GLARGINE 100 [IU]/ML
14 INJECTION, SOLUTION SUBCUTANEOUS DAILY
Qty: 15 ML | Refills: 0 | Status: SHIPPED | OUTPATIENT
Start: 2023-09-20

## 2023-10-06 ENCOUNTER — TELEPHONE (OUTPATIENT)
Dept: ENDOCRINOLOGY | Facility: HOSPITAL | Age: 38
End: 2023-10-06

## 2023-10-06 NOTE — TELEPHONE ENCOUNTER
Contacted patient to schedule insulin pump start. He stated he does not have prescription for rapid acting insulin vial and this will be needed for pump therapy. He also stated he does not have a refill on his long acting insulin-pump start date is scheduled for 10/24-He only has1 long acting insulin pen left and is worried he will run out prior to appointment.   Thank you

## 2023-10-09 ENCOUNTER — OFFICE VISIT (OUTPATIENT)
Dept: DERMATOLOGY CLINIC | Facility: CLINIC | Age: 38
End: 2023-10-09

## 2023-10-09 DIAGNOSIS — L21.9 SEBORRHEIC DERMATITIS: Primary | ICD-10-CM

## 2023-10-09 PROCEDURE — 99204 OFFICE O/P NEW MOD 45 MIN: CPT | Performed by: STUDENT IN AN ORGANIZED HEALTH CARE EDUCATION/TRAINING PROGRAM

## 2023-10-09 RX ORDER — KETOCONAZOLE 20 MG/ML
SHAMPOO TOPICAL
Qty: 120 ML | Refills: 11 | Status: SHIPPED | OUTPATIENT
Start: 2023-10-09

## 2023-10-09 NOTE — PROGRESS NOTES
October 9, 2023    New patient     CHIEF COMPLAINT: scalp    HISTORY OF PRESENT ILLNESS: .    1. Dry skin  Location: Scalp and face   Duration: 1 month  Signs and symptoms: Dry and Flaky  Current treatment: OTC  Past treatments:     DERM HISTORY:  History of skin cancer: No  History of chronic skin disease/condition: No    FAMILY HISTORY:  History of melanoma: No  History of chronic skin disease/condition: No    History/Other:    REVIEW OF SYSTEMS:  Constitutional: Denies fever, chills, unintentional weight loss. Skin as per HPI    PAST MEDICAL HISTORY:  Past Medical History:   Diagnosis Date    Asthma, cough variant     Calculus of kidney     Crohn disease ([facility]     Crohn's disease ([facility])     Diabetes ([facility]     Iron deficiency anemia        Medications  Current Outpatient Medications   Medication Sig Dispense Refill    insulin glargine (LANTUS SOLOSTAR) 100 UNIT/ML Subcutaneous Solution Pen-injector Inject 14 Units into the skin daily. 15 mL 0    ONETOUCH ULTRA In Vitro Strip Check blood sugar up to 3x daily 300 each 1    Blood Glucose Monitoring Suppl (ONETOUCH ULTRA 2) w/Device Does not apply Kit Use to check blood sugar 3x daily 1 kit 0    OneTouch Delica Lancets 94R Does not apply Misc Use to check blood sugar 3x daily 300 each 1    Continuous Blood Gluc Transmit (DEXCOM G6 TRANSMITTER) Does not apply Misc TEST BLOOD GLUCOSE AS DIRECTED WITH DEXCOM G6 SENSOR 1 each 0    Continuous Blood Gluc Sensor (DEXCOM G6 SENSOR) Does not apply Misc Change sensor every 10 days 9 each 0    Insulin Lispro, 1 Unit Dial, (HUMALOG KWIKPEN) 100 UNIT/ML Subcutaneous Solution Pen-injector Inject 8 units with each meal plus sliding scale. Max daily dose 33 units. 9 mL 2    insulin detemir (LEVEMIR FLEXTOUCH) 100 UNIT/ML Subcutaneous Solution Pen-injector Inject 24 Units into the skin daily. 9 mL 2    DEXCOM G6  Does not apply Device 1 Device daily.  (Patient not taking: Reported on 8/14/2023) 1 each 0    Insulin Disposable Pump (OMNIPOD 5 G6 INTRO, GEN 5,) Does not apply Kit 1 each As Directed. 1 kit 0    Insulin Disposable Pump (OMNIPOD 5 G6 POD, GEN 5,) Does not apply Misc 1 each every 3 (three) days. 6 each 0    predniSONE 20 MG Oral Tab Take 2 tablets (40 mg total) by mouth daily. 28 tablet 0    Insulin Pen Needle (TRUEPLUS 5-BEVEL PEN NEEDLES) 32G X 4 MM Does not apply Misc USE TO INJECT INSULIN UP TO THREE TIMES DAILY 100 each 0    montelukast 10 MG Oral Tab Take 1 tablet (10 mg total) by mouth every morning. Vitamin D3, Cholecalciferol, (VITAMIN D3) 125 MCG (5000 UT) Oral Cap Take 1 capsule (5,000 Units total) by mouth daily. STELARA 90 MG/ML Subcutaneous Solution Prefilled Syringe injection Inject into the skin every 3 (three) months. Takes every 2 months subcut      metFORMIN HCl 1000 MG Oral Tab Take 1 tablet (1,000 mg total) by mouth 2 (two) times daily. glipiZIDE 10 MG Oral Tab Take 1 tablet (10 mg total) by mouth 2 (two) times daily. Objective:    PHYSICAL EXAM:  General: awake, alert, no acute distress  Skin: Skin exam was performed today including the following: scalp. Pertinent findings include:   -  face and scalp clear today. Photos with erythema and scaling    ASSESSMENT & PLAN:  Pathophysiology of diagnoses discussed with patient. Therapeutic options reviewed. Risks, benefits, and alternatives discussed with patient. Instructions reviewed at length. #Seborrheic dermatitis  - Ketoconazole 2% shampoo. Use 3 times weekly. Lather into scalp and leave on for 5 minutes before washing off. You may use your regular shampoo or head and shoulders to wash your hair afterwards if desired. -  Start hydrocortisone 2.5% to affected areas on face up to twice daily Monday-Friday Take weekends off.      Return to clinic: 3 months or sooner if something concerning arises     Cuca Tee MD

## 2023-10-10 RX ORDER — INSULIN LISPRO 100 [IU]/ML
INJECTION, SOLUTION INTRAVENOUS; SUBCUTANEOUS
Qty: 70 ML | Refills: 0 | Status: SHIPPED | OUTPATIENT
Start: 2023-10-10

## 2023-10-10 RX ORDER — INSULIN DETEMIR 100 [IU]/ML
24 INJECTION, SOLUTION SUBCUTANEOUS DAILY
Qty: 6 ML | Refills: 0 | Status: SHIPPED | OUTPATIENT
Start: 2023-10-10

## 2023-10-10 RX ORDER — INSULIN LISPRO 100 [IU]/ML
INJECTION, SOLUTION INTRAVENOUS; SUBCUTANEOUS
Qty: 70 ML | Refills: 0 | Status: SHIPPED | OUTPATIENT
Start: 2023-10-10 | End: 2023-10-10

## 2023-10-10 NOTE — TELEPHONE ENCOUNTER
Marina Monson noted. Refill for levemir sent to pharmacy. RN staff, please notify patient that this has been sent to pharmacy. New rx for Humalog vial also sent to pharmacy. Thank you!

## 2023-10-10 NOTE — TELEPHONE ENCOUNTER
Pt is requesting refill for the following medication        insulin lispro (HUMALOG) 100 UNIT/ML Injection Solution, Inject via insulin pump. Max daily dose 80 units. , Disp: 70 mL, Rfl: 0

## 2023-10-10 NOTE — TELEPHONE ENCOUNTER
Called patient and relayed below message as outlined below. Pt will call pharmacy today to get  information. Pt denied further questions at this time.

## 2023-10-12 ENCOUNTER — HOSPITAL ENCOUNTER (OUTPATIENT)
Dept: ENDOCRINOLOGY | Facility: HOSPITAL | Age: 38
Discharge: HOME | End: 2023-10-12
Attending: NURSE PRACTITIONER
Payer: MEDICAID

## 2023-10-12 DIAGNOSIS — E11.9 TYPE 2 DIABETES MELLITUS WITHOUT RETINOPATHY (HCC): Primary | ICD-10-CM

## 2023-10-12 NOTE — PROGRESS NOTES
Samuel Merida  : 1985 was seen for Initial Individual Pre-Pump Education:    Date: 10/12/2023   Start time: 2:30 pm End time: 3:40 pm    Assessment:    Counts carbs accurately?: No  Per patient all meals are home cooked, therefore he guesstimates carbohydrates. Using carb to insulin ratio correctly?: No Patient  uses 8 units foe small meals and 10 units for large meals as reported by patient. Correctly using alternate method of matching mealtime insulin to food?: Yes  Using correction factor/ISF correctly? Yes  Monitoring sugar with CGM or via blood sugar meter at least QID? Yes  Testing at least QID? Yes  Keeping detailed records? Yes Patient uses the Dexcom G6  Aware of proper troubleshooting for hypo - and hyperglycemia? Yes    Teaching Content/Education:     Reviewed carb counting or alternate method such as carb presets/fixed grams per meal.   Reviewed carb to insulin ratio  Reviewed correction factor/ISF  Reminded to test QID or review/scan blood glucose QID  Reminded to keep records and bring to pump initiation  Taught pre-pump guidelines   Advised [facility] to use vendor training resources    Patient Goals/Recommendations: Patient chosen goals included in patient's instructions for today. Pump Initiation scheduled for 10/24/23. Patient verbalized understanding and has no further questions at this time.     Landon Grace RN

## 2023-10-13 NOTE — ADDENDUM NOTE
Encounter addended by: Ellie Hoffmann RN on: 10/13/2023 9:50 AM   Actions taken: Charge Capture section accepted

## 2023-10-24 ENCOUNTER — HOSPITAL ENCOUNTER (OUTPATIENT)
Dept: ENDOCRINOLOGY | Facility: HOSPITAL | Age: 38
Discharge: HOME | End: 2023-10-24
Attending: NURSE PRACTITIONER

## 2023-10-24 DIAGNOSIS — E11.9 TYPE 2 DIABETES MELLITUS WITHOUT RETINOPATHY (HCC): Primary | ICD-10-CM

## 2023-10-24 NOTE — PROGRESS NOTES
Rogers Vera  : 1985 was seen for Initial Individual Pump Start Education:    Date: 10/24/2023  Referring Provider: Raffi Wolff    Start time: 1310 End time: 1510    Assessment:   Pt ready to begin on his OP5. He is concerned about placement as he is trying to avoid his abdomen due to his ostomy. He will start by wearing both the pod and CGM in on his arm. He was also encouraged to try the side of the lower abdomen. Insulin Pump Brand: Omnipod 5 with Dexcom G6    Basic programming of all settings reviewed and entered into pump. See training checklist in patient chart. Basal rate (s): 1 unit per hr based on TDD of 62 units x .75  Max Basal: 2  I:C: 7  Target Glucose: 120  Max Bolus: 30  CF: 30  Active Insulin Time: 3  Infusion Sets:    Infusion Set/Pod location placed  on 10/24/2023     Patient skills:    Taught concept of insulin pump therapy   Reviewed utilization of insulin to carbohydrate ratio and correction factor   Reviewed difference between basal and bolus insulin  Verbalized understanding of prevention/treatment for Hyperglycemia, Hypoglycemia, and DKA with insulin pump therapy  Discussed when to call the 1-800 number for pump problems, diabetes educator, or  physician. Practiced filling reservoir / inserting infusion set/POD      Patient verbalized understanding and has no further questions at this time. Patient Goals/Recommendations: Patient chosen goals included in patient's instructions for today. Patient Chosen Goals:   1. Check in with educator over the next 3 days   2. Begin basal rate testing, if previously discussed with your educator/applicable to you   3. Keep glucose tabs (4 glucose tabs for sugar less than 70) with you, in case of a low blood sugar   4. Twice in doubt, take it out (follow high blood sugar protocol provided at visit)   5. Test blood sugar 3-4x per day.  Enter carbs, blood sugars into insulin pump     Plan:   Advised Rogers Vera to use vendor training resources for reinforcement. Follow up appointment set for Visit date not found. Patient verbalized understanding and has no further questions at this time. Follow up in 1 week.       Janie Gracia RD

## 2023-10-24 NOTE — PATIENT INSTRUCTIONS
Goals       1.   EDC - Insulin Pump (pt-stated)       Note created  10/24/2023  3:19 PM by Charity Ya RD      Bolus 10-15 minutes before eating

## 2023-10-27 NOTE — TELEPHONE ENCOUNTER
Called pt to check in after pump training on 10/24 and discussed Sara Maldonado report so far. Reminded pt to give prebolus when possible and to enter carbs for snacks. Pt due to change pod today and says he feels confident on steps.

## 2023-10-31 ENCOUNTER — OFFICE VISIT (OUTPATIENT)
Dept: HEMATOLOGY/ONCOLOGY | Facility: HOSPITAL | Age: 38
End: 2023-10-31
Attending: INTERNAL MEDICINE

## 2023-10-31 ENCOUNTER — HOSPITAL ENCOUNTER (OUTPATIENT)
Dept: ENDOCRINOLOGY | Facility: HOSPITAL | Age: 38
Discharge: HOME | End: 2023-10-31
Attending: NURSE PRACTITIONER

## 2023-10-31 VITALS
SYSTOLIC BLOOD PRESSURE: 128 MMHG | HEART RATE: 97 BPM | BODY MASS INDEX: 25.27 KG/M2 | OXYGEN SATURATION: 100 % | HEIGHT: 67 IN | WEIGHT: 161 LBS | TEMPERATURE: 98 F | RESPIRATION RATE: 16 BRPM | DIASTOLIC BLOOD PRESSURE: 72 MMHG

## 2023-10-31 DIAGNOSIS — D50.0 IRON DEFICIENCY ANEMIA DUE TO CHRONIC BLOOD LOSS: ICD-10-CM

## 2023-10-31 DIAGNOSIS — K90.89 OTHER SPECIFIED INTESTINAL MALABSORPTION: ICD-10-CM

## 2023-10-31 DIAGNOSIS — E11.9 TYPE 2 DIABETES MELLITUS WITHOUT RETINOPATHY (HCC): Primary | ICD-10-CM

## 2023-10-31 DIAGNOSIS — D50.8 IRON DEFICIENCY ANEMIA SECONDARY TO INADEQUATE DIETARY IRON INTAKE: Primary | ICD-10-CM

## 2023-10-31 DIAGNOSIS — E53.8 B12 DEFICIENCY: ICD-10-CM

## 2023-10-31 PROCEDURE — 96372 THER/PROPH/DIAG INJ SC/IM: CPT

## 2023-10-31 PROCEDURE — 96374 THER/PROPH/DIAG INJ IV PUSH: CPT

## 2023-10-31 RX ORDER — CYANOCOBALAMIN 1000 UG/ML
1000 INJECTION, SOLUTION INTRAMUSCULAR; SUBCUTANEOUS ONCE
Start: 2023-11-21 | End: 2023-11-21

## 2023-10-31 RX ORDER — CYANOCOBALAMIN 1000 UG/ML
1000 INJECTION, SOLUTION INTRAMUSCULAR; SUBCUTANEOUS ONCE
Status: COMPLETED | OUTPATIENT
Start: 2023-10-31 | End: 2023-10-31

## 2023-10-31 RX ORDER — CYANOCOBALAMIN 1000 UG/ML
INJECTION, SOLUTION INTRAMUSCULAR; SUBCUTANEOUS
Status: COMPLETED
Start: 2023-10-31 | End: 2023-10-31

## 2023-10-31 RX ADMIN — CYANOCOBALAMIN 1000 MCG: 1000 INJECTION, SOLUTION INTRAMUSCULAR; SUBCUTANEOUS at 13:48:00

## 2023-10-31 NOTE — PROGRESS NOTES
Pt here for Q4 week VITAMIN B12 INJECTION + VENOFER 200mg . Pt denies any issues or concerns. Ordering provider: MAYCO Burton  Order Exp: 8/4/2024     Pt tolerated infusion without difficulty or complaint.  Reviewed next apt date/time: YES      Education Record  Learner:  Patient  Disease / Diagnosis: GAL  Barriers / Limitations:  None  Method:  Discussion  General Topics:  Side effects and symptom management and Plan of care reviewed  Outcome:  Shows understanding

## 2023-10-31 NOTE — PROGRESS NOTES
Matilde Cox  : 1985 was seen for Individual Insulin Pump Follow up:    Date: 10/31/2023     Start time: 1430  End time: 1530      1 week pump follow up. Pt is happy with the pump and says his sugars are  not as high. He says he is giving his bolus 10-15 minutes before his meals. Reviewed pump settings:  Basal rate (s): 1 unit per hr   Max Basal: 2  I:C: 7  >> 6  Target Glucose: 120  >> 110  Max Bolus: 30  CF: 30  Active Insulin Time: 3     Evaluated pump download:  Average Sensor Glucose (mg/dL): 207  Standard Deviation: 60     41% Time in range  0% Time below range  59% Time above range    TDD: 48.9  %TDD Basal: 52  %TDD Bolus: 48    Overriding pump? No      RECOMMENDATIONS:  Pt to watch pp sugars to see if 6:1 is strong enough. Otherwise, will change to 5:1 and/or consider changing PAKO to 2.5 hours. Pump setting changes:  as above  I:C change:   and Target goals: Daytime      Reviewed pump back up plan. Patient Goals/Recommendations: Patient chosen goals included in patient's instructions for today. Plan: Follow up appt set for:  4 weeks      Patient verbalized understanding and has no further questions at this time.     Christine George RD

## 2023-11-03 ENCOUNTER — APPOINTMENT (OUTPATIENT)
Dept: HEMATOLOGY/ONCOLOGY | Facility: HOSPITAL | Age: 38
End: 2023-11-03
Attending: INTERNAL MEDICINE
Payer: MEDICAID

## 2023-11-16 ENCOUNTER — OFFICE VISIT (OUTPATIENT)
Dept: ENDOCRINOLOGY CLINIC | Facility: CLINIC | Age: 38
End: 2023-11-16

## 2023-11-16 VITALS
HEIGHT: 67.01 IN | BODY MASS INDEX: 25.27 KG/M2 | DIASTOLIC BLOOD PRESSURE: 65 MMHG | WEIGHT: 161 LBS | SYSTOLIC BLOOD PRESSURE: 117 MMHG | HEART RATE: 81 BPM

## 2023-11-16 DIAGNOSIS — E11.9 TYPE 2 DIABETES MELLITUS WITHOUT COMPLICATION, WITH LONG-TERM CURRENT USE OF INSULIN (HCC): ICD-10-CM

## 2023-11-16 DIAGNOSIS — Z79.4 TYPE 2 DIABETES MELLITUS WITH HYPERGLYCEMIA, WITH LONG-TERM CURRENT USE OF INSULIN (HCC): Primary | ICD-10-CM

## 2023-11-16 DIAGNOSIS — E11.65 TYPE 2 DIABETES MELLITUS WITH HYPERGLYCEMIA, WITH LONG-TERM CURRENT USE OF INSULIN (HCC): Primary | ICD-10-CM

## 2023-11-16 DIAGNOSIS — Z79.4 TYPE 2 DIABETES MELLITUS WITHOUT COMPLICATION, WITH LONG-TERM CURRENT USE OF INSULIN (HCC): ICD-10-CM

## 2023-11-16 LAB
CARTRIDGE LOT#: ABNORMAL NUMERIC
GLUCOSE BLOOD: 142
HEMOGLOBIN A1C: 7.6 % (ref 4.3–5.6)
TEST STRIP LOT #: NORMAL NUMERIC

## 2023-11-16 PROCEDURE — 99214 OFFICE O/P EST MOD 30 MIN: CPT | Performed by: NURSE PRACTITIONER

## 2023-11-16 PROCEDURE — 95251 CONT GLUC MNTR ANALYSIS I&R: CPT | Performed by: NURSE PRACTITIONER

## 2023-11-16 PROCEDURE — 3008F BODY MASS INDEX DOCD: CPT | Performed by: NURSE PRACTITIONER

## 2023-11-16 PROCEDURE — 3074F SYST BP LT 130 MM HG: CPT | Performed by: NURSE PRACTITIONER

## 2023-11-16 PROCEDURE — 83036 HEMOGLOBIN GLYCOSYLATED A1C: CPT | Performed by: NURSE PRACTITIONER

## 2023-11-16 PROCEDURE — 82947 ASSAY GLUCOSE BLOOD QUANT: CPT | Performed by: NURSE PRACTITIONER

## 2023-11-16 PROCEDURE — 3051F HG A1C>EQUAL 7.0%<8.0%: CPT | Performed by: NURSE PRACTITIONER

## 2023-11-16 PROCEDURE — 3078F DIAST BP <80 MM HG: CPT | Performed by: NURSE PRACTITIONER

## 2023-11-16 RX ORDER — INSULIN LISPRO 100 [IU]/ML
INJECTION, SOLUTION INTRAVENOUS; SUBCUTANEOUS
Qty: 70 ML | Refills: 0 | Status: SHIPPED | OUTPATIENT
Start: 2023-11-16

## 2023-11-16 RX ORDER — BLOOD SUGAR DIAGNOSTIC
STRIP MISCELLANEOUS
Qty: 300 EACH | Refills: 1 | Status: SHIPPED | OUTPATIENT
Start: 2023-11-16

## 2023-11-16 RX ORDER — LANCETS 33 GAUGE
EACH MISCELLANEOUS
Qty: 300 EACH | Refills: 1 | Status: SHIPPED | OUTPATIENT
Start: 2023-11-16

## 2023-11-16 RX ORDER — INSULIN PMP CART,AUT,G6/7,CNTR
1 EACH SUBCUTANEOUS
Qty: 45 EACH | Refills: 0 | Status: SHIPPED | OUTPATIENT
Start: 2023-11-16

## 2023-11-16 RX ORDER — DULAGLUTIDE 0.75 MG/.5ML
0.75 INJECTION, SOLUTION SUBCUTANEOUS WEEKLY
Qty: 2 ML | Refills: 0 | Status: SHIPPED | OUTPATIENT
Start: 2023-11-16

## 2023-11-16 RX ORDER — DULAGLUTIDE 1.5 MG/.5ML
1.5 INJECTION, SOLUTION SUBCUTANEOUS WEEKLY
Qty: 4 ML | Refills: 0 | Status: SHIPPED | OUTPATIENT
Start: 2023-11-30

## 2023-11-16 NOTE — PATIENT INSTRUCTIONS
A1C: 7.6% today -->decreased from 10.4% on 8/14/2023  Blood glucose: 142 in clinic today    Medications:   - start Trulicity 7.29DU once weekly for 4 weeks    - week 5: increase Trulicity to 2.4XR weekly   -continue with Metformin 1,000mg twice daily     Omnipod 5   Basal rate:  12A 1.0    ICR   12A 6    Target Glucose: 110  Correction Factor: 30 --> 35  Active Insulin Time: 3 hrs    Weight:  Wt Readings from Last 6 Encounters:   11/16/23 161 lb (73 kg)   10/31/23 161 lb (73 kg)   09/08/23 142 lb (64.4 kg)   08/14/23 149 lb (67.6 kg)   03/02/23 157 lb (71.2 kg)   02/22/23 150 lb 12.8 oz (68.4 kg)     A1C goal:  <7.0%    Blood sugar testing:  Continue using Dexcom G6 continuous glucometer     Blood sugar targets:  Before breakfast:   (preferably < 110)  Before meals OR 2 hours after meals: <180 (preferably <150)     Call for persistent blood sugars < 75 or > 200

## 2023-11-28 ENCOUNTER — HOSPITAL ENCOUNTER (OUTPATIENT)
Dept: ENDOCRINOLOGY | Facility: HOSPITAL | Age: 38
Discharge: HOME | End: 2023-11-28
Attending: NURSE PRACTITIONER
Payer: MEDICAID

## 2023-11-28 DIAGNOSIS — E11.9 TYPE 2 DIABETES MELLITUS WITHOUT RETINOPATHY (HCC): Primary | ICD-10-CM

## 2023-11-28 NOTE — PROGRESS NOTES
Mary Lou Guerrero  : 1985 was seen for Individual Insulin Pump Follow up: #2    Date: 2023   Start time: 1250  End time: 1335      1 month pump follow up. Pt is very happy with the pump and says his sugars are not as high. He says he is giving his bolus 10-15 minutes before his meals unless he forgets when he is feeding his kids in the morning. Pt met with FNP last week. ISF change to 35 is being considered due to high frequency of pump pausing auto basal.  Pt is also going to be started on Trulicity. Today we looked day to day at meal and correction boluses. Although after meal BG do not stay high for long, most meals do raise pt's BG  mg/dl. Pt feels that his carb counting is as good as possible given that all foods are home cooked. We decided to change the carb ratio to 5:1 at breakfast and dinner but leave lunch at 6:1. The pt given corrections do not seem to be making his sugars go too low but we will talk in a week to see if the ISF should be changed to 35. Reviewed pump settings:  Basal rate (s): 1 unit per hr   Max Basal: 2  I:C:  6  >>  5 for breakfast and dinner  (6 at lunch)  Target Glucose: 110  Max Bolus: 30  CF: 30  Active Insulin Time: 3           Evaluated pump download:  Average Sensor Glucose (mg/dL): 165  Standard Deviation: 51     67% Time in range  (was 41% at last visit)  0% Time below range  33% Time above range (was 59% at last visit)     TDD: 68.8   (pt has to change pump q 2.5 days)  %TDD Basal: 50  %TDD Bolus: 50     Overriding pump? No       RECOMMENDATIONS:  Use site rotation as discussed for legs and lower back per pt's desire to avoid pod on left side of abdomen and inability to use right side due to ostomy. Pump setting changes:  I:C change: as above    Reviewed pump back up plan. Patient Goals/Recommendations: Patient chosen goals included in patient's instructions for today.   Mary Lou Guerrero has chosen the following ongoing Diabetes Support Plan: The use of Diabetes Websites or Apps      Plan: Follow up appt set for: Visit date not found  Follow up recommended in: No follow-ups on file. Patient verbalized understanding and has no further questions at this time. Will review [place] and talk by phone in 1 week.       Raphael Gunderson RD

## 2023-12-01 ENCOUNTER — APPOINTMENT (OUTPATIENT)
Dept: HEMATOLOGY/ONCOLOGY | Facility: HOSPITAL | Age: 38
End: 2023-12-01
Attending: INTERNAL MEDICINE
Payer: MEDICAID

## 2023-12-08 ENCOUNTER — APPOINTMENT (OUTPATIENT)
Dept: HEMATOLOGY/ONCOLOGY | Facility: HOSPITAL | Age: 38
End: 2023-12-08
Attending: INTERNAL MEDICINE
Payer: MEDICAID

## 2023-12-21 RX ORDER — DULAGLUTIDE 0.75 MG/.5ML
0.75 INJECTION, SOLUTION SUBCUTANEOUS
Qty: 2 ML | Refills: 0 | OUTPATIENT
Start: 2023-12-21

## 2023-12-29 ENCOUNTER — TELEPHONE (OUTPATIENT)
Dept: HEMATOLOGY/ONCOLOGY | Facility: HOSPITAL | Age: 38
End: 2023-12-29

## 2023-12-29 NOTE — TELEPHONE ENCOUNTER
Called patient regarding missed infusion appointment and stated I will let the infusion schedulers know to give him a call to reschedule

## 2024-01-08 ENCOUNTER — OFFICE VISIT (OUTPATIENT)
Dept: DERMATOLOGY CLINIC | Facility: CLINIC | Age: 39
End: 2024-01-08

## 2024-01-08 DIAGNOSIS — L21.9 SEBORRHEIC DERMATITIS: Primary | ICD-10-CM

## 2024-01-08 PROCEDURE — 99213 OFFICE O/P EST LOW 20 MIN: CPT | Performed by: STUDENT IN AN ORGANIZED HEALTH CARE EDUCATION/TRAINING PROGRAM

## 2024-01-08 RX ORDER — KETOCONAZOLE 20 MG/ML
SHAMPOO TOPICAL
Qty: 120 ML | Refills: 11 | Status: SHIPPED | OUTPATIENT
Start: 2024-01-08

## 2024-01-08 RX ORDER — FLUOCINONIDE 0.5 MG/G
OINTMENT TOPICAL
Qty: 60 G | Refills: 2 | Status: SHIPPED | OUTPATIENT
Start: 2024-01-08

## 2024-01-08 NOTE — PROGRESS NOTES
January 8, 2024    Established patient     CHIEF COMPLAINT: Dry scalp    HISTORY OF PRESENT ILLNESS: .    1. Dry scalp  Location: Face and scalp   Duration: 3 months  Signs and symptoms: Dry and flaky  Current treatment: Ketoconazole shampoo  Past treatments: OTC    DERM HISTORY:  History of skin cancer: No  History of chronic skin disease/condition: No    FAMILY HISTORY:  History of melanoma: No  History of chronic skin disease/condition: No    History/Other:    REVIEW OF SYSTEMS:  Constitutional: Denies fever, chills, unintentional weight loss.   Skin as per HPI    PAST MEDICAL HISTORY:  Past Medical History:   Diagnosis Date    Asthma, cough variant     Calculus of kidney     Crohn disease (HCC)     Crohn's disease (HCC)     Diabetes (HCC)     Iron deficiency anemia        Medications  Current Outpatient Medications   Medication Sig Dispense Refill    Dulaglutide (TRULICITY) 1.5 MG/0.5ML Subcutaneous Solution Pen-injector Inject 1.5 mg into the skin once a week. 4 mL 0    insulin lispro (HUMALOG) 100 UNIT/ML Injection Solution Inject via insulin pump. Max daily dose 80 units. 70 mL 0    OneTouch Delica Lancets 33G Does not apply Misc Use to check blood sugar 3x daily 300 each 1    ONETOUCH ULTRA In Vitro Strip Check blood sugar up to 3x daily 300 each 1    Insulin Disposable Pump (OMNIPOD 5 G6 POD, GEN 5,) Does not apply Misc 1 each every other day. 45 each 0    ketoconazole 2 % External Shampoo Use 2-3 times weekly. Lather into scalp, beard, and face and leave on for 5 minutes before washing off. 120 mL 11    hydrocortisone 2.5 % External Cream Apply every morning and evening with flares of face rash 28 g 3    Continuous Blood Gluc Transmit (DEXCOM G6 TRANSMITTER) Does not apply Misc TEST BLOOD GLUCOSE AS DIRECTED WITH DEXCOM G6 SENSOR 1 each 0    Continuous Blood Gluc Sensor (DEXCOM G6 SENSOR) Does not apply Misc Change sensor every 10 days 9 each 0    DEXCOM G6  Does not apply Device 1 Device daily. 1  each 0    Insulin Disposable Pump (OMNIPOD 5 G6 INTRO, GEN 5,) Does not apply Kit 1 each As Directed. 1 kit 0    predniSONE 20 MG Oral Tab Take 2 tablets (40 mg total) by mouth daily. 28 tablet 0    Insulin Pen Needle (TRUEPLUS 5-BEVEL PEN NEEDLES) 32G X 4 MM Does not apply Misc USE TO INJECT INSULIN UP TO THREE TIMES DAILY 100 each 0    montelukast 10 MG Oral Tab Take 1 tablet (10 mg total) by mouth every morning.      Vitamin D3, Cholecalciferol, (VITAMIN D3) 125 MCG (5000 UT) Oral Cap Take 1 capsule (5,000 Units total) by mouth daily.      STELARA 90 MG/ML Subcutaneous Solution Prefilled Syringe injection Inject into the skin every 3 (three) months. Takes every 2 months subcut      metFORMIN HCl 1000 MG Oral Tab Take 1 tablet (1,000 mg total) by mouth 2 (two) times daily.         Objective:    PHYSICAL EXAM:  General: awake, alert, no acute distress  Skin: Skin exam was performed today including the following: Face and scalp. Pertinent findings include:   - Scalp improved  - L nasal ala with persistent scaling    ASSESSMENT & PLAN:  Pathophysiology of diagnoses discussed with patient.  Therapeutic options reviewed. Risks, benefits, and alternatives discussed with patient. Instructions reviewed at length.    #Seborrheic dermatitis  - Ketoconazole 2% shampoo. Use 3 times weekly. Lather into scalp and leave on for 5 minutes before washing off. You may use your regular shampoo or head and shoulders to wash your hair afterwards if desired.   -  Start hydrocortisone 2.5% to affected areas on face up to twice daily Monday-Friday Take weekends off.   - Fluocinonide 0.05% twice daily to affected areas Monday-Friday.Take weekends off. Avoid use on face, breasts, groin, or axiillae.     #AGA  - Continue minoxidil 5%     Return to clinic: 3 months or sooner if something concerning arises      Corbin Schwartz MD

## 2024-01-09 ENCOUNTER — OFFICE VISIT (OUTPATIENT)
Dept: HEMATOLOGY/ONCOLOGY | Facility: HOSPITAL | Age: 39
End: 2024-01-09
Attending: INTERNAL MEDICINE
Payer: MEDICAID

## 2024-01-09 ENCOUNTER — TELEPHONE (OUTPATIENT)
Dept: ENDOCRINOLOGY CLINIC | Facility: CLINIC | Age: 39
End: 2024-01-09

## 2024-01-09 VITALS
OXYGEN SATURATION: 100 % | BODY MASS INDEX: 24 KG/M2 | HEART RATE: 100 BPM | RESPIRATION RATE: 16 BRPM | SYSTOLIC BLOOD PRESSURE: 100 MMHG | DIASTOLIC BLOOD PRESSURE: 67 MMHG | TEMPERATURE: 98 F | WEIGHT: 155.5 LBS

## 2024-01-09 DIAGNOSIS — E53.8 B12 DEFICIENCY: ICD-10-CM

## 2024-01-09 DIAGNOSIS — D50.8 IRON DEFICIENCY ANEMIA SECONDARY TO INADEQUATE DIETARY IRON INTAKE: Primary | ICD-10-CM

## 2024-01-09 DIAGNOSIS — K90.89 OTHER SPECIFIED INTESTINAL MALABSORPTION: ICD-10-CM

## 2024-01-09 DIAGNOSIS — D50.0 IRON DEFICIENCY ANEMIA DUE TO CHRONIC BLOOD LOSS: ICD-10-CM

## 2024-01-09 PROCEDURE — 96374 THER/PROPH/DIAG INJ IV PUSH: CPT

## 2024-01-09 PROCEDURE — 96372 THER/PROPH/DIAG INJ SC/IM: CPT

## 2024-01-09 RX ORDER — CYANOCOBALAMIN 1000 UG/ML
1000 INJECTION, SOLUTION INTRAMUSCULAR; SUBCUTANEOUS ONCE
Start: 2024-01-16 | End: 2024-01-16

## 2024-01-09 RX ORDER — CYANOCOBALAMIN 1000 UG/ML
INJECTION, SOLUTION INTRAMUSCULAR; SUBCUTANEOUS
Status: COMPLETED
Start: 2024-01-09 | End: 2024-01-09

## 2024-01-09 RX ORDER — CYANOCOBALAMIN 1000 UG/ML
1000 INJECTION, SOLUTION INTRAMUSCULAR; SUBCUTANEOUS ONCE
Status: COMPLETED | OUTPATIENT
Start: 2024-01-09 | End: 2024-01-09

## 2024-01-09 RX ADMIN — CYANOCOBALAMIN 1000 MCG: 1000 INJECTION, SOLUTION INTRAMUSCULAR; SUBCUTANEOUS at 15:49:00

## 2024-01-09 NOTE — PROGRESS NOTES
Pt here for monthly Venofer 200mg and Vitamin B12 injection. Pt denies any issues or concerns.      Ordering MD: Harshal/Breann Stout  Order Exp: 10/2024     Pt tolerated infusion without difficulty or complaint. Reviewed next apt date/time: yes, pt asking for 2/9 appointment to be moved to 2/28 since he will be out of town. Schedulers notified, pt is active on [employer]      Education Record  Learner:  Patient  Disease / Diagnosis: GAL + Vit B12 deficiency   Barriers / Limitations:  None  Method:  Reinforcement  General Topics:  Plan of care reviewed  Outcome:  Shows understanding

## 2024-01-12 ENCOUNTER — APPOINTMENT (OUTPATIENT)
Dept: HEMATOLOGY/ONCOLOGY | Facility: HOSPITAL | Age: 39
End: 2024-01-12
Attending: INTERNAL MEDICINE
Payer: MEDICAID

## 2024-01-26 ENCOUNTER — APPOINTMENT (OUTPATIENT)
Dept: HEMATOLOGY/ONCOLOGY | Facility: HOSPITAL | Age: 39
End: 2024-01-26
Attending: INTERNAL MEDICINE
Payer: MEDICAID

## 2024-02-09 ENCOUNTER — APPOINTMENT (OUTPATIENT)
Dept: HEMATOLOGY/ONCOLOGY | Facility: HOSPITAL | Age: 39
End: 2024-02-09
Attending: INTERNAL MEDICINE
Payer: MEDICAID

## 2024-02-20 RX ORDER — INSULIN PMP CART,AUT,G6/7,CNTR
1 EACH SUBCUTANEOUS EVERY OTHER DAY
Qty: 45 EACH | Refills: 0 | Status: SHIPPED | OUTPATIENT
Start: 2024-02-20

## 2024-02-23 ENCOUNTER — APPOINTMENT (OUTPATIENT)
Dept: HEMATOLOGY/ONCOLOGY | Facility: HOSPITAL | Age: 39
End: 2024-02-23
Attending: INTERNAL MEDICINE
Payer: MEDICAID

## 2024-02-28 ENCOUNTER — OFFICE VISIT (OUTPATIENT)
Dept: HEMATOLOGY/ONCOLOGY | Facility: HOSPITAL | Age: 39
End: 2024-02-28
Attending: INTERNAL MEDICINE
Payer: MEDICAID

## 2024-02-28 VITALS
HEIGHT: 67.01 IN | RESPIRATION RATE: 18 BRPM | HEART RATE: 100 BPM | WEIGHT: 159.63 LBS | TEMPERATURE: 98 F | SYSTOLIC BLOOD PRESSURE: 116 MMHG | BODY MASS INDEX: 25.06 KG/M2 | DIASTOLIC BLOOD PRESSURE: 66 MMHG | OXYGEN SATURATION: 100 %

## 2024-02-28 DIAGNOSIS — D50.0 IRON DEFICIENCY ANEMIA DUE TO CHRONIC BLOOD LOSS: ICD-10-CM

## 2024-02-28 DIAGNOSIS — E53.8 B12 DEFICIENCY: ICD-10-CM

## 2024-02-28 DIAGNOSIS — D50.8 IRON DEFICIENCY ANEMIA SECONDARY TO INADEQUATE DIETARY IRON INTAKE: Primary | ICD-10-CM

## 2024-02-28 DIAGNOSIS — K90.89 OTHER SPECIFIED INTESTINAL MALABSORPTION (HCC): ICD-10-CM

## 2024-02-28 PROCEDURE — 96372 THER/PROPH/DIAG INJ SC/IM: CPT

## 2024-02-28 PROCEDURE — 96374 THER/PROPH/DIAG INJ IV PUSH: CPT

## 2024-02-28 RX ORDER — CYANOCOBALAMIN 1000 UG/ML
1000 INJECTION, SOLUTION INTRAMUSCULAR; SUBCUTANEOUS ONCE
Start: 2024-03-06 | End: 2024-03-06

## 2024-02-28 RX ORDER — CYANOCOBALAMIN 1000 UG/ML
1000 INJECTION, SOLUTION INTRAMUSCULAR; SUBCUTANEOUS ONCE
Status: COMPLETED | OUTPATIENT
Start: 2024-02-28 | End: 2024-02-28

## 2024-02-28 RX ORDER — CYANOCOBALAMIN 1000 UG/ML
INJECTION, SOLUTION INTRAMUSCULAR; SUBCUTANEOUS
Status: COMPLETED
Start: 2024-02-28 | End: 2024-02-28

## 2024-02-28 RX ADMIN — CYANOCOBALAMIN 1000 MCG: 1000 INJECTION, SOLUTION INTRAMUSCULAR; SUBCUTANEOUS at 16:16:00

## 2024-02-28 NOTE — PROGRESS NOTES
Pt here for Venofer IV and B12 shot. Patient arrived to unit, PIV started to L AC, good blood return noted. Venofer infused over 2 min. B12 shot given IM to L deltoid. Pt denies any issues or concerns. PIV removed.     Ordering MD: Harshal     Pt tolerated infusion without difficulty or complaint. Reviewed next apt date/time: 3/27      Education Record  Learner:  Patient  Disease / Diagnosis: Iron def anemia  Barriers / Limitations:  None  Method:  Discussion  General Topics:  Medication and Plan of care reviewed  Outcome:  Shows understanding

## 2024-03-04 NOTE — TELEPHONE ENCOUNTER
Continuous Blood Gluc Sensor (DEXCOM G6 SENSOR) Does not apply Misc, Change sensor every 10 days, Disp: 9 each, Rfl: 0

## 2024-03-05 RX ORDER — PROCHLORPERAZINE 25 MG/1
SUPPOSITORY RECTAL
Qty: 9 EACH | Refills: 0 | Status: SHIPPED | OUTPATIENT
Start: 2024-03-05 | End: 2024-03-07

## 2024-03-07 ENCOUNTER — OFFICE VISIT (OUTPATIENT)
Dept: ENDOCRINOLOGY CLINIC | Facility: CLINIC | Age: 39
End: 2024-03-07

## 2024-03-07 VITALS
HEART RATE: 106 BPM | DIASTOLIC BLOOD PRESSURE: 69 MMHG | SYSTOLIC BLOOD PRESSURE: 111 MMHG | BODY MASS INDEX: 25 KG/M2 | WEIGHT: 157 LBS

## 2024-03-07 DIAGNOSIS — E11.65 TYPE 2 DIABETES MELLITUS WITH HYPERGLYCEMIA, WITH LONG-TERM CURRENT USE OF INSULIN (HCC): Primary | ICD-10-CM

## 2024-03-07 DIAGNOSIS — Z79.4 TYPE 2 DIABETES MELLITUS WITH HYPERGLYCEMIA, WITH LONG-TERM CURRENT USE OF INSULIN (HCC): Primary | ICD-10-CM

## 2024-03-07 LAB
CARTRIDGE LOT#: ABNORMAL NUMERIC
GLUCOSE BLOOD: 183
HEMOGLOBIN A1C: 7 % (ref 4.3–5.6)
TEST STRIP LOT #: NORMAL NUMERIC

## 2024-03-07 PROCEDURE — 99214 OFFICE O/P EST MOD 30 MIN: CPT | Performed by: NURSE PRACTITIONER

## 2024-03-07 PROCEDURE — 95251 CONT GLUC MNTR ANALYSIS I&R: CPT | Performed by: NURSE PRACTITIONER

## 2024-03-07 PROCEDURE — 82947 ASSAY GLUCOSE BLOOD QUANT: CPT | Performed by: NURSE PRACTITIONER

## 2024-03-07 PROCEDURE — 83036 HEMOGLOBIN GLYCOSYLATED A1C: CPT | Performed by: NURSE PRACTITIONER

## 2024-03-07 RX ORDER — PROCHLORPERAZINE 25 MG/1
SUPPOSITORY RECTAL
Qty: 9 EACH | Refills: 0 | Status: SHIPPED | OUTPATIENT
Start: 2024-03-07

## 2024-03-07 RX ORDER — INSULIN PMP CART,AUT,G6/7,CNTR
1 EACH SUBCUTANEOUS EVERY OTHER DAY
Qty: 45 EACH | Refills: 0 | Status: SHIPPED | OUTPATIENT
Start: 2024-03-07

## 2024-03-07 RX ORDER — LANCETS 33 GAUGE
EACH MISCELLANEOUS
Qty: 300 EACH | Refills: 1 | Status: SHIPPED | OUTPATIENT
Start: 2024-03-07

## 2024-03-07 RX ORDER — BLOOD-GLUCOSE METER
EACH MISCELLANEOUS
Qty: 1 KIT | Refills: 0 | Status: SHIPPED | OUTPATIENT
Start: 2024-03-07

## 2024-03-07 RX ORDER — INSULIN LISPRO 100 [IU]/ML
INJECTION, SOLUTION INTRAVENOUS; SUBCUTANEOUS
Qty: 70 ML | Refills: 0 | Status: SHIPPED | OUTPATIENT
Start: 2024-03-07

## 2024-03-07 RX ORDER — BLOOD SUGAR DIAGNOSTIC
STRIP MISCELLANEOUS
Qty: 300 EACH | Refills: 1 | Status: SHIPPED | OUTPATIENT
Start: 2024-03-07

## 2024-03-07 RX ORDER — PROCHLORPERAZINE 25 MG/1
SUPPOSITORY RECTAL
Qty: 1 EACH | Refills: 0 | Status: SHIPPED | OUTPATIENT
Start: 2024-03-07

## 2024-03-07 NOTE — PATIENT INSTRUCTIONS
A1C: 7.0% today --> decreased from 7.6% on 11/16/2023  Blood glucose: 183 in clinic today  Endocrinology fax# [phone]    Medications:   -continue with Metformin 1,000mg twice daily     Omnipod 5   Basal rate:  12A 1.0    ICR   12A 5 --> 4  10A 6  4P 5 --> 4    Target Glucose: 110  Correction Factor: 30--> 33  Active Insulin Time: 3 --> 2.5hrs       - repeat labs - fast for 10hrs prior       Weight:  Wt Readings from Last 6 Encounters:   03/07/24 157 lb (71.2 kg)   02/28/24 159 lb 9.6 oz (72.4 kg)   01/09/24 155 lb 8 oz (70.5 kg)   11/16/23 161 lb (73 kg)   10/31/23 161 lb (73 kg)   09/08/23 142 lb (64.4 kg)     A1C goal:  <7.0%    Blood sugar testing:  Continue with Dexcom G6 continuous glucometer     Blood sugar targets:  Before breakfast:  (preferably < 110)  Before meals OR 2 hours after meals: <180 (preferably <150)     Call for persistent blood sugars < 75 or > 200

## 2024-03-07 NOTE — PROGRESS NOTES
Name: Ariel Hunter  Date: 3/7/2024    CHIEF COMPLAINT   Chief Complaint   Patient presents with    Diabetes     HISTORY OF PRESENT ILLNESS   Ariel Hunter is a 38 year old male who presents for follow up on diabetes management.   HbA1C: 7.0% at POC today. This is a decrease from 7.6% on 11/16/2023.   Blood glucose is: 183 in clinic today.  Patient verbalizes that he has been feeling well. He continues to use Omnipod 5 insulin pump and has been compliant with using as recommended and following a low carb diet.     FAMILY HISTORY OF DIABETES  -father and mother   - paternal family   -maternal grandfather   DIABETES HISTORY  Diagnosed: around age 32   - transitioned to Omnipod 5 insulin pump in 10/2023    Prior HbA, C or glycohemoglobin were 8.2% 3/8/2022; 8.5% 7/14/2022; 8.5% 9/15/2022; 8.7% at POC today; 10.6% 3/2/2023; 10.4% 8/14/2023; 7.6% 11/16/2023; 7.0% at POC today;     Patient has not had hospitalizations for blood sugar issues and denies any history of pancreatitis    PREVIOUS MEDICATION FOR DM:  -Januvia -d/c'ed due to transition to MDI  - Glipizide -d/c'ed due to transition to O5    CURRENT MEDICATIONS FOR DM:  Metformin 1,000mg twice daily   Trulicity 1.5mg once weekly --has not started taking due to having a subsequent bowel obstruction episode while traveling.     Omnipod 5   Basal rate:  12A 1.0    ICR   12A 5--> 4  10A 6  4P 5 --> 4    Target Glucose: 110  Correction Factor: 30--> 33  Active Insulin Time: 3 --> 2.5hrs    Insulin:  68% basal= 45.1 units   32% bolus= 20.9 units   Insulin /day= 66 units   # bolus/day= 2.6    Insulin pump interpretation:  - post prandial hyperglycemia noted most often occurring with breakfast/dinner meals.   - glucose readings stable between meals   - multiple pump suspensions noted after each bolus entry    HISTORY OF DIABETES COMPLICATIONS:  History of Retinopathy: denies - last eye exam within the last 12 months: yes    History of Neuropathy: denies   History  of Nephropathy: no     ASSOCIATED COMPLICATIONS:   HTN: no  Hyperlipidemia: no   Cardiovascular Disease: no   Peripheral Vascular Disease: no     DIETARY COMPLIANCE:  Fair; tries to eat a low carb diet     EXERCISE:   No- however he stays active by walking. No changes     Polyuria, polyphagia, polydipsia: no   Paresthesias: no   Blurred vision: no   Recent steroids, illness or infections: no     REVIEW OF SYSTEMS  Constitutional: Negative for: weight change, fever, fatigue, cold/heat intolerance  Eyes: Negative for:  Visual changes, proptosis, blurring  ENT: Negative for:  dysphagia, neck swelling, dysphonia  Respiratory: Negative for: hemoptysis, shortness of breath, cough, or dyspnea.  Cardiovascular: Negative for:  chest pain, chest discomfort, palpitations  GI: Negative for:  abdominal pain, nausea, vomiting, diarrhea, heartburn, constipation  Neurology: Negative for: headache, dizziness, syncope, numbness/tingling, or weakness.   Genito-Urinary: Negative for: dysuria, frequency or hematuria   Hematology/Lymphatics: Negative for: bruising, easy bleeding, lower extremity edema  Skin: Negative for: rash, blister, infection or ulcers.  Endocrine: Negative for: polyuria, polydipsia. No osteoporosis. No thyroid disease.     MEDICATIONS:     Current Outpatient Medications:     Continuous Blood Gluc Sensor (DEXCOM G6 SENSOR) Does not apply Misc, Change sensor every 10 days, Disp: 9 each, Rfl: 0    Insulin Disposable Pump (OMNIPOD 5 G6 POD, GEN 5,) Does not apply Misc, 1 Device every other day., Disp: 45 each, Rfl: 0    fluocinonide 0.05 % External Ointment, Use twice daily  to affected areas in scalp with flares, Disp: 60 g, Rfl: 2    ketoconazole 2 % External Shampoo, Use 2-3 times weekly. Lather into scalp, beard, and face and leave on for 5 minutes before washing off., Disp: 120 mL, Rfl: 11    hydrocortisone 2.5 % External Cream, Apply every morning and evening with flares of face rash, Disp: 28 g, Rfl: 3    insulin  lispro (HUMALOG) 100 UNIT/ML Injection Solution, Inject via insulin pump. Max daily dose 80 units., Disp: 70 mL, Rfl: 0    OneTouch Delica Lancets 33G Does not apply Misc, Use to check blood sugar 3x daily, Disp: 300 each, Rfl: 1    ONETOUCH ULTRA In Vitro Strip, Check blood sugar up to 3x daily, Disp: 300 each, Rfl: 1    Continuous Blood Gluc Transmit (DEXCOM G6 TRANSMITTER) Does not apply Misc, TEST BLOOD GLUCOSE AS DIRECTED WITH DEXCOM G6 SENSOR, Disp: 1 each, Rfl: 0    DEXCOM G6  Does not apply Device, 1 Device daily., Disp: 1 each, Rfl: 0    Insulin Disposable Pump (OMNIPOD 5 G6 INTRO, GEN 5,) Does not apply Kit, 1 each As Directed., Disp: 1 kit, Rfl: 0    predniSONE 20 MG Oral Tab, Take 2 tablets (40 mg total) by mouth daily., Disp: 28 tablet, Rfl: 0    Insulin Pen Needle (TRUEPLUS 5-BEVEL PEN NEEDLES) 32G X 4 MM Does not apply Misc, USE TO INJECT INSULIN UP TO THREE TIMES DAILY, Disp: 100 each, Rfl: 0    montelukast 10 MG Oral Tab, Take 1 tablet (10 mg total) by mouth every morning., Disp: , Rfl:     Vitamin D3, Cholecalciferol, (VITAMIN D3) 125 MCG (5000 UT) Oral Cap, Take 1 capsule (5,000 Units total) by mouth daily., Disp: , Rfl:     STELARA 90 MG/ML Subcutaneous Solution Prefilled Syringe injection, Inject into the skin every 3 (three) months. Takes every 2 months subcut, Disp: , Rfl:     metFORMIN HCl 1000 MG Oral Tab, Take 1 tablet (1,000 mg total) by mouth 2 (two) times daily., Disp: , Rfl:     Dulaglutide (TRULICITY) 1.5 MG/0.5ML Subcutaneous Solution Pen-injector, Inject 1.5 mg into the skin once a week. (Patient not taking: Reported on 3/7/2024), Disp: 4 mL, Rfl: 0    ALLERGIES:   Allergies   Allergen Reactions    Infed [Iron Dextran] HIVES    Ciprofloxacin      Other reaction(s): burning urination       SOCIAL HISTORY:   Social History     Socioeconomic History    Marital status:    Tobacco Use    Smoking status: Never     Passive exposure: Never    Smokeless tobacco: Never    Substance and Sexual Activity    Alcohol use: Never    Drug use: Never   Other Topics Concern    Reaction to local anesthetic No    Pt has a pacemaker No    Pt has a defibrillator No       PAST MEDICAL HISTORY:   Past Medical History:   Diagnosis Date    Asthma, cough variant (HCC)     Calculus of kidney     Crohn disease (HCC)     Crohn's disease (HCC)     Diabetes (HCC)     Iron deficiency anemia        PAST SURGICAL HISTORY:   Past Surgical History:   Procedure Laterality Date    COLONOSCOPY N/A 6/12/2017    Procedure: COLONOSCOPY;  Surgeon: Naomi Moffett MD;  Location: [facility] ENDOSCOPY    COLOSTOMY         PHYSICAL EXAM:   Vitals:    03/07/24 1346   BP: 111/69   Pulse: 106   Weight: 157 lb (71.2 kg)     BMI:   Body mass index is 24.58 kg/m².    General Appearance:  alert, well developed, in no acute distress  Nutritional:  no extreme weight gain or loss  Head: Atraumatic  Eyes:  normal conjunctivae, sclera., normal sclera and normal pupils  Throat/Neck: normal sound to voice. Normal hearing, normal speech  Back: no kyphosis  Respiratory:  Speaking in full sentences, non-labored. no increased work of breathing, no audible wheezing    Skin:  normal moisture and skin texture, no visible lesions  Hair and nails: normal scalp hair  Hematologic:  no excessive bruising  Neuro: motor grossly intact, moving all extremities without difficulty  Psychiatric:  oriented to time, self, and place   Extremities: no obvious extremity swelling, no lesions    Diabetes Foot Exam:  Bilateral barefoot skin diabetic exam is normal, visualized feet and the appearance is normal.  Bilateral monofilament/sensation of both feet is normal.  Pulsation pedal pulse exam of both lower legs/feet is normal as well.    LABS: Pertinent labs reviewed    ASSESSMENT/PLAN:    -Reviewed with patient the pathogenesis of diabetes, clinical significance of A1c, and common complications such as: microvascular, macrovascular and diabetic ketoacidosis.  Patient verbalizes understanding of the importance of glycemic control and the goals of therapy.   -Discussed with patient glucose targets ranges (Fasting  and post prandial <180).     1.Type 2 Diabetes Mellitus, uncontrolled   -LAB DATA  HbA,C: 7.0%  a) Medications  -continue with Metformin 1,000mg twice daily  - insulin pump settings adjusted per above     -discussed getting back on track with entering meal bolus entries 15mins prior to start of meal.   - reviewed option of correcting BG readings with correction bolus after 3hrs of intial bolus.   -unable to use sglt-2 due to history of renal stones   -discussed to continue with low carb diet.   - discussed to continue to stay active as much as safely able   -reviewed target goal BG readings and A1C  -reviewed when to notify me of abnormal BG readings.     b) No nephropathy: GFR: 116 on 2022; repeat labs   c) Utd with optho   d) Foot exam: normal today 3/7/2024  e) cont. using Dexcom G6 continuous glucometer   f) Life style changes reviewed.     2. Blood Pressure Management   -normotensive today      3.Lipids Management   -LDL: 54 and tri on 2020   -reminded to repeat lipid panel       RTC in 3 months   Patient instructed to call sooner if they develop Blood glucose readings <75 and/or if they have readings persistently >200.     The risks and benefits of my recommendations were discussed with the patient today. questions were also answered to the best of my knowledge. Patient verbalizes understanding of these issues and agrees to the plan.    3/7/2024  MAYCO Perry

## 2024-03-08 ENCOUNTER — APPOINTMENT (OUTPATIENT)
Dept: HEMATOLOGY/ONCOLOGY | Facility: HOSPITAL | Age: 39
End: 2024-03-08
Attending: INTERNAL MEDICINE
Payer: MEDICAID

## 2024-03-27 ENCOUNTER — OFFICE VISIT (OUTPATIENT)
Dept: HEMATOLOGY/ONCOLOGY | Facility: HOSPITAL | Age: 39
End: 2024-03-27
Attending: INTERNAL MEDICINE
Payer: MEDICAID

## 2024-03-27 VITALS
DIASTOLIC BLOOD PRESSURE: 71 MMHG | OXYGEN SATURATION: 100 % | TEMPERATURE: 98 F | SYSTOLIC BLOOD PRESSURE: 127 MMHG | HEART RATE: 87 BPM | RESPIRATION RATE: 18 BRPM

## 2024-03-27 DIAGNOSIS — E53.8 B12 DEFICIENCY: ICD-10-CM

## 2024-03-27 DIAGNOSIS — K90.89 OTHER SPECIFIED INTESTINAL MALABSORPTION (HCC): ICD-10-CM

## 2024-03-27 DIAGNOSIS — D50.0 IRON DEFICIENCY ANEMIA DUE TO CHRONIC BLOOD LOSS: ICD-10-CM

## 2024-03-27 DIAGNOSIS — D50.8 IRON DEFICIENCY ANEMIA SECONDARY TO INADEQUATE DIETARY IRON INTAKE: Primary | ICD-10-CM

## 2024-03-27 PROCEDURE — 96372 THER/PROPH/DIAG INJ SC/IM: CPT

## 2024-03-27 PROCEDURE — 96374 THER/PROPH/DIAG INJ IV PUSH: CPT

## 2024-03-27 RX ORDER — CYANOCOBALAMIN 1000 UG/ML
INJECTION, SOLUTION INTRAMUSCULAR; SUBCUTANEOUS
Status: COMPLETED
Start: 2024-03-27 | End: 2024-03-27

## 2024-03-27 RX ORDER — CYANOCOBALAMIN 1000 UG/ML
1000 INJECTION, SOLUTION INTRAMUSCULAR; SUBCUTANEOUS ONCE
Start: 2024-04-03 | End: 2024-04-03

## 2024-03-27 RX ORDER — CYANOCOBALAMIN 1000 UG/ML
1000 INJECTION, SOLUTION INTRAMUSCULAR; SUBCUTANEOUS ONCE
Status: COMPLETED | OUTPATIENT
Start: 2024-03-27 | End: 2024-03-27

## 2024-03-27 RX ADMIN — CYANOCOBALAMIN 1000 MCG: 1000 INJECTION, SOLUTION INTRAMUSCULAR; SUBCUTANEOUS at 16:18:00

## 2024-03-27 NOTE — PROGRESS NOTES
Patient here for Q4wk 200mg Venofer and Vitamin B12 injection IM. Patient denies any issues or concerns.      Ordering MD: Harshal  Order Exp: 8/3/24 for Venofer, 4/28/24 for Vitamin B12     Venofer administered IVP over 3 mins with free flowing NS, patient tolerated well without difficulty or complaint. Reviewed next apt date/time: 4/24 at 4pm    Education Record  Learner:  Patient  Disease / Diagnosis: GAL  Barriers / Limitations:  None  Method:  Reinforcement  General Topics:  Medication and Plan of care reviewed  Outcome:  Shows understanding

## 2024-04-24 ENCOUNTER — OFFICE VISIT (OUTPATIENT)
Dept: HEMATOLOGY/ONCOLOGY | Facility: HOSPITAL | Age: 39
End: 2024-04-24
Attending: INTERNAL MEDICINE
Payer: MEDICAID

## 2024-04-24 VITALS
DIASTOLIC BLOOD PRESSURE: 69 MMHG | OXYGEN SATURATION: 99 % | HEART RATE: 85 BPM | TEMPERATURE: 98 F | RESPIRATION RATE: 16 BRPM | BODY MASS INDEX: 25.13 KG/M2 | WEIGHT: 160.13 LBS | HEIGHT: 67.01 IN | SYSTOLIC BLOOD PRESSURE: 125 MMHG

## 2024-04-24 DIAGNOSIS — D50.0 IRON DEFICIENCY ANEMIA DUE TO CHRONIC BLOOD LOSS: ICD-10-CM

## 2024-04-24 DIAGNOSIS — K90.89 OTHER SPECIFIED INTESTINAL MALABSORPTION (HCC): ICD-10-CM

## 2024-04-24 DIAGNOSIS — D50.8 IRON DEFICIENCY ANEMIA SECONDARY TO INADEQUATE DIETARY IRON INTAKE: Primary | ICD-10-CM

## 2024-04-24 DIAGNOSIS — E53.8 B12 DEFICIENCY: ICD-10-CM

## 2024-04-24 PROCEDURE — 96372 THER/PROPH/DIAG INJ SC/IM: CPT

## 2024-04-24 PROCEDURE — 96374 THER/PROPH/DIAG INJ IV PUSH: CPT

## 2024-04-24 RX ORDER — CYANOCOBALAMIN 1000 UG/ML
1000 INJECTION, SOLUTION INTRAMUSCULAR; SUBCUTANEOUS ONCE
Status: CANCELLED
Start: 2024-05-01 | End: 2024-05-01

## 2024-04-24 RX ORDER — CYANOCOBALAMIN 1000 UG/ML
INJECTION, SOLUTION INTRAMUSCULAR; SUBCUTANEOUS
Status: COMPLETED
Start: 2024-04-24 | End: 2024-04-24

## 2024-04-24 RX ORDER — CYANOCOBALAMIN 1000 UG/ML
1000 INJECTION, SOLUTION INTRAMUSCULAR; SUBCUTANEOUS ONCE
Status: COMPLETED | OUTPATIENT
Start: 2024-04-24 | End: 2024-04-24

## 2024-04-24 RX ORDER — CYANOCOBALAMIN 1000 UG/ML
1000 INJECTION, SOLUTION INTRAMUSCULAR; SUBCUTANEOUS ONCE
Start: 2024-05-01 | End: 2024-05-01

## 2024-04-24 RX ADMIN — CYANOCOBALAMIN 1000 MCG: 1000 INJECTION, SOLUTION INTRAMUSCULAR; SUBCUTANEOUS at 15:39:00

## 2024-04-24 NOTE — PROGRESS NOTES
Pt here for Venofer IV and B12 shot. Patient arrived to unit, PIV started to R AC, good blood return noted. Venofer infused over 2 min. B12 shot given IM to R deltoid. Pt denies any issues or concerns. PIV removed.     Ordering MD: Harshal     Pt tolerated infusion without difficulty or complaint. Reviewed next apt date/time: 5/21      Education Record  Learner:  Patient  Disease / Diagnosis: GAL  Barriers / Limitations:  None  Method:  Discussion  General Topics:  Medication and Plan of care reviewed  Outcome:  Shows understanding

## 2024-05-17 NOTE — TELEPHONE ENCOUNTER
Problem: Diabetes Comorbidity  Goal: Blood Glucose Level Within Targeted Range  Outcome: Progressing  Intervention: Monitor and Manage Glycemia  Flowsheets (Taken 5/17/2024 0531)  Glycemic Management:   blood glucose monitored   supplemental insulin given     Problem: Wound  Goal: Optimal Coping  Outcome: Progressing  Goal: Optimal Functional Ability  Intervention: Optimize Functional Ability  Flowsheets (Taken 5/17/2024 0531)  Activity Management:   Arm raise - L1   Rolling - L1  Activity Assistance Provided: assistance, 2 people   Dressings to wounds remain intact. Encouraged to reposition every 2 hours but refused twice during shift.    LOV;08/14/23    RTC;2months    FU;11/21/23

## 2024-05-21 DIAGNOSIS — D50.0 IRON DEFICIENCY ANEMIA DUE TO CHRONIC BLOOD LOSS: ICD-10-CM

## 2024-05-21 DIAGNOSIS — D50.8 IRON DEFICIENCY ANEMIA SECONDARY TO INADEQUATE DIETARY IRON INTAKE: Primary | ICD-10-CM

## 2024-05-22 ENCOUNTER — TELEPHONE (OUTPATIENT)
Dept: HEMATOLOGY/ONCOLOGY | Facility: HOSPITAL | Age: 39
End: 2024-05-22

## 2024-05-22 ENCOUNTER — APPOINTMENT (OUTPATIENT)
Dept: HEMATOLOGY/ONCOLOGY | Facility: HOSPITAL | Age: 39
End: 2024-05-22
Attending: INTERNAL MEDICINE
Payer: MEDICAID

## 2024-05-22 ENCOUNTER — LAB ENCOUNTER (OUTPATIENT)
Dept: LAB | Age: 39
End: 2024-05-22
Attending: INTERNAL MEDICINE

## 2024-05-22 DIAGNOSIS — D50.8 IRON DEFICIENCY ANEMIA SECONDARY TO INADEQUATE DIETARY IRON INTAKE: ICD-10-CM

## 2024-05-22 DIAGNOSIS — D50.0 IRON DEFICIENCY ANEMIA DUE TO CHRONIC BLOOD LOSS: ICD-10-CM

## 2024-05-22 DIAGNOSIS — E55.9 VITAMIN D DEFICIENCY: ICD-10-CM

## 2024-05-22 LAB
DEPRECATED HBV CORE AB SER IA-ACNC: 10.7 NG/ML
IRON SATN MFR SERPL: 4 %
IRON SERPL-MCNC: 18 UG/DL
TIBC SERPL-MCNC: 435 UG/DL (ref 250–425)
TRANSFERRIN SERPL-MCNC: 292 MG/DL (ref 215–365)
VIT D+METAB SERPL-MCNC: 32 NG/ML (ref 30–100)

## 2024-05-22 PROCEDURE — 82728 ASSAY OF FERRITIN: CPT

## 2024-05-22 PROCEDURE — 36415 COLL VENOUS BLD VENIPUNCTURE: CPT

## 2024-05-22 PROCEDURE — 83540 ASSAY OF IRON: CPT

## 2024-05-22 PROCEDURE — 84466 ASSAY OF TRANSFERRIN: CPT

## 2024-05-22 PROCEDURE — 82306 VITAMIN D 25 HYDROXY: CPT

## 2024-05-22 PROCEDURE — 85025 COMPLETE CBC W/AUTO DIFF WBC: CPT

## 2024-05-22 PROCEDURE — 85060 BLOOD SMEAR INTERPRETATION: CPT

## 2024-05-22 NOTE — TELEPHONE ENCOUNTER
Called patient regarding missed infusion appointment. patient states he received a call yesterday from Dr Ferrer office and was told not to get infusion today but to come tomorrow for Dr Caputo visit and to have blood drawn which he states he will be getting done. Informed patient I would reach out to doctors office to inform him.

## 2024-05-23 ENCOUNTER — APPOINTMENT (OUTPATIENT)
Dept: HEMATOLOGY/ONCOLOGY | Facility: HOSPITAL | Age: 39
End: 2024-05-23
Attending: INTERNAL MEDICINE
Payer: MEDICAID

## 2024-05-23 VITALS
BODY MASS INDEX: 26 KG/M2 | HEART RATE: 90 BPM | WEIGHT: 166.5 LBS | RESPIRATION RATE: 16 BRPM | SYSTOLIC BLOOD PRESSURE: 120 MMHG | DIASTOLIC BLOOD PRESSURE: 63 MMHG | OXYGEN SATURATION: 99 % | TEMPERATURE: 98 F

## 2024-05-23 DIAGNOSIS — K90.89 OTHER SPECIFIED INTESTINAL MALABSORPTION (HCC): ICD-10-CM

## 2024-05-23 DIAGNOSIS — D50.8 IRON DEFICIENCY ANEMIA SECONDARY TO INADEQUATE DIETARY IRON INTAKE: Primary | ICD-10-CM

## 2024-05-23 DIAGNOSIS — D50.0 IRON DEFICIENCY ANEMIA DUE TO CHRONIC BLOOD LOSS: ICD-10-CM

## 2024-05-23 DIAGNOSIS — E53.8 B12 DEFICIENCY: ICD-10-CM

## 2024-05-23 LAB
BASOPHILS # BLD AUTO: 0.08 X10(3) UL (ref 0–0.2)
BASOPHILS NFR BLD AUTO: 0.9 %
DEPRECATED RDW RBC AUTO: 58.4 FL (ref 35.1–46.3)
EOSINOPHIL # BLD AUTO: 0.21 X10(3) UL (ref 0–0.7)
EOSINOPHIL NFR BLD AUTO: 2.4 %
ERYTHROCYTE [DISTWIDTH] IN BLOOD BY AUTOMATED COUNT: 26.3 % (ref 11–15)
HCT VFR BLD AUTO: 34.3 %
HGB BLD-MCNC: 9.9 G/DL
IMM GRANULOCYTES # BLD AUTO: 0.1 X10(3) UL (ref 0–1)
IMM GRANULOCYTES NFR BLD: 1.1 %
LYMPHOCYTES # BLD AUTO: 2.05 X10(3) UL (ref 1–4)
LYMPHOCYTES NFR BLD AUTO: 23.3 %
MCH RBC QN AUTO: 18.6 PG (ref 26–34)
MCHC RBC AUTO-ENTMCNC: 28.9 G/DL (ref 31–37)
MCV RBC AUTO: 64.4 FL
MONOCYTES # BLD AUTO: 0.72 X10(3) UL (ref 0.1–1)
MONOCYTES NFR BLD AUTO: 8.2 %
NEUTROPHILS # BLD AUTO: 5.63 X10 (3) UL (ref 1.5–7.7)
NEUTROPHILS # BLD AUTO: 5.63 X10(3) UL (ref 1.5–7.7)
NEUTROPHILS NFR BLD AUTO: 64.1 %
PLATELET # BLD AUTO: 395 10(3)UL (ref 150–450)
PLATELET MORPHOLOGY: NORMAL
PLATELETS.RETICULATED NFR BLD AUTO: 2.8 % (ref 0–7)
RBC # BLD AUTO: 5.33 X10(6)UL
WBC # BLD AUTO: 8.8 X10(3) UL (ref 4–11)

## 2024-05-23 PROCEDURE — 96374 THER/PROPH/DIAG INJ IV PUSH: CPT

## 2024-05-23 PROCEDURE — 96372 THER/PROPH/DIAG INJ SC/IM: CPT

## 2024-05-23 RX ORDER — CYANOCOBALAMIN 1000 UG/ML
1000 INJECTION, SOLUTION INTRAMUSCULAR; SUBCUTANEOUS ONCE
Status: COMPLETED | OUTPATIENT
Start: 2024-05-23 | End: 2024-05-23

## 2024-05-23 RX ORDER — CYANOCOBALAMIN 1000 UG/ML
1000 INJECTION, SOLUTION INTRAMUSCULAR; SUBCUTANEOUS ONCE
Start: 2024-05-30 | End: 2024-05-30

## 2024-05-23 RX ORDER — CYANOCOBALAMIN 1000 UG/ML
INJECTION, SOLUTION INTRAMUSCULAR; SUBCUTANEOUS
Status: COMPLETED
Start: 2024-05-23 | End: 2024-05-23

## 2024-05-23 RX ADMIN — CYANOCOBALAMIN 1000 MCG: 1000 INJECTION, SOLUTION INTRAMUSCULAR; SUBCUTANEOUS at 15:16:00

## 2024-05-23 NOTE — PROGRESS NOTES
Pt here for Venofer 200mg and B12 injection. Pt denies any issues or concerns. Venofer given over 5 minutes through Y-site with free-flowing NS. B12 injection given IM to L deltoid.      Ordering MD: Ermias     Pt tolerated infusion and injection without difficulty or complaint. Reviewed next apt date/time: 6/18      Education Record  Learner:  Patient  Disease / Diagnosis: GAL  Barriers / Limitations:  None  Method:  Reinforcement  General Topics:  Plan of care reviewed  Outcome:  Shows understanding

## 2024-06-18 ENCOUNTER — APPOINTMENT (OUTPATIENT)
Dept: HEMATOLOGY/ONCOLOGY | Facility: HOSPITAL | Age: 39
End: 2024-06-18
Attending: INTERNAL MEDICINE
Payer: MEDICAID

## 2024-06-18 ENCOUNTER — TELEPHONE (OUTPATIENT)
Dept: HEMATOLOGY/ONCOLOGY | Facility: HOSPITAL | Age: 39
End: 2024-06-18

## 2024-06-18 RX ORDER — INSULIN PMP CART,AUT,G6/7,CNTR
EACH SUBCUTANEOUS
Qty: 45 EACH | Refills: 0 | Status: SHIPPED | OUTPATIENT
Start: 2024-06-18

## 2024-06-18 NOTE — TELEPHONE ENCOUNTER
Endocrine Refill protocol for CGM supplies       Protocol Criteria:fail  Appointment with Endocrinology completed in the last 12 months or scheduled in the next 6 months     Verify appointment has been completed or scheduled in the appropriate timeline. If so can send a 90 day supply with 1 refill.        Last completed office visit:3/7/2024 Vic Moore APRN   Next scheduled Follow up: no future appt

## 2024-06-18 NOTE — TELEPHONE ENCOUNTER
Refill request    NOt listed:      Metformin 1000mg Tablets  Qty: 180  SIG:  Take 1 tablet(1000mg) by mouth twice daily.    Current Outpatient Medications   Medication Sig Dispense Refill

## 2024-06-18 NOTE — TELEPHONE ENCOUNTER
Refill Request for medication(s): FLUOCINONIDE 0.05 % External Ointment     Last Office Visit: 01/08/24    Last Refill: 01/08/24    Pharmacy, Dosage verified: [id number] - [place], [place] - [address] [facility] [place] [address], [phone], [phone]    Condition Update (if applicable):     Rx pended and sent to provider for approval, please advise. Thank You!

## 2024-06-19 ENCOUNTER — APPOINTMENT (OUTPATIENT)
Dept: HEMATOLOGY/ONCOLOGY | Facility: HOSPITAL | Age: 39
End: 2024-06-19
Attending: INTERNAL MEDICINE
Payer: MEDICAID

## 2024-06-19 RX ORDER — FLUOCINONIDE 0.5 MG/G
OINTMENT TOPICAL
Qty: 60 G | Refills: 2 | Status: SHIPPED | OUTPATIENT
Start: 2024-06-19

## 2024-06-20 ENCOUNTER — OFFICE VISIT (OUTPATIENT)
Dept: HEMATOLOGY/ONCOLOGY | Facility: HOSPITAL | Age: 39
End: 2024-06-20
Attending: INTERNAL MEDICINE
Payer: MEDICAID

## 2024-06-20 VITALS
RESPIRATION RATE: 16 BRPM | BODY MASS INDEX: 26.55 KG/M2 | SYSTOLIC BLOOD PRESSURE: 110 MMHG | TEMPERATURE: 98 F | HEART RATE: 85 BPM | OXYGEN SATURATION: 100 % | WEIGHT: 169.13 LBS | HEIGHT: 67 IN | DIASTOLIC BLOOD PRESSURE: 70 MMHG

## 2024-06-20 DIAGNOSIS — D50.0 IRON DEFICIENCY ANEMIA DUE TO CHRONIC BLOOD LOSS: ICD-10-CM

## 2024-06-20 DIAGNOSIS — E53.8 B12 DEFICIENCY: ICD-10-CM

## 2024-06-20 DIAGNOSIS — K90.9 IRON MALABSORPTION (HCC): ICD-10-CM

## 2024-06-20 DIAGNOSIS — D64.9 ANEMIA, UNSPECIFIED TYPE: ICD-10-CM

## 2024-06-20 DIAGNOSIS — K90.89 OTHER SPECIFIED INTESTINAL MALABSORPTION (HCC): ICD-10-CM

## 2024-06-20 DIAGNOSIS — D50.8 IRON DEFICIENCY ANEMIA SECONDARY TO INADEQUATE DIETARY IRON INTAKE: Primary | ICD-10-CM

## 2024-06-20 LAB
BASOPHILS # BLD AUTO: 0.07 X10(3) UL (ref 0–0.2)
BASOPHILS NFR BLD AUTO: 1 %
DEPRECATED RDW RBC AUTO: 47.3 FL (ref 35.1–46.3)
EOSINOPHIL # BLD AUTO: 0.23 X10(3) UL (ref 0–0.7)
EOSINOPHIL NFR BLD AUTO: 3.3 %
ERYTHROCYTE [DISTWIDTH] IN BLOOD BY AUTOMATED COUNT: 21.6 % (ref 11–15)
HCT VFR BLD AUTO: 29.9 %
HGB BLD-MCNC: 8.7 G/DL
IMM GRANULOCYTES # BLD AUTO: 0.06 X10(3) UL (ref 0–1)
IMM GRANULOCYTES NFR BLD: 0.9 %
LYMPHOCYTES # BLD AUTO: 1.93 X10(3) UL (ref 1–4)
LYMPHOCYTES NFR BLD AUTO: 27.7 %
MCH RBC QN AUTO: 18.3 PG (ref 26–34)
MCHC RBC AUTO-ENTMCNC: 29.1 G/DL (ref 31–37)
MCV RBC AUTO: 62.9 FL
MONOCYTES # BLD AUTO: 0.65 X10(3) UL (ref 0.1–1)
MONOCYTES NFR BLD AUTO: 9.3 %
NEUTROPHILS # BLD AUTO: 4.02 X10 (3) UL (ref 1.5–7.7)
NEUTROPHILS # BLD AUTO: 4.02 X10(3) UL (ref 1.5–7.7)
NEUTROPHILS NFR BLD AUTO: 57.8 %
PLATELET # BLD AUTO: 355 10(3)UL (ref 150–450)
PLATELETS.RETICULATED NFR BLD AUTO: 3.2 % (ref 0–7)
RBC # BLD AUTO: 4.75 X10(6)UL
WBC # BLD AUTO: 7 X10(3) UL (ref 4–11)

## 2024-06-20 PROCEDURE — 99214 OFFICE O/P EST MOD 30 MIN: CPT | Performed by: NURSE PRACTITIONER

## 2024-06-20 PROCEDURE — 96374 THER/PROPH/DIAG INJ IV PUSH: CPT

## 2024-06-20 PROCEDURE — 96372 THER/PROPH/DIAG INJ SC/IM: CPT

## 2024-06-20 PROCEDURE — 85025 COMPLETE CBC W/AUTO DIFF WBC: CPT

## 2024-06-20 RX ORDER — CYANOCOBALAMIN 1000 UG/ML
1000 INJECTION, SOLUTION INTRAMUSCULAR; SUBCUTANEOUS ONCE
Status: COMPLETED | OUTPATIENT
Start: 2024-06-20 | End: 2024-06-20

## 2024-06-20 RX ORDER — CYANOCOBALAMIN 1000 UG/ML
1000 INJECTION, SOLUTION INTRAMUSCULAR; SUBCUTANEOUS ONCE
Start: 2024-07-18 | End: 2024-07-18

## 2024-06-20 RX ORDER — CYANOCOBALAMIN 1000 UG/ML
INJECTION, SOLUTION INTRAMUSCULAR; SUBCUTANEOUS
Status: COMPLETED
Start: 2024-06-20 | End: 2024-06-20

## 2024-06-20 RX ADMIN — CYANOCOBALAMIN 1000 MCG: 1000 INJECTION, SOLUTION INTRAMUSCULAR; SUBCUTANEOUS at 16:07:00

## 2024-06-20 NOTE — PROGRESS NOTES
FLOR     Ariel Hunter is a 38 year old male who is here today for follow-up of of   Encounter Diagnoses   Name Primary?    Iron deficiency anemia secondary to inadequate dietary iron intake Yes    Anemia, unspecified type     Iron malabsorption (HCC)     Iron deficiency anemia due to chronic blood loss     B12 deficiency           Iron therapy:  by intravenous injection.  The patient had reaction to iron dextran.  Therefore, he completed his course of intravenous iron therapy with 5 doses of 200 mg of Venofer each, this was on 05/24/21.  He is on maintenance venofer 200mg monthly.    He is also receiving intramuscular B12 injections which he is tolerating well.    Overall the patient feels improved.    Symptoms:  Fatigue:   none  CH/SOB:   none  CP and/or palpitations:   none  Headaches:   none  Dizziness or lightheadedness:  worse and since steroids states due to changes in BS.  Pica:   none      States was having leg cramps at night.    Will be having scan at [facility] on 9/18/23.  States following with GI and surgery there every 1.5 months.  Recently started on prednisone for a flare up.  He is still on Stellara for Chron's. He has f/u with GI at [facility].      Recent ER and hospitalization in April x 4 days at [facility]- received transfusions. Pt had bowel obstruction.     Feeling better tioday. No c/o pain. Ostomy with output no bleeding noted.     On prednisone 20 mg. Sugars are higher controlling with diet         Review of Systems:   Review of Systems   Constitutional:  Negative for appetite change, fatigue and unexpected weight change.   Respiratory:  Negative for shortness of breath.    Cardiovascular:  Negative for chest pain and palpitations.   Gastrointestinal:  Positive for abdominal pain (some times, intermiitent). Negative for constipation and diarrhea.        Sometimes GERD.  Feels that has GI slow transit.    Genitourinary:  Negative for frequency.    Musculoskeletal:  Positive for myalgias.   Neurological:   Negative for dizziness, headaches, light-headedness and numbness.   Hematological:  Does not bruise/bleed easily.   Psychiatric/Behavioral:  Negative for sleep disturbance.            Current Outpatient Medications   Medication Sig Dispense Refill    fluocinonide 0.05 % External Ointment APPLY TO AFFECTED AREA IN SCALP TWICE DAILY WITH FLARES 60 g 2    OMNIPOD 5 G6 PODS, GEN 5, Does not apply Misc USE ONE DEVICE EVERY OTHER DAY 45 each 0    Continuous Blood Gluc Sensor (DEXCOM G6 SENSOR) Does not apply Misc Change sensor every 10 days 9 each 0    Continuous Blood Gluc Transmit (DEXCOM G6 TRANSMITTER) Does not apply Misc TEST BLOOD GLUCOSE AS DIRECTED WITH DEXCOM G6 SENSOR 1 each 0    insulin lispro (HUMALOG) 100 UNIT/ML Injection Solution Inject via insulin pump. Max daily dose 80 units. 70 mL 0    ONETOUCH ULTRA In Vitro Strip Check blood sugar up to 3x daily 300 each 1    OneTouch Delica Lancets 33G Does not apply Misc Use to check blood sugar 3x daily 300 each 1    Blood Glucose Monitoring Suppl (ONETOUCH ULTRA 2) w/Device Does not apply Kit Test 3x daily 1 kit 0    ketoconazole 2 % External Shampoo Use 2-3 times weekly. Lather into scalp, beard, and face and leave on for 5 minutes before washing off. 120 mL 11    hydrocortisone 2.5 % External Cream Apply every morning and evening with flares of face rash 28 g 3    DEXCOM G6  Does not apply Device 1 Device daily. 1 each 0    Insulin Disposable Pump (OMNIPOD 5 G6 INTRO, GEN 5,) Does not apply Kit 1 each As Directed. 1 kit 0    predniSONE 20 MG Oral Tab Take 2 tablets (40 mg total) by mouth daily. 28 tablet 0    Insulin Pen Needle (TRUEPLUS 5-BEVEL PEN NEEDLES) 32G X 4 MM Does not apply Misc USE TO INJECT INSULIN UP TO THREE TIMES DAILY 100 each 0    montelukast 10 MG Oral Tab Take 1 tablet (10 mg total) by mouth every morning.      Vitamin D3, Cholecalciferol, (VITAMIN D3) 125 MCG (5000 UT) Oral Cap Take 1 capsule (5,000 Units total) by mouth daily.       STELARA 90 MG/ML Subcutaneous Solution Prefilled Syringe injection Inject into the skin every 3 (three) months. Takes every 2 months subcut      Dulaglutide (TRULICITY) 1.5 MG/0.5ML Subcutaneous Solution Pen-injector Inject 1.5 mg into the skin once a week. (Patient not taking: Reported on 6/20/2024) 4 mL 0     Allergies:   Allergies   Allergen Reactions    Infed [Iron Dextran] HIVES    Ciprofloxacin      Other reaction(s): burning urination       Past Medical History:    Asthma, cough variant (HCC)    Calculus of kidney    Crohn disease (HCC)    Crohn's disease (HCC)    Diabetes (HCC)    Iron deficiency anemia     Past Surgical History:   Procedure Laterality Date    Colonoscopy N/A 6/12/2017    Procedure: COLONOSCOPY;  Surgeon: Naomi Moffett MD;  Location: [facility] ENDOSCOPY    Colostomy       Social History     Socioeconomic History    Marital status:    Tobacco Use    Smoking status: Never     Passive exposure: Never    Smokeless tobacco: Never   Substance and Sexual Activity    Alcohol use: Never    Drug use: Never   Other Topics Concern    Reaction to local anesthetic No    Pt has a pacemaker No    Pt has a defibrillator No     Social Determinants of Health     Food Insecurity: No Food Insecurity (12/8/2023)    Received from [facility]    Hunger Vital Sign     Worried About Running Out of Food in the Last Year: Never true     Ran Out of Food in the Last Year: Never true   Transportation Needs: No Transportation Needs (12/8/2023)    Received from [facility] [address]    PRAPARE - Transportation     Lack of Transportation (Medical): No     Lack of Transportation (Non-Medical): No   Housing Stability: Low Risk  (12/8/2023)    Received from [facility]    Housing Stability Vital Sign     Unable to Pay for Housing in the Last Year: No     Number of Places Lived in the Last Year: 1     Unstable Housing in the Last Year: No         Family History   Problem Relation Age of Onset    Diabetes  Mother     Diabetes Father     Glaucoma Neg     Macular degeneration Neg     Cataracts Neg          PHYSICAL EXAM:    /70 (BP Location: Left arm, Patient Position: Sitting, Cuff Size: adult)   Pulse 85   Temp 97.9 °F (36.6 °C) (Oral)   Resp 16   Ht 1.702 m (5' 7\")   Wt 76.7 kg (169 lb 1.6 oz)   SpO2 100%   BMI 26.48 kg/m²   Wt Readings from Last 6 Encounters:   06/20/24 76.7 kg (169 lb 1.6 oz)   05/23/24 75.5 kg (166 lb 8 oz)   04/24/24 72.6 kg (160 lb 1.6 oz)   03/07/24 71.2 kg (157 lb)   02/28/24 72.4 kg (159 lb 9.6 oz)   01/09/24 70.5 kg (155 lb 8 oz)     Physical Exam  General: Patient is alert, not in acute distress.  HEENT: EOMs intact. PERRL.   Neck: No JVD. No palpable lymphadenopathy. Neck is supple.  Chest: Clear to auscultation.  Heart: Regular rate and rhythm.   Abdomen: Soft, non tender with good bowel sounds.  Ileostomy on the right upper quadrant  Extremities: No edema.  Neurological: Grossly intact.   Lymphatics: There is no palpable lymphadenopathy throughout in the cervical, supraclavicular, axillary, or inguinal regions.  Psych/Depression: nl        ASSESSMENT/PLAN:     Encounter Diagnoses   Name Primary?    Iron deficiency anemia secondary to inadequate dietary iron intake Yes    Anemia, unspecified type     Iron malabsorption (HCC)     Iron deficiency anemia due to chronic blood loss     B12 deficiency        Patient has anemia due to chronic iron deficiency secondary to poor absorption as a sequela of Chron's disease and its management.  He has had parenteral iron replacement in the past and has responded and tolerated well.  In addition, he has B12 deficiency and has been off supplementation.  Also noted to have vitamin D deficiency.    Given the patient's severe microcytosis, suspect he also has thalassemia trait, which will test at a later time after iron is replaced.     --Iron deficiency replaced with Venofer 200 mg x 5 doses, after he had an infusion related reaction to the  INFeD.  Patient has responded very well and his hemoglobin has markedly improved.  However, he is still iron deficient.  Completed a second course of Venofer 200 mg for 5 more doses, and he is improved but still with iron deficiency.  Continue with monthly Venofer infusions for maintenance.  Most recent labs were drawn after iron infusion, hence elevated.  Will repeat next visit prior to infusion.    Recent hospitalization requiring 2 units pRBC for Hgb 6.9  Due today for venofer  Hx reaction to iron dextran Infed    Will given an additional venofer dose at 2 weeks d/t patient recent hospitalization and requiring transfusion.     Discussed with patient recent venofer shortage will try to continue. May require a change to another iron product- Not Infed.         --B12 deficiency, he is now on intramuscular B12 once a month.  Continue lifelong.  B12 levels now normal.  We will need to repeat B12 levels as last patient is on B12 replacement therapy.    --Continue to take folic acid once daily.    --vitamin D deficiency:  Daily vitamin D 5000IU. Completed. Now take 2000 international units daily     Follow up in 6 months      No orders of the defined types were placed in this encounter.    MDM Moderate    Results From Past 48 Hours:  No results found for this or any previous visit (from the past 48 hour(s)).  Imaging & Referrals:  None     Component      Latest Ref Rng 5/22/2024   WBC      4.0 - 11.0 x10(3) uL 8.8    RBC      4.30 - 5.70 x10(6)uL 5.33    Hemoglobin      13.0 - 17.5 g/dL 9.9 (L)    Hematocrit      39.0 - 53.0 % 34.3 (L)    MCV      80.0 - 100.0 fL 64.4 (L)    MCH      26.0 - 34.0 pg 18.6 (L)    MCHC      31.0 - 37.0 g/dL 28.9 (L)    RDW-SD      35.1 - 46.3 fL 58.4 (H)    RDW      11.0 - 15.0 % 26.3 (H)    Platelet Count      150.0 - 450.0 10(3)uL 395.0    Immature Platelet Fraction      0.0 - 7.0 % 2.8    Prelim Neutrophil Abs      1.50 - 7.70 x10 (3) uL 5.63    Neutrophils Absolute      1.50 - 7.70  x10(3) uL 5.63    Lymphocytes Absolute      1.00 - 4.00 x10(3) uL 2.05    Monocytes Absolute      0.10 - 1.00 x10(3) uL 0.72    Eosinophils Absolute      0.00 - 0.70 x10(3) uL 0.21    Basophils Absolute      0.00 - 0.20 x10(3) uL 0.08    Immature Granulocyte Absolute      0.00 - 1.00 x10(3) uL 0.10    Neutrophils %      % 64.1    Lymphocytes %      % 23.3    Monocytes %      % 8.2    Eosinophils %      % 2.4    Basophils %      % 0.9    Immature Granulocyte %      % 1.1    Iron, Serum      65 - 175 ug/dL 18 (L)    Transferrin      215 - 365 mg/dL 292    Iron Bind.Cap.(TIBC)      250 - 425 ug/dL 435 (H)    Iron Saturation      20 - 50 % 4 (L)    FERRITIN      48.0 - 420.0 ng/mL 10.7 (L)    VITAMIN D, 25-OH, TOTAL      30.0 - 100.0 ng/mL 32.0

## 2024-06-20 NOTE — PROGRESS NOTES
Pt here for Venofer 200mg and Vitamin B12 injection . Pt denies any issues or concerns.      Ordering MD:SALVADOR MAO        Pt tolerated infusion without difficulty or complaint. Reviewed next apt date/time: 7/8 at 1600  Dale is aware that he will need an extra one time dose of Venofer on 7/8      Education Record  Learner:  Patient  Disease / Diagnosis: Anemia  Barriers / Limitations:  None  Method:  Discussion  General Topics:  Plan of care reviewed  Outcome:  Shows understanding

## 2024-06-21 NOTE — TELEPHONE ENCOUNTER
Endocrine Refill protocol for metformin    Protocol Criteria:pass    -Appointment with Endocrinology completed in the last 6 months or scheduled in the next 3 months    -GFR greater than or equal to 40 in the past 12 months     -A1c result <8.5% in the past 6 months      Verify the above has been completed or scheduled in the appropriate timeline. If so can send a 90 day supply with 1 refill.       Last completed office visit:3/7/2024 Vic Moore APRN   Next scheduled Follow up: 08/30/24      Last GFR result;116      Last A1c result:7.0

## 2024-07-08 ENCOUNTER — OFFICE VISIT (OUTPATIENT)
Dept: DERMATOLOGY CLINIC | Facility: CLINIC | Age: 39
End: 2024-07-08

## 2024-07-08 ENCOUNTER — OFFICE VISIT (OUTPATIENT)
Dept: HEMATOLOGY/ONCOLOGY | Facility: HOSPITAL | Age: 39
End: 2024-07-08
Attending: INTERNAL MEDICINE
Payer: MEDICAID

## 2024-07-08 VITALS
HEART RATE: 93 BPM | WEIGHT: 168.69 LBS | RESPIRATION RATE: 16 BRPM | OXYGEN SATURATION: 100 % | DIASTOLIC BLOOD PRESSURE: 65 MMHG | SYSTOLIC BLOOD PRESSURE: 128 MMHG | BODY MASS INDEX: 26 KG/M2 | TEMPERATURE: 99 F

## 2024-07-08 DIAGNOSIS — D50.0 IRON DEFICIENCY ANEMIA DUE TO CHRONIC BLOOD LOSS: ICD-10-CM

## 2024-07-08 DIAGNOSIS — K90.89 OTHER SPECIFIED INTESTINAL MALABSORPTION (HCC): ICD-10-CM

## 2024-07-08 DIAGNOSIS — L21.9 SEBORRHEIC DERMATITIS: Primary | ICD-10-CM

## 2024-07-08 DIAGNOSIS — D50.8 IRON DEFICIENCY ANEMIA SECONDARY TO INADEQUATE DIETARY IRON INTAKE: Primary | ICD-10-CM

## 2024-07-08 DIAGNOSIS — L64.9 ANDROGENETIC ALOPECIA: ICD-10-CM

## 2024-07-08 PROCEDURE — 96374 THER/PROPH/DIAG INJ IV PUSH: CPT

## 2024-07-08 PROCEDURE — 99214 OFFICE O/P EST MOD 30 MIN: CPT | Performed by: STUDENT IN AN ORGANIZED HEALTH CARE EDUCATION/TRAINING PROGRAM

## 2024-07-08 RX ORDER — INSULIN LISPRO 100 [IU]/ML
INJECTION, SOLUTION INTRAVENOUS; SUBCUTANEOUS
COMMUNITY
Start: 2024-03-01

## 2024-07-08 RX ORDER — FLUOCINONIDE 0.5 MG/G
OINTMENT TOPICAL
Qty: 60 G | Refills: 2 | Status: SHIPPED | OUTPATIENT
Start: 2024-07-08

## 2024-07-08 RX ORDER — KETOCONAZOLE 20 MG/ML
SHAMPOO TOPICAL
Qty: 120 ML | Refills: 11 | Status: SHIPPED | OUTPATIENT
Start: 2024-07-08

## 2024-07-08 RX ORDER — CYANOCOBALAMIN 1000 UG/ML
1000 INJECTION, SOLUTION INTRAMUSCULAR; SUBCUTANEOUS ONCE
Start: 2024-07-15 | End: 2024-07-15

## 2024-07-08 RX ORDER — FLUOCINONIDE 0.5 MG/G
OINTMENT TOPICAL
Qty: 60 G | Refills: 2 | Status: CANCELLED | OUTPATIENT
Start: 2024-07-08

## 2024-07-08 RX ORDER — MINOXIDIL 2.5 MG/1
2.5 TABLET ORAL DAILY
Qty: 180 TABLET | Refills: 0 | Status: SHIPPED | OUTPATIENT
Start: 2024-07-08

## 2024-07-08 NOTE — PROGRESS NOTES
Pt here for Venofer 200mg . Pt denies any issues or concerns.      Ordering Provider: DEISI Rolle  Order Exp: 8/3/2024     Pt tolerated infusion without difficulty or complaint. No s&s of reaction noted. PIV removed, gauze and coban applied.   Reviewed next apt date/time: 8/2 at 1600        Education Record  Learner:  Patient  Disease / Diagnosis: GAL   Barriers / Limitations:  None  Method:  Reinforcement  General Topics:  Plan of care reviewed  Outcome:  Shows understanding

## 2024-07-08 NOTE — PROGRESS NOTES
July 8, 2024    Established patient     seborrheic dermatitis    HISTORY OF PRESENT ILLNESS: .    1. Seborrheic dermatitis 6 months F/U  Location: scalp, face   Duration: 6 months  Signs and symptoms: improved  Current treatment: ketoconazole, fluocinonide, HC  Past treatments: as above        DERM HISTORY:  History of skin cancer: No  History of chronic skin disease/condition: No    FAMILY HISTORY:  History of melanoma: No  History of chronic skin disease/condition: No    History/Other:    REVIEW OF SYSTEMS:  Constitutional: Denies fever, chills, unintentional weight loss.   Skin as per HPI    PAST MEDICAL HISTORY:  Past Medical History:    Asthma, cough variant (HCC)    Calculus of kidney    Crohn disease (HCC)    Crohn's disease (HCC)    Diabetes (HCC)    Iron deficiency anemia       Medications  Current Outpatient Medications   Medication Sig Dispense Refill    Insulin Lispro, 1 Unit Dial, 100 UNIT/ML Subcutaneous Solution Pen-injector INJECT 80 UNITS VIA INSULIN PUMP EVERY DAY      metFORMIN HCl 1000 MG Oral Tab Take 1 tablet (1,000 mg total) by mouth 2 (two) times daily with meals. 90 tablet 1    fluocinonide 0.05 % External Ointment APPLY TO AFFECTED AREA IN SCALP TWICE DAILY WITH FLARES 60 g 2    OMNIPOD 5 G6 PODS, GEN 5, Does not apply Misc USE ONE DEVICE EVERY OTHER DAY 45 each 0    Continuous Blood Gluc Sensor (DEXCOM G6 SENSOR) Does not apply Misc Change sensor every 10 days 9 each 0    Continuous Blood Gluc Transmit (DEXCOM G6 TRANSMITTER) Does not apply Misc TEST BLOOD GLUCOSE AS DIRECTED WITH DEXCOM G6 SENSOR 1 each 0    insulin lispro (HUMALOG) 100 UNIT/ML Injection Solution Inject via insulin pump. Max daily dose 80 units. 70 mL 0    ONETOUCH ULTRA In Vitro Strip Check blood sugar up to 3x daily 300 each 1    OneTouch Delica Lancets 33G Does not apply Misc Use to check blood sugar 3x daily 300 each 1    Blood Glucose Monitoring Suppl (ONETOUCH ULTRA 2) w/Device Does not apply Kit Test 3x daily 1 kit  0    ketoconazole 2 % External Shampoo Use 2-3 times weekly. Lather into scalp, beard, and face and leave on for 5 minutes before washing off. 120 mL 11    hydrocortisone 2.5 % External Cream Apply every morning and evening with flares of face rash 28 g 3    DEXCOM G6  Does not apply Device 1 Device daily. 1 each 0    Insulin Disposable Pump (OMNIPOD 5 G6 INTRO, GEN 5,) Does not apply Kit 1 each As Directed. 1 kit 0    Insulin Pen Needle (TRUEPLUS 5-BEVEL PEN NEEDLES) 32G X 4 MM Does not apply Misc USE TO INJECT INSULIN UP TO THREE TIMES DAILY 100 each 0    Vitamin D3, Cholecalciferol, (VITAMIN D3) 125 MCG (5000 UT) Oral Cap Take 1 capsule (5,000 Units total) by mouth daily.      STELARA 90 MG/ML Subcutaneous Solution Prefilled Syringe injection Inject into the skin every 3 (three) months. Takes every 2 months subcut      Dulaglutide (TRULICITY) 1.5 MG/0.5ML Subcutaneous Solution Pen-injector Inject 1.5 mg into the skin once a week. (Patient not taking: Reported on 6/20/2024) 4 mL 0    predniSONE 20 MG Oral Tab Take 2 tablets (40 mg total) by mouth daily. (Patient not taking: Reported on 7/8/2024) 28 tablet 0    montelukast 10 MG Oral Tab Take 1 tablet (10 mg total) by mouth every morning. (Patient not taking: Reported on 7/8/2024)         Objective:    PHYSICAL EXAM:  General: awake, alert, no acute distress  Skin: Skin exam was performed today including the following: scalp and face. Pertinent findings include:   - Scalp improved    ASSESSMENT & PLAN:  Pathophysiology of diagnoses discussed with patient.  Therapeutic options reviewed. Risks, benefits, and alternatives discussed with patient. Instructions reviewed at length.     #Seborrheic dermatitis  - Ketoconazole 2% shampoo. Use 3 times weekly. Lather into scalp and leave on for 5 minutes before washing off. You may use your regular shampoo or head and shoulders to wash your hair afterwards if desired.   -  Start hydrocortisone 2.5% to affected areas on  face up to twice daily Monday-Friday Take weekends off.   - Fluocinonide 0.05% twice daily to affected areas Monday-Friday.Take weekends off. Avoid use on face, breasts, groin, or axiillae.      #AGA  - Oral minoxidil 2.5mg once daily. Risks, benefits, and alternatives discussed with patient.       Return to clinic: 6  months or sooner if something concerning arises      Corbin Schwartz MD

## 2024-07-16 ENCOUNTER — TELEPHONE (OUTPATIENT)
Dept: HEMATOLOGY/ONCOLOGY | Facility: HOSPITAL | Age: 39
End: 2024-07-16

## 2024-07-16 DIAGNOSIS — D50.8 IRON DEFICIENCY ANEMIA SECONDARY TO INADEQUATE DIETARY IRON INTAKE: Primary | ICD-10-CM

## 2024-07-16 NOTE — TELEPHONE ENCOUNTER
Patient called and clarified Breann MAO ordered and extra dose of venofer. Will resume venofer every 4 weeks. Informed Breann ordered a iron studies and CBC next. Labs will be evaluated to see if needs extra iron. Patient verbalizes understanding.

## 2024-08-02 ENCOUNTER — APPOINTMENT (OUTPATIENT)
Dept: HEMATOLOGY/ONCOLOGY | Facility: HOSPITAL | Age: 39
End: 2024-08-02
Attending: INTERNAL MEDICINE
Payer: MEDICAID

## 2024-08-07 ENCOUNTER — TELEPHONE (OUTPATIENT)
Dept: ENDOCRINOLOGY CLINIC | Facility: CLINIC | Age: 39
End: 2024-08-07

## 2024-08-07 DIAGNOSIS — E11.65 TYPE 2 DIABETES MELLITUS WITH HYPERGLYCEMIA, WITH LONG-TERM CURRENT USE OF INSULIN (HCC): Primary | ICD-10-CM

## 2024-08-07 DIAGNOSIS — Z79.4 TYPE 2 DIABETES MELLITUS WITH HYPERGLYCEMIA, WITH LONG-TERM CURRENT USE OF INSULIN (HCC): Primary | ICD-10-CM

## 2024-08-07 RX ORDER — PROCHLORPERAZINE 25 MG/1
SUPPOSITORY RECTAL
Qty: 9 EACH | Refills: 1 | Status: SHIPPED | OUTPATIENT
Start: 2024-08-07

## 2024-08-07 RX ORDER — PROCHLORPERAZINE 25 MG/1
SUPPOSITORY RECTAL
Qty: 1 EACH | Refills: 1 | Status: SHIPPED | OUTPATIENT
Start: 2024-08-07

## 2024-08-07 NOTE — TELEPHONE ENCOUNTER
Spoke with pt to confirm if he needs refill for both sensors and transmitters  Rx sent per protocol for sensors and transmitters    Endocrine Refill protocol for CGM supplies     Protocol Criteria:  PASSED  Appointment with Endocrinology completed in the last 12 months or scheduled in the next 6 months     Verify appointment has been completed or scheduled in the appropriate timeline. If so can send a 90 day supply with 1 refill.     Last completed office visit:3/7/2024 Vic Moore APRN   Next scheduled Follow up:   Future Appointments   Date Time Provider Department Center   8/16/2024  4:00 PM EM [facility] INFRN 5 [facility] CHEMO EMO   8/30/2024 11:45 AM Vic Moore APRN [facility]   12/17/2024  4:00 PM Ketan Caputo MD [facility] HEM ONC EMO   1/13/2025  2:00 PM Corbin Schwartz MD ECLMBDERM [facility] [place]

## 2024-08-15 RX ORDER — CYANOCOBALAMIN 1000 UG/ML
1000 INJECTION, SOLUTION INTRAMUSCULAR; SUBCUTANEOUS ONCE
Start: 2024-08-15 | End: 2024-08-15

## 2024-08-16 ENCOUNTER — TELEPHONE (OUTPATIENT)
Dept: HEMATOLOGY/ONCOLOGY | Facility: HOSPITAL | Age: 39
End: 2024-08-16

## 2024-08-22 ENCOUNTER — APPOINTMENT (OUTPATIENT)
Dept: HEMATOLOGY/ONCOLOGY | Facility: HOSPITAL | Age: 39
End: 2024-08-22
Attending: INTERNAL MEDICINE
Payer: MEDICAID

## 2024-08-26 ENCOUNTER — OFFICE VISIT (OUTPATIENT)
Dept: HEMATOLOGY/ONCOLOGY | Facility: HOSPITAL | Age: 39
End: 2024-08-26
Attending: INTERNAL MEDICINE
Payer: MEDICAID

## 2024-08-26 VITALS
HEART RATE: 92 BPM | OXYGEN SATURATION: 100 % | DIASTOLIC BLOOD PRESSURE: 75 MMHG | TEMPERATURE: 98 F | RESPIRATION RATE: 16 BRPM | SYSTOLIC BLOOD PRESSURE: 138 MMHG

## 2024-08-26 DIAGNOSIS — K90.89 OTHER SPECIFIED INTESTINAL MALABSORPTION (HCC): ICD-10-CM

## 2024-08-26 DIAGNOSIS — D50.0 IRON DEFICIENCY ANEMIA DUE TO CHRONIC BLOOD LOSS: ICD-10-CM

## 2024-08-26 DIAGNOSIS — E53.8 B12 DEFICIENCY: ICD-10-CM

## 2024-08-26 DIAGNOSIS — D50.8 IRON DEFICIENCY ANEMIA SECONDARY TO INADEQUATE DIETARY IRON INTAKE: Primary | ICD-10-CM

## 2024-08-26 PROCEDURE — 96372 THER/PROPH/DIAG INJ SC/IM: CPT

## 2024-08-26 PROCEDURE — 96374 THER/PROPH/DIAG INJ IV PUSH: CPT

## 2024-08-26 RX ORDER — CYANOCOBALAMIN 1000 UG/ML
1000 INJECTION, SOLUTION INTRAMUSCULAR; SUBCUTANEOUS ONCE
Status: COMPLETED | OUTPATIENT
Start: 2024-08-26 | End: 2024-08-26

## 2024-08-26 RX ORDER — CYANOCOBALAMIN 1000 UG/ML
INJECTION, SOLUTION INTRAMUSCULAR; SUBCUTANEOUS
Status: COMPLETED
Start: 2024-08-26 | End: 2024-08-26

## 2024-08-26 RX ORDER — CYANOCOBALAMIN 1000 UG/ML
1000 INJECTION, SOLUTION INTRAMUSCULAR; SUBCUTANEOUS ONCE
Start: 2024-09-09 | End: 2024-09-09

## 2024-08-26 RX ADMIN — CYANOCOBALAMIN 1000 MCG: 1000 INJECTION, SOLUTION INTRAMUSCULAR; SUBCUTANEOUS at 16:24:00

## 2024-08-26 NOTE — PROGRESS NOTES
Pt here for Venofer 200mg and Vitamin B12 injection . Pt denies any issues or concerns.    B12 injected to left deltoid.    Ordering Provider: DEISI Rolle  Order Exp: 8/16/2025     Pt tolerated infusion without difficulty or complaint. Reviewed next apt date/time: 9/27 at 1600      Education Record  Learner:  Patient  Disease / Diagnosis: GAL  Barriers / Limitations:  None  Method:  Reinforcement  General Topics:  Plan of care reviewed  Outcome:  Shows understanding

## 2024-08-30 ENCOUNTER — TELEPHONE (OUTPATIENT)
Dept: ENDOCRINOLOGY CLINIC | Facility: CLINIC | Age: 39
End: 2024-08-30

## 2024-08-30 ENCOUNTER — OFFICE VISIT (OUTPATIENT)
Dept: ENDOCRINOLOGY CLINIC | Facility: CLINIC | Age: 39
End: 2024-08-30

## 2024-08-30 VITALS
WEIGHT: 174 LBS | SYSTOLIC BLOOD PRESSURE: 121 MMHG | BODY MASS INDEX: 27.31 KG/M2 | HEART RATE: 85 BPM | HEIGHT: 67 IN | DIASTOLIC BLOOD PRESSURE: 70 MMHG

## 2024-08-30 DIAGNOSIS — E11.65 TYPE 2 DIABETES MELLITUS WITH HYPERGLYCEMIA, WITH LONG-TERM CURRENT USE OF INSULIN (HCC): Primary | ICD-10-CM

## 2024-08-30 DIAGNOSIS — Z79.4 TYPE 2 DIABETES MELLITUS WITH HYPERGLYCEMIA, WITH LONG-TERM CURRENT USE OF INSULIN (HCC): Primary | ICD-10-CM

## 2024-08-30 LAB
CARTRIDGE EXPIRATION DATE: ABNORMAL DATE
GLUCOSE BLOOD: 161
HEMOGLOBIN A1C: 7.7 % (ref 4.3–5.6)
TEST STRIP EXPIRATION DATE: NORMAL DATE
TEST STRIP LOT #: NORMAL NUMERIC

## 2024-08-30 RX ORDER — INSULIN LISPRO 100 [IU]/ML
INJECTION, SOLUTION INTRAVENOUS; SUBCUTANEOUS
Qty: 72 ML | Refills: 0 | Status: SHIPPED | OUTPATIENT
Start: 2024-08-30

## 2024-08-30 RX ORDER — DULAGLUTIDE 0.75 MG/.5ML
0.75 INJECTION, SOLUTION SUBCUTANEOUS WEEKLY
Qty: 6 ML | Refills: 0 | Status: SHIPPED | OUTPATIENT
Start: 2024-08-30

## 2024-08-30 RX ORDER — INSULIN PMP CART,AUT,G6/7,CNTR
1 EACH SUBCUTANEOUS
Qty: 9 EACH | Refills: 1 | Status: SHIPPED | OUTPATIENT
Start: 2024-08-30

## 2024-08-30 RX ORDER — ATORVASTATIN CALCIUM 10 MG/1
10 TABLET, FILM COATED ORAL NIGHTLY
Qty: 90 TABLET | Refills: 0 | Status: SHIPPED | OUTPATIENT
Start: 2024-08-30

## 2024-08-30 RX ORDER — PROCHLORPERAZINE 25 MG/1
SUPPOSITORY RECTAL
Qty: 9 EACH | Refills: 2 | Status: SHIPPED | OUTPATIENT
Start: 2024-08-30

## 2024-08-30 NOTE — TELEPHONE ENCOUNTER
Pharmacy states medication not covered. Please call [phone] to initiate pa. Patient ID is [id number]        Insulin Disposable Pump (OMNIPOD 5 G6 PODS, GEN 5,) Does not apply Misc, Inject 1 each into the skin every other day. Please dispense 9 boxes of 5 pods in each. Total 45 pods., Disp: 9 each, Rfl: 1

## 2024-08-30 NOTE — PROGRESS NOTES
You 8/30/2024 8:01 AM    -----------------------------  Dexcom Clarity  -----------------------------  Ariel Hunter  YOB: 1985  Generated at: Fri, Aug 30, 2024 8:00 AM CDT  Reporting period: Sat Aug 17, 2024 - Fri Aug 30, 2024  -----------------------------  Glucose Details  Average glucose: 225 mg/dL  Standard deviation: 67 mg/dL  GMI: N/A  -----------------------------  Time in Range  Very High: 36%  High: 34%  In Range: 30%  Low: 0%  Very Low: 0%    Target Range   mg/dL    -----------------------------  CGM Details  Sensor usage: 86%  Days with CGM data: 12/14

## 2024-08-30 NOTE — PATIENT INSTRUCTIONS
A1C: 7.7% today --> increased from 7.0% on 3/7/2024  Blood glucose: 161 in clinic today    Medications:   -continue with Metformin 1,000mg twice daily  - start Trulicity 0.75mg once weekly   - start atorvastatin 10mg nightly   - start Omega 3 fish oil 1,000mg daily       Omnipod 5   Basal rate:  12A 1.0    ICR   12A 4 --> 3  10A 6  4P 4 --> 3    Target Glucose: 120 --> 110  Correction Factor: 33 --> 38  Active Insulin Time: 2.5 --> 2hrs      A1C goal:  <7.0%    Blood sugar testing:  Continue using Dexcom G6 continuous glucometer     Blood sugar targets:  Before breakfast:   (preferably < 110)  Before meals OR 2 hours after meals: <180 (preferably <150)     Call for persistent blood sugars < 75 or > 200

## 2024-08-30 NOTE — PROGRESS NOTES
Name: Ariel Hunter  Date: 8/30/2024    CHIEF COMPLAINT   Chief Complaint   Patient presents with    Diabetes     A1C check     HISTORY OF PRESENT ILLNESS   Ariel Hunter is a 38 year old male who presents for follow up on diabetes management.   HbA1C: 7.7% at POC today.  This is an increase from 7.0% on 3/7/2024.   Blood glucose is: 161 in clinic today.  Since last office visit, he has had 2 bowl obstruction episodes in which have lead to hospitalizations. He is now taking Stellara and antivio for Crohn's disease. He is followed by Dr. Amaury Villalta with [facility]. He is informed that he may need surgery in the future, however he is still under evaluation with new medications currently.     FAMILY HISTORY OF DIABETES  -father and mother   - paternal family   -maternal grandfather   DIABETES HISTORY  Diagnosed: around age 32   - transitioned to Omnipod 5 insulin pump in 10/2023    Prior HbA, C or glycohemoglobin were 8.2% 3/8/2022; 8.5% 7/14/2022; 8.5% 9/15/2022; 8.7% at POC today; 10.6% 3/2/2023; 10.4% 8/14/2023; 7.6% 11/16/2023; 7.0% 3/7/2024; 7.7% at POC today;     Patient has not had hospitalizations for blood sugar issues and denies any history of pancreatitis    PREVIOUS MEDICATION FOR DM:  -Januvia -d/c'ed due to transition to MDI  - Glipizide -d/c'ed due to transition to O5  -Trulicity - was never started due to RANDY    CURRENT MEDICATIONS FOR DM:  Metformin 1,000mg twice daily     Omnipod 5   Basal rate:  12A 1.0    ICR   12A 4 --> 3  10A 6  4P 4 --> 3    Target Glucose: 120 --> 110  Correction Factor: 33 --> 38  Active Insulin Time: 2.5hrs --> 2hrs    Insulin:  72% basal= 44.7 units   28% bolus= 17.3 units   Insulin /day= 61.9 units   # bolus/day= 2.5    Insulin pump interpretation:  - post prandial hyperglycemia noted most often occurring with breakfast/dinner meals.   - glucose readings stable between meals   - pump suspension was noted between meals and overnight/early morning     HISTORY OF DIABETES  COMPLICATIONS:  History of Retinopathy: denies - last eye exam within the last 12 months: yes    History of Neuropathy: denies   History of Nephropathy: no     ASSOCIATED COMPLICATIONS:   HTN: no  Hyperlipidemia: no   Cardiovascular Disease: no   Peripheral Vascular Disease: no     DIETARY COMPLIANCE:  Fair; tries to eat a low carb diet     EXERCISE:   No- however he stays active by walking. No changes     Polyuria, polyphagia, polydipsia: no   Paresthesias: no   Blurred vision: no   Recent steroids, illness or infections: no     REVIEW OF SYSTEMS  Constitutional: Negative for: weight change, fever, fatigue, cold/heat intolerance  Eyes: Negative for:  Visual changes, proptosis, blurring  ENT: Negative for:  dysphagia, neck swelling, dysphonia  Respiratory: Negative for: hemoptysis, shortness of breath, cough, or dyspnea.  Cardiovascular: Negative for:  chest pain, chest discomfort, palpitations  GI: Negative for:  abdominal pain, nausea, vomiting, diarrhea, heartburn, constipation  Neurology: Negative for: headache, dizziness, syncope, numbness/tingling, or weakness.   Genito-Urinary: Negative for: dysuria, frequency or hematuria   Hematology/Lymphatics: Negative for: bruising, easy bleeding, lower extremity edema  Skin: Negative for: rash, blister, infection or ulcers.  Endocrine: Negative for: polyuria, polydipsia. No osteoporosis. No thyroid disease.     MEDICATIONS:     Current Outpatient Medications:     Continuous Glucose Transmitter (DEXCOM G6 TRANSMITTER) Does not apply Misc, TEST BLOOD GLUCOSE AS DIRECTED WITH DEXCOM G6 SENSOR, Disp: 1 each, Rfl: 1    Continuous Glucose Sensor (DEXCOM G6 SENSOR) Does not apply Misc, Change sensor every 10 days, Disp: 9 each, Rfl: 1    Insulin Lispro, 1 Unit Dial, 100 UNIT/ML Subcutaneous Solution Pen-injector, INJECT 80 UNITS VIA INSULIN PUMP EVERY DAY, Disp: , Rfl:     metFORMIN HCl 1000 MG Oral Tab, Take 1 tablet (1,000 mg total) by mouth 2 (two) times daily with meals.,  Disp: 90 tablet, Rfl: 1    OMNIPOD 5 G6 PODS, GEN 5, Does not apply Misc, USE ONE DEVICE EVERY OTHER DAY, Disp: 45 each, Rfl: 0    ONETOUCH ULTRA In Vitro Strip, Check blood sugar up to 3x daily, Disp: 300 each, Rfl: 1    Blood Glucose Monitoring Suppl (ONETOUCH ULTRA 2) w/Device Does not apply Kit, Test 3x daily, Disp: 1 kit, Rfl: 0    DEXCOM G6  Does not apply Device, 1 Device daily., Disp: 1 each, Rfl: 0    Insulin Disposable Pump (OMNIPOD 5 G6 INTRO, GEN 5,) Does not apply Kit, 1 each As Directed., Disp: 1 kit, Rfl: 0    Insulin Pen Needle (TRUEPLUS 5-BEVEL PEN NEEDLES) 32G X 4 MM Does not apply Misc, USE TO INJECT INSULIN UP TO THREE TIMES DAILY, Disp: 100 each, Rfl: 0    hydrocortisone 2.5 % External Cream, Apply every morning and evening with flares of face rash, Disp: 28 g, Rfl: 3    ketoconazole 2 % External Shampoo, Use 2-3 times weekly. Lather into scalp, beard, and face and leave on for 5 minutes before washing off., Disp: 120 mL, Rfl: 11    fluocinonide 0.05 % External Ointment, APPLY TO AFFECTED AREA IN SCALP TWICE DAILY WITH FLARES, Disp: 60 g, Rfl: 2    minoxidil 2.5 MG Oral Tab, Take 1 tablet (2.5 mg total) by mouth daily., Disp: 180 tablet, Rfl: 0    insulin lispro (HUMALOG) 100 UNIT/ML Injection Solution, Inject via insulin pump. Max daily dose 80 units. (Patient not taking: Reported on 8/30/2024), Disp: 70 mL, Rfl: 0    OneTouch Delica Lancets 33G Does not apply Misc, Use to check blood sugar 3x daily, Disp: 300 each, Rfl: 1    Dulaglutide (TRULICITY) 1.5 MG/0.5ML Subcutaneous Solution Pen-injector, Inject 1.5 mg into the skin once a week. (Patient not taking: Reported on 6/20/2024), Disp: 4 mL, Rfl: 0    predniSONE 20 MG Oral Tab, Take 2 tablets (40 mg total) by mouth daily. (Patient not taking: Reported on 7/8/2024), Disp: 28 tablet, Rfl: 0    montelukast 10 MG Oral Tab, Take 1 tablet (10 mg total) by mouth every morning. (Patient not taking: Reported on 7/8/2024), Disp: , Rfl:      Vitamin D3, Cholecalciferol, (VITAMIN D3) 125 MCG (5000 UT) Oral Cap, Take 1 capsule (5,000 Units total) by mouth daily., Disp: , Rfl:     STELARA 90 MG/ML Subcutaneous Solution Prefilled Syringe injection, Inject into the skin every 3 (three) months. Takes every 2 months subcut, Disp: , Rfl:     ALLERGIES:   Allergies   Allergen Reactions    Infed [Iron Dextran] HIVES    Ciprofloxacin      Other reaction(s): burning urination       SOCIAL HISTORY:   Social History     Socioeconomic History    Marital status:    Tobacco Use    Smoking status: Never     Passive exposure: Never    Smokeless tobacco: Never   Substance and Sexual Activity    Alcohol use: Never    Drug use: Never   Other Topics Concern    Reaction to local anesthetic No    Pt has a pacemaker No    Pt has a defibrillator No       PAST MEDICAL HISTORY:   Past Medical History:    Asthma, cough variant (HCC)    Calculus of kidney    Crohn disease (HCC)    Crohn's disease (HCC)    Diabetes (HCC)    Iron deficiency anemia       PAST SURGICAL HISTORY:   Past Surgical History:   Procedure Laterality Date    Colonoscopy N/A 6/12/2017    Procedure: COLONOSCOPY;  Surgeon: Naomi Moffett MD;  Location: [facility] ENDOSCOPY    Colostomy         PHYSICAL EXAM:   Vitals:    08/30/24 1141   BP: 121/70   Pulse: 85   Weight: 174 lb (78.9 kg)   Height: 5' 7\" (1.702 m)     BMI:   Body mass index is 27.25 kg/m².    General Appearance:  alert, well developed, in no acute distress  Nutritional:  no extreme weight gain or loss  Head: Atraumatic  Eyes:  normal conjunctivae, sclera., normal sclera and normal pupils  Throat/Neck: normal sound to voice. Normal hearing, normal speech  Back: no kyphosis  Respiratory:  Speaking in full sentences, non-labored. no increased work of breathing, no audible wheezing    Skin:  normal moisture and skin texture, no visible lesions  Hair and nails: normal scalp hair  Hematologic:  no excessive bruising  Neuro: motor grossly intact, moving all  extremities without difficulty  Psychiatric:  oriented to time, self, and place   Extremities: no obvious extremity swelling, no lesions    LABS: Pertinent labs reviewed    ASSESSMENT/PLAN:    -Reviewed with patient the pathogenesis of diabetes, clinical significance of A1c, and common complications such as: microvascular, macrovascular and diabetic ketoacidosis. Patient verbalizes understanding of the importance of glycemic control and the goals of therapy.   -Discussed with patient glucose targets ranges (Fasting  and post prandial <180).     1.Type 2 Diabetes Mellitus, uncontrolled   -LAB DATA  HbA,C: 7.7% today   a) Medications  -continue with Metformin 1,000mg twice daily  - start Trulicity 0.75mg once weekly - per patient, Dr. Amaury Villalta is ok with this and belives it would help his CD.   - insulin pump settings adjusted per above     -unable to use sglt-2 due to history of renal stones   -discussed to continue with low carb diet.   - discussed to continue to stay active as much as safely able   -reviewed target goal BG readings and A1C  -reviewed when to notify me of abnormal BG readings.     b) No nephropathy: GFR: 93 on 2024 --> reminded to repeat urine MA   c) Utd with optho   d) Foot exam: normal on 3/7/2024  e) cont. using Dexcom G6 continuous glucometer   f) Life style changes reviewed.     2. Blood Pressure Management   -normotensive today      3.Lipids Management   -LDL: 55 and tri on 2024  - start atorvastatin 10mg nightly - CV protection   - start omega 3 fish oil 1,000mg daily - to help with also liver enzymes       RTC in 3 months   Patient instructed to call sooner if they develop Blood glucose readings <75 and/or if they have readings persistently >200.     The risks and benefits of my recommendations were discussed with the patient today. questions were also answered to the best of my knowledge. Patient verbalizes understanding of these issues and agrees to the  plan.    8/30/2024  MAYCO Perry

## 2024-09-06 NOTE — TELEPHONE ENCOUNTER
Received fax from [facility] in regards to Omnipod 5 G6 pods. Medication has been approved from 06/07/2024 to 09/05/2025. [employer]om[employer]t message sent to pt and approval letter sent to scanning.     Approval # -  [id number]

## 2024-09-13 ENCOUNTER — APPOINTMENT (OUTPATIENT)
Dept: HEMATOLOGY/ONCOLOGY | Facility: HOSPITAL | Age: 39
End: 2024-09-13
Attending: INTERNAL MEDICINE
Payer: MEDICAID

## 2024-09-19 ENCOUNTER — APPOINTMENT (OUTPATIENT)
Dept: HEMATOLOGY/ONCOLOGY | Facility: HOSPITAL | Age: 39
End: 2024-09-19
Attending: INTERNAL MEDICINE
Payer: MEDICAID

## 2024-09-26 ENCOUNTER — TELEPHONE (OUTPATIENT)
Dept: FAMILY MEDICINE CLINIC | Facility: CLINIC | Age: 39
End: 2024-09-26

## 2024-09-26 RX ORDER — ATORVASTATIN CALCIUM 10 MG/1
10 TABLET, FILM COATED ORAL NIGHTLY
Qty: 90 TABLET | Refills: 0 | Status: SHIPPED | OUTPATIENT
Start: 2024-09-26

## 2024-09-26 NOTE — TELEPHONE ENCOUNTER
Prior authorization needed:    Current Outpatient Medications   Medication Sig Dispense Refill    Continuous Glucose Transmitter (DEXCOM G6 TRANSMITTER) Does not apply Misc TEST BLOOD GLUCOSE AS DIRECTED WITH DEXCOM G6 SENSOR 1 each 1     Message: \"Plan does not cover this medication. Please call plan [phone] to initiate prior authorization or call store at [phone] or fax [phone] to change medication. Patient ID# is [id number]\"    Please advise

## 2024-09-26 NOTE — TELEPHONE ENCOUNTER
Endocrine refill protocol for lipid lowering medications    Protocol Criteria:  PASSED     If all below requirements are met, send a 90-day supply with 1 refill per provider protocol.    Verify appointment with Endocrinology completed in the last 6 months or scheduled in the next 3 months.  Lipid panel must have been completed in the last 12 months   ALT result below 80  LDL result below 130    Last completed office visit:8/30/2024 Vic Moore APRN   Next scheduled Follow up:   Future Appointments   Date Time Provider Department Center   12/3/2024  2:30 PM Vic Moore APRN [facility]      Last Lipid panel date: Cholesterol: 100, done on 12/5/2020.  HDL Cholesterol: 31, done on 12/5/2020.  TriGlycerides 76, done on 12/5/2020.  LDL Cholesterol: 54, done on 12/5/2020.     Last ALT result: Last ALT was 21 done on 11/28/2022.  Last AST was 12 done on 11/28/2022.       Lipid panel  Order: [id number]  Component  Ref Range & Units 8/1/24  2:14 PM   CHOLESTEROL  <200 MG/   HDL  >40 MG/DL 29 Low    TRIGLYCERIDE  <150 MG/ High    LDL, CALCULATED  <130 MG/DL 55

## 2024-09-27 ENCOUNTER — OFFICE VISIT (OUTPATIENT)
Dept: HEMATOLOGY/ONCOLOGY | Facility: HOSPITAL | Age: 39
End: 2024-09-27
Attending: INTERNAL MEDICINE
Payer: MEDICAID

## 2024-09-27 VITALS
SYSTOLIC BLOOD PRESSURE: 138 MMHG | DIASTOLIC BLOOD PRESSURE: 66 MMHG | TEMPERATURE: 99 F | HEART RATE: 96 BPM | OXYGEN SATURATION: 100 % | RESPIRATION RATE: 16 BRPM

## 2024-09-27 DIAGNOSIS — D50.8 IRON DEFICIENCY ANEMIA SECONDARY TO INADEQUATE DIETARY IRON INTAKE: Primary | ICD-10-CM

## 2024-09-27 DIAGNOSIS — K90.89 OTHER SPECIFIED INTESTINAL MALABSORPTION (HCC): ICD-10-CM

## 2024-09-27 DIAGNOSIS — E53.8 B12 DEFICIENCY: ICD-10-CM

## 2024-09-27 DIAGNOSIS — D50.0 IRON DEFICIENCY ANEMIA DUE TO CHRONIC BLOOD LOSS: ICD-10-CM

## 2024-09-27 PROCEDURE — 96374 THER/PROPH/DIAG INJ IV PUSH: CPT

## 2024-09-27 PROCEDURE — 96372 THER/PROPH/DIAG INJ SC/IM: CPT

## 2024-09-27 RX ORDER — CYANOCOBALAMIN 1000 UG/ML
1000 INJECTION, SOLUTION INTRAMUSCULAR; SUBCUTANEOUS ONCE
Start: 2024-10-25 | End: 2024-10-25

## 2024-09-27 RX ORDER — CYANOCOBALAMIN 1000 UG/ML
1000 INJECTION, SOLUTION INTRAMUSCULAR; SUBCUTANEOUS ONCE
Status: COMPLETED | OUTPATIENT
Start: 2024-09-27 | End: 2024-09-27

## 2024-09-27 RX ORDER — CYANOCOBALAMIN 1000 UG/ML
INJECTION, SOLUTION INTRAMUSCULAR; SUBCUTANEOUS
Status: COMPLETED
Start: 2024-09-27 | End: 2024-09-27

## 2024-09-27 RX ADMIN — CYANOCOBALAMIN 1000 MCG: 1000 INJECTION, SOLUTION INTRAMUSCULAR; SUBCUTANEOUS at 16:29:00

## 2024-09-27 NOTE — TELEPHONE ENCOUNTER
Medication PA Requested:  DEXCOM G6 SENSOR                                                         CoverMyMeds Used:  Key:  Quantity: 9 each   Day Supply: 90  Sig:  Change sensor every 10 days    DX Code:   E11.65

## 2024-09-27 NOTE — PROGRESS NOTES
Presents for every 4 week venofer 200mg and b12 injection. Offers no complaints. PIV started with good blood return.  Venofer Given slow IVP via side port of a free flowing bag of 0.9NS.    B12 given to left deltoid-tolerated well.      Discharged stable.    Aware of future appointments.

## 2024-10-03 NOTE — TELEPHONE ENCOUNTER
Medication PA Requested:  DEXCOM G6 SENSOR                                                         CoverMyMeds Used: No  Key:  Quantity: 9 each   Day Supply: 90  Sig:  Change sensor every 10 days    DX Code:   E11.65       Electronic prior authorization submitted, last office visit 8/30 and A1C 8/30  Awaiting determination

## 2024-10-08 NOTE — TELEPHONE ENCOUNTER
Received fax from [facility] stating an approval for Dexcom G6 sensor miscellaneous, Dexcom G6 transmitter miscellaneous, Dexcom G6  dec=vice effective 7/5/2024 and ends 10/3/2025.     Sent fax to scanning.

## 2024-10-25 ENCOUNTER — OFFICE VISIT (OUTPATIENT)
Dept: HEMATOLOGY/ONCOLOGY | Facility: HOSPITAL | Age: 39
End: 2024-10-25
Attending: INTERNAL MEDICINE
Payer: MEDICAID

## 2024-10-25 VITALS
DIASTOLIC BLOOD PRESSURE: 80 MMHG | SYSTOLIC BLOOD PRESSURE: 129 MMHG | HEART RATE: 91 BPM | WEIGHT: 178.31 LBS | TEMPERATURE: 98 F | BODY MASS INDEX: 28 KG/M2 | OXYGEN SATURATION: 100 % | RESPIRATION RATE: 18 BRPM

## 2024-10-25 DIAGNOSIS — D50.0 IRON DEFICIENCY ANEMIA DUE TO CHRONIC BLOOD LOSS: ICD-10-CM

## 2024-10-25 DIAGNOSIS — E53.8 B12 DEFICIENCY: ICD-10-CM

## 2024-10-25 DIAGNOSIS — D50.8 IRON DEFICIENCY ANEMIA SECONDARY TO INADEQUATE DIETARY IRON INTAKE: Primary | ICD-10-CM

## 2024-10-25 DIAGNOSIS — K90.89 OTHER SPECIFIED INTESTINAL MALABSORPTION (HCC): ICD-10-CM

## 2024-10-25 PROCEDURE — 96372 THER/PROPH/DIAG INJ SC/IM: CPT

## 2024-10-25 PROCEDURE — 96374 THER/PROPH/DIAG INJ IV PUSH: CPT

## 2024-10-25 RX ORDER — CYANOCOBALAMIN 1000 UG/ML
1000 INJECTION, SOLUTION INTRAMUSCULAR; SUBCUTANEOUS ONCE
Start: 2024-11-22 | End: 2024-11-22

## 2024-10-25 RX ORDER — CYANOCOBALAMIN 1000 UG/ML
INJECTION, SOLUTION INTRAMUSCULAR; SUBCUTANEOUS
Status: COMPLETED
Start: 2024-10-25 | End: 2024-10-25

## 2024-10-25 RX ORDER — CYANOCOBALAMIN 1000 UG/ML
1000 INJECTION, SOLUTION INTRAMUSCULAR; SUBCUTANEOUS ONCE
Status: COMPLETED | OUTPATIENT
Start: 2024-10-25 | End: 2024-10-25

## 2024-10-25 RX ADMIN — CYANOCOBALAMIN 1000 MCG: 1000 INJECTION, SOLUTION INTRAMUSCULAR; SUBCUTANEOUS at 16:37:00

## 2024-10-25 NOTE — PROGRESS NOTES
Ariel to infusion today for q4wk Venofer 200 and B12 injection. Pt denies any issues or concerns.      Ordering Provider: Harshal Burnett Exp: 8/16/25     Pt tolerated infusion without difficulty or complaint. Reviewed next apt date/time: 11/22 @ 4p      Education Record  Learner:  Patient  Disease / Diagnosis: GAL + B12 deficiency  Barriers / Limitations:  None  Method:  Reinforcement  General Topics:  Plan of care reviewed  Outcome:  Shows understanding

## 2024-11-22 ENCOUNTER — APPOINTMENT (OUTPATIENT)
Dept: HEMATOLOGY/ONCOLOGY | Facility: HOSPITAL | Age: 39
End: 2024-11-22
Attending: INTERNAL MEDICINE
Payer: MEDICAID

## 2024-12-17 ENCOUNTER — OFFICE VISIT (OUTPATIENT)
Age: 39
End: 2024-12-17
Attending: INTERNAL MEDICINE
Payer: MEDICAID

## 2024-12-17 ENCOUNTER — APPOINTMENT (OUTPATIENT)
Dept: GENERAL RADIOLOGY | Age: 39
End: 2024-12-17
Attending: NURSE PRACTITIONER
Payer: MEDICAID

## 2024-12-17 ENCOUNTER — HOSPITAL ENCOUNTER (OUTPATIENT)
Age: 39
Discharge: HOME | End: 2024-12-17
Payer: MEDICAID

## 2024-12-17 VITALS
WEIGHT: 173.19 LBS | OXYGEN SATURATION: 100 % | RESPIRATION RATE: 16 BRPM | TEMPERATURE: 98 F | SYSTOLIC BLOOD PRESSURE: 125 MMHG | BODY MASS INDEX: 27 KG/M2 | HEART RATE: 93 BPM | DIASTOLIC BLOOD PRESSURE: 78 MMHG

## 2024-12-17 VITALS
OXYGEN SATURATION: 100 % | SYSTOLIC BLOOD PRESSURE: 129 MMHG | RESPIRATION RATE: 16 BRPM | DIASTOLIC BLOOD PRESSURE: 72 MMHG | HEART RATE: 84 BPM | TEMPERATURE: 98 F

## 2024-12-17 VITALS
DIASTOLIC BLOOD PRESSURE: 78 MMHG | WEIGHT: 173.19 LBS | RESPIRATION RATE: 16 BRPM | BODY MASS INDEX: 27.18 KG/M2 | TEMPERATURE: 98 F | SYSTOLIC BLOOD PRESSURE: 117 MMHG | OXYGEN SATURATION: 100 % | HEART RATE: 82 BPM | HEIGHT: 67 IN

## 2024-12-17 DIAGNOSIS — S91.134A: Primary | ICD-10-CM

## 2024-12-17 DIAGNOSIS — E53.8 B12 DEFICIENCY: ICD-10-CM

## 2024-12-17 DIAGNOSIS — K90.89 OTHER SPECIFIED INTESTINAL MALABSORPTION (HCC): ICD-10-CM

## 2024-12-17 DIAGNOSIS — D50.8 IRON DEFICIENCY ANEMIA SECONDARY TO INADEQUATE DIETARY IRON INTAKE: Primary | ICD-10-CM

## 2024-12-17 DIAGNOSIS — K90.9 IRON MALABSORPTION (HCC): ICD-10-CM

## 2024-12-17 DIAGNOSIS — D50.0 IRON DEFICIENCY ANEMIA DUE TO CHRONIC BLOOD LOSS: ICD-10-CM

## 2024-12-17 DIAGNOSIS — Z51.81 MEDICATION MONITORING ENCOUNTER: ICD-10-CM

## 2024-12-17 LAB
BASOPHILS # BLD AUTO: 0.04 X10(3) UL (ref 0–0.2)
BASOPHILS NFR BLD AUTO: 0.7 %
DEPRECATED HBV CORE AB SER IA-ACNC: 4 NG/ML
DEPRECATED RDW RBC AUTO: 40.1 FL (ref 35.1–46.3)
EOSINOPHIL # BLD AUTO: 0.1 X10(3) UL (ref 0–0.7)
EOSINOPHIL NFR BLD AUTO: 1.8 %
ERYTHROCYTE [DISTWIDTH] IN BLOOD BY AUTOMATED COUNT: 21.9 % (ref 11–15)
HCT VFR BLD AUTO: 27.8 %
HGB BLD-MCNC: 7.5 G/DL
IMM GRANULOCYTES # BLD AUTO: 0.02 X10(3) UL (ref 0–1)
IMM GRANULOCYTES NFR BLD: 0.4 %
IRON SATN MFR SERPL: 2 %
IRON SERPL-MCNC: 11 UG/DL
LYMPHOCYTES # BLD AUTO: 1.99 X10(3) UL (ref 1–4)
LYMPHOCYTES NFR BLD AUTO: 35.3 %
MCH RBC QN AUTO: 14.8 PG (ref 26–34)
MCHC RBC AUTO-ENTMCNC: 27 G/DL (ref 31–37)
MCV RBC AUTO: 54.9 FL
MONOCYTES # BLD AUTO: 0.44 X10(3) UL (ref 0.1–1)
MONOCYTES NFR BLD AUTO: 7.8 %
NEUTROPHILS # BLD AUTO: 3.04 X10 (3) UL (ref 1.5–7.7)
NEUTROPHILS # BLD AUTO: 3.04 X10(3) UL (ref 1.5–7.7)
NEUTROPHILS NFR BLD AUTO: 54 %
PLATELET # BLD AUTO: 304 10(3)UL (ref 150–450)
PLATELET MORPHOLOGY: NORMAL
PLATELETS.RETICULATED NFR BLD AUTO: 5.6 % (ref 0–7)
RBC # BLD AUTO: 5.06 X10(6)UL
TIBC SERPL-MCNC: 483 UG/DL (ref 250–425)
TRANSFERRIN SERPL-MCNC: 324 MG/DL (ref 215–365)
WBC # BLD AUTO: 5.6 X10(3) UL (ref 4–11)

## 2024-12-17 PROCEDURE — 73630 X-RAY EXAM OF FOOT: CPT | Performed by: NURSE PRACTITIONER

## 2024-12-17 PROCEDURE — 99213 OFFICE O/P EST LOW 20 MIN: CPT | Performed by: NURSE PRACTITIONER

## 2024-12-17 RX ORDER — FOLIC ACID 1 MG/1
1 TABLET ORAL DAILY
Qty: 90 TABLET | Refills: 3 | Status: SHIPPED | OUTPATIENT
Start: 2024-12-17

## 2024-12-17 RX ORDER — CYANOCOBALAMIN 1000 UG/ML
1000 INJECTION, SOLUTION INTRAMUSCULAR; SUBCUTANEOUS ONCE
Status: COMPLETED | OUTPATIENT
Start: 2024-12-17 | End: 2024-12-17

## 2024-12-17 RX ORDER — CEPHALEXIN 500 MG/1
500 CAPSULE ORAL 4 TIMES DAILY
Qty: 28 CAPSULE | Refills: 0 | Status: SHIPPED | OUTPATIENT
Start: 2024-12-17 | End: 2024-12-24

## 2024-12-17 RX ORDER — CYANOCOBALAMIN 1000 UG/ML
1000 INJECTION, SOLUTION INTRAMUSCULAR; SUBCUTANEOUS ONCE
Start: 2025-01-14 | End: 2025-01-14

## 2024-12-17 RX ORDER — CYANOCOBALAMIN 1000 UG/ML
INJECTION, SOLUTION INTRAMUSCULAR; SUBCUTANEOUS
Status: COMPLETED
Start: 2024-12-17 | End: 2024-12-17

## 2024-12-17 RX ADMIN — CYANOCOBALAMIN 1000 MCG: 1000 INJECTION, SOLUTION INTRAMUSCULAR; SUBCUTANEOUS at 15:52:00

## 2024-12-17 NOTE — PROGRESS NOTES
Patient arrives to infusion for B12 injection and Venofer 200mg . Patient denies any issues or concerns. Labs drawn and sent to lab.     Ordering Provider: DEISI Crain     Patient appeared to tolerate infusion without difficulty or complaint. No s/s of adverse reaction noted. Reviewed next apt date/time: yes    Education Record  Learner:  Patient  Disease / Diagnosis: GAL  Barriers / Limitations:  None  Method:  Discussion  General Topics:  Plan of care reviewed  Outcome:  Shows understanding

## 2024-12-17 NOTE — PROGRESS NOTES
FLOR     Ariel Hunter is a 39 year old male who is here today for follow-up of of   Encounter Diagnoses   Name Primary?    Iron deficiency anemia secondary to inadequate dietary iron intake Yes    Iron deficiency anemia due to chronic blood loss     B12 deficiency     Iron malabsorption (HCC)     Medication monitoring encounter           Iron therapy:  by intravenous injection.  The patient had reaction to iron dextran.  Therefore, he completed his course of intravenous iron therapy with 5 doses of 200 mg of Venofer each, this was on 05/24/21.  He is on maintenance venofer 200mg monthly.  Last dose today.    He is also receiving intramuscular B12 injections which he is tolerating well.    Not taking the folic acid.      Symptoms:  Fatigue:   feels more tired at the end of the day.  CH/SOB:   some CH if moves too fast  CP and/or palpitations:   none  related to anemia.  Headaches:   none  Dizziness or lightheadedness:   none  Pica:   none      Stepped on a nail that went through his shoe.  Not sure when he had his last tetanus shot.  Some pain there.     States was having leg cramps at night, states now seldom.    States Chron's was not well controlled and was having lot of small bowel obstructions.  Now on two agents for the colitis and has been on 6 months of these agents w/o any obstruction.     Review of Systems:   Review of Systems   Constitutional:  Positive for fatigue and fever. Negative for appetite change and unexpected weight change.   Respiratory:  Positive for cough (for a couple of weeks.  No productive sputum.) and shortness of breath.    Cardiovascular:  Positive for chest pain (on the R when coughing, it was on the L previously.). Negative for palpitations.   Gastrointestinal:  Positive for abdominal pain (some times.).        Sometimes GERD.  Feels that has GI slow transit.    Musculoskeletal:  Negative for myalgias.   Neurological:  Negative for dizziness, headaches, light-headedness and  numbness.   Hematological:  Does not bruise/bleed easily.   Psychiatric/Behavioral:  Negative for sleep disturbance.            Current Outpatient Medications   Medication Sig Dispense Refill    ATORVASTATIN 10 MG Oral Tab TAKE 1 TABLET(10 MG) BY MOUTH EVERY NIGHT 90 tablet 0    Dulaglutide (TRULICITY) 0.75 MG/0.5ML Subcutaneous Solution Pen-injector Inject 0.75 mg into the skin once a week. 6 mL 0    Continuous Glucose Sensor (DEXCOM G6 SENSOR) Does not apply Misc Change sensor every 10 days 9 each 2    Insulin Disposable Pump (OMNIPOD 5 G6 PODS, GEN 5,) Does not apply Misc Inject 1 each into the skin every other day. Please dispense 9 boxes of 5 pods in each. Total 45 pods. 9 each 1    Insulin Lispro, 1 Unit Dial, 100 UNIT/ML Subcutaneous Solution Pen-injector INJECT 80 UNITS VIA INSULIN PUMP EVERY DAY. 72 mL 0    metFORMIN HCl 1000 MG Oral Tab Take 1 tablet (1,000 mg total) by mouth 2 (two) times daily with meals. 180 tablet 1    Continuous Glucose Transmitter (DEXCOM G6 TRANSMITTER) Does not apply Misc TEST BLOOD GLUCOSE AS DIRECTED WITH DEXCOM G6 SENSOR 1 each 1    hydrocortisone 2.5 % External Cream Apply every morning and evening with flares of face rash 28 g 3    ketoconazole 2 % External Shampoo Use 2-3 times weekly. Lather into scalp, beard, and face and leave on for 5 minutes before washing off. 120 mL 11    fluocinonide 0.05 % External Ointment APPLY TO AFFECTED AREA IN SCALP TWICE DAILY WITH FLARES 60 g 2    minoxidil 2.5 MG Oral Tab Take 1 tablet (2.5 mg total) by mouth daily. 180 tablet 0    ONETOUCH ULTRA In Vitro Strip Check blood sugar up to 3x daily 300 each 1    OneTouch Delica Lancets 33G Does not apply Misc Use to check blood sugar 3x daily 300 each 1    Blood Glucose Monitoring Suppl (ONETOUCH ULTRA 2) w/Device Does not apply Kit Test 3x daily 1 kit 0    DEXCOM G6  Does not apply Device 1 Device daily. 1 each 0    Insulin Disposable Pump (OMNIPOD 5 G6 INTRO, GEN 5,) Does not apply Kit  1 each As Directed. 1 kit 0    Insulin Pen Needle (TRUEPLUS 5-BEVEL PEN NEEDLES) 32G X 4 MM Does not apply Misc USE TO INJECT INSULIN UP TO THREE TIMES DAILY 100 each 0    Vitamin D3, Cholecalciferol, (VITAMIN D3) 125 MCG (5000 UT) Oral Cap Take 1 capsule (5,000 Units total) by mouth daily.      STELARA 90 MG/ML Subcutaneous Solution Prefilled Syringe injection Inject into the skin every 3 (three) months. Takes every 2 months subcut      predniSONE 20 MG Oral Tab Take 2 tablets (40 mg total) by mouth daily. (Patient not taking: Reported on 12/17/2024) 28 tablet 0    montelukast 10 MG Oral Tab Take 1 tablet (10 mg total) by mouth every morning. (Patient not taking: Reported on 12/17/2024)       Allergies:   Allergies   Allergen Reactions    Infed [Iron Dextran] HIVES    Ciprofloxacin      Other reaction(s): burning urination       Past Medical History:    Asthma, cough variant (HCC)    Calculus of kidney    Crohn disease (HCC)    Crohn's disease (HCC)    Diabetes (HCC)    Iron deficiency anemia     Past Surgical History:   Procedure Laterality Date    Colonoscopy N/A 6/12/2017    Procedure: COLONOSCOPY;  Surgeon: Naomi Moffett MD;  Location: [facility] ENDOSCOPY    Colostomy       Social History     Socioeconomic History    Marital status:    Tobacco Use    Smoking status: Never     Passive exposure: Never    Smokeless tobacco: Never   Substance and Sexual Activity    Alcohol use: Never    Drug use: Never   Other Topics Concern    Reaction to local anesthetic No    Pt has a pacemaker No    Pt has a defibrillator No     Social Drivers of Health     Food Insecurity: No Food Insecurity (12/8/2023)    Received from [facility]    Hunger Vital Sign     Worried About Running Out of Food in the Last Year: Never true     Ran Out of Food in the Last Year: Never true   Transportation Needs: No Transportation Needs (12/8/2023)    Received from [facility]    PRAPARE - Transportation     Lack of  Transportation (Medical): No     Lack of Transportation (Non-Medical): No   Housing Stability: Low Risk  (12/8/2023)    Received from [facility],  [place]    Housing Stability Vital Sign     Unable to Pay for Housing in the Last Year: No     Number of Places Lived in the Last Year: 1     Unstable Housing in the Last Year: No         Family History   Problem Relation Age of Onset    Diabetes Mother     Diabetes Father     Glaucoma Neg     Macular degeneration Neg     Cataracts Neg          PHYSICAL EXAM:    /78 (BP Location: Left arm, Patient Position: Sitting, Cuff Size: adult)   Pulse 82   Temp 98.1 °F (36.7 °C) (Oral)   Resp 16   Ht 1.702 m (5' 7\")   Wt 78.6 kg (173 lb 3.2 oz)   SpO2 100%   BMI 27.13 kg/m²   Wt Readings from Last 6 Encounters:   12/17/24 78.6 kg (173 lb 3.2 oz)   12/17/24 78.6 kg (173 lb 3.2 oz)   10/25/24 80.9 kg (178 lb 4.8 oz)   08/30/24 78.9 kg (174 lb)   07/08/24 76.5 kg (168 lb 11.2 oz)   06/20/24 76.7 kg (169 lb 1.6 oz)     Physical Exam  General: Patient is alert, not in acute distress.  HEENT: EOMs intact. PERRL.   Neck: No JVD. No palpable lymphadenopathy. Neck is supple.  Chest: Clear to auscultation.  Heart: Regular rate and rhythm.   Abdomen: Soft, non tender with good bowel sounds.  Ileostomy on the right upper quadrant  Extremities: No edema.  Neurological: Grossly intact.   Lymphatics: There is no palpable lymphadenopathy throughout in the cervical, supraclavicular, axillary, or inguinal regions.  Psych/Depression: nl        ASSESSMENT/PLAN:     Encounter Diagnoses   Name Primary?    Iron deficiency anemia secondary to inadequate dietary iron intake Yes    Iron deficiency anemia due to chronic blood loss     B12 deficiency     Iron malabsorption (HCC)     Medication monitoring encounter        Patient has anemia due to chronic iron deficiency secondary to poor absorption as a sequela of Chron's disease and its management.  He has had parenteral iron replacement in the  past and has responded and tolerated well.  In addition, he has B12 deficiency and has been off supplementation.  Also noted to have vitamin D deficiency.    Given the patient's severe microcytosis, suspect he also has thalassemia trait, which will test at a later time after iron is replaced.     --Patient has received iron loads with Venofer in the past and has tolerated well.  He did have reaction to iron dextran in the past.  He has been on monthly iron infusions with Venofer 200 mg.  Currently he is anemia has worsened.  His iron studies are pending, I do suspect that his iron deficiency has worsened as well.  Will proceed with repeat iron load with Venofer, will administer 400 mg since 3 doses, and then return to maintenance.  Patient to have repeat iron studies and CBC in 2 months to assess further need for iron load.    --B12 deficiency, he is now on intramuscular B12 once a month.  Continue lifelong.  B12 levels now normal.  We will need to repeat B12 levels as last patient is on B12 replacement therapy.    --Continue to take folic acid once daily.    --vitamin D deficiency:  Daily vitamin D 5000IU.      Follow up in 2 months      No orders of the defined types were placed in this encounter.    MDM high risk    Results From Past 48 Hours:  Recent Results (from the past 48 hours)   CBC W/DIFF [E]    Collection Time: 12/17/24  3:23 PM   Result Value Ref Range    WBC 5.6 4.0 - 11.0 x10(3) uL    RBC 5.06 4.30 - 5.70 x10(6)uL    HGB 7.5 (L) 13.0 - 17.5 g/dL    HCT 27.8 (L) 39.0 - 53.0 %    MCV 54.9 (L) 80.0 - 100.0 fL    MCH 14.8 (L) 26.0 - 34.0 pg    MCHC 27.0 (L) 31.0 - 37.0 g/dL    RDW-SD 40.1 35.1 - 46.3 fL    RDW 21.9 (H) 11.0 - 15.0 %    .0 150.0 - 450.0 10(3)uL    Immature Platelet Fraction 5.6 0.0 - 7.0 %    Neutrophil Absolute Prelim 3.04 1.50 - 7.70 x10 (3) uL    Neutrophil Absolute 3.04 1.50 - 7.70 x10(3) uL    Lymphocyte Absolute 1.99 1.00 - 4.00 x10(3) uL    Monocyte Absolute 0.44 0.10 - 1.00  x10(3) uL    Eosinophil Absolute 0.10 0.00 - 0.70 x10(3) uL    Basophil Absolute 0.04 0.00 - 0.20 x10(3) uL    Immature Granulocyte Absolute 0.02 0.00 - 1.00 x10(3) uL    Neutrophil % 54.0 %    Lymphocyte % 35.3 %    Monocyte % 7.8 %    Eosinophil % 1.8 %    Basophil % 0.7 %    Immature Granulocyte % 0.4 %   IRON AND TIBC [E]    Collection Time: 12/17/24  3:23 PM   Result Value Ref Range    Iron 11 (L) 65 - 175 ug/dL    Transferrin 324 215 - 365 mg/dL    Total Iron Binding Capacity 483 (H) 250 - 425 ug/dL    % Saturation 2 (L) 20 - 50 %   FERRITIN [E]    Collection Time: 12/17/24  3:23 PM   Result Value Ref Range    Ferritin 4 (L) 50 - 336 ng/mL     Imaging & Referrals:  None   Component      Latest Ref Rng 5/22/2024 6/20/2024 12/17/2024   WBC      4.0 - 11.0 x10(3) uL 8.8  7.0  5.6    RBC      4.30 - 5.70 x10(6)uL 5.33  4.75  5.06    Hemoglobin      13.0 - 17.5 g/dL 9.9 (L)  8.7 (L)  7.5 (L)    Hematocrit      39.0 - 53.0 % 34.3 (L)  29.9 (L)  27.8 (L)    MCV      80.0 - 100.0 fL 64.4 (L)  62.9 (L)  54.9 (L)    MCH      26.0 - 34.0 pg 18.6 (L)  18.3 (L)  14.8 (L)    MCHC      31.0 - 37.0 g/dL 28.9 (L)  29.1 (L)  27.0 (L)    RDW-SD      35.1 - 46.3 fL 58.4 (H)  47.3 (H)  40.1    RDW      11.0 - 15.0 % 26.3 (H)  21.6 (H)  21.9 (H)    Platelet Count      150.0 - 450.0 10(3)uL 395.0  355.0  304.0    Immature Platelet Fraction      0.0 - 7.0 % 2.8  3.2  5.6    Prelim Neutrophil Abs      1.50 - 7.70 x10 (3) uL 5.63  4.02  3.04    Neutrophils Absolute      1.50 - 7.70 x10(3) uL 5.63  4.02  3.04    Lymphocytes Absolute      1.00 - 4.00 x10(3) uL 2.05  1.93  1.99    Monocytes Absolute      0.10 - 1.00 x10(3) uL 0.72  0.65  0.44    Eosinophils Absolute      0.00 - 0.70 x10(3) uL 0.21  0.23  0.10    Basophils Absolute      0.00 - 0.20 x10(3) uL 0.08  0.07  0.04    Immature Granulocyte Absolute      0.00 - 1.00 x10(3) uL 0.10  0.06  0.02    Neutrophils %      % 64.1  57.8  54.0    Lymphocytes %      % 23.3  27.7  35.3     Monocytes %      % 8.2  9.3  7.8    Eosinophils %      % 2.4  3.3  1.8    Basophils %      % 0.9  1.0  0.7    Immature Granulocyte %      % 1.1  0.9  0.4    Iron, Serum      65 - 175 ug/dL 18 (L)      Transferrin      215 - 365 mg/dL 292      Iron Bind.Cap.(TIBC)      250 - 425 ug/dL 435 (H)      Iron Saturation      20 - 50 % 4 (L)      FERRITIN      48.0 - 420.0 ng/mL 10.7 (L)         Legend:  (L) Low  (H) High

## 2024-12-18 NOTE — DISCHARGE INSTRUCTIONS
Doctor in the morning to let them know that you have a puncture wound and were told to take antibiotics prophylactically.    Take Keflex 4 times a day for 7 days.  This is to prevent infection just in case the nail did injure or touch her bone.  Take a probiotic to help prevent diarrhea or inflammation due to the antibiotic use    Keep wound area clean and dry, continue to monitor area for increased redness, swelling, discharge drainage, fevers or chills.  If you develop any of these above please go to the ER for reevaluation

## 2024-12-18 NOTE — ED PROVIDER NOTES
Patient Seen in: [facility]      History   No chief complaint on file.    Stated Complaint: Foot Injury    Subjective:   HPI    39-year-old male here for evaluation of right foot pain, particularly bottom of 4th toe, after stepping on a nail today.  Patient reports the nail was attached to a piece of wood and he had shoes on accidentally stepping on this.  Reports when lifting his foot the nail was attached to the wood and not in his foot.  He did not have to remove anything from his foot or shoe.  The nail was quiana.  The patient states his tetanus is up-to-date in 2022.  Patient has a significant medical history including Crohn's diabetes and anemia he is on Stelara and has a ostomy.    Objective:     Past Medical History:    Asthma, cough variant (HCC)    Calculus of kidney    Crohn disease (HCC)    Crohn's disease (HCC)    Diabetes (HCC)    Iron deficiency anemia              Past Surgical History:   Procedure Laterality Date    Colonoscopy N/A 6/12/2017    Procedure: COLONOSCOPY;  Surgeon: Naomi Moffett MD;  Location: [facility] ENDOSCOPY    Colostomy                  Social History     Socioeconomic History    Marital status:    Tobacco Use    Smoking status: Never     Passive exposure: Never    Smokeless tobacco: Never   Substance and Sexual Activity    Alcohol use: Never    Drug use: Never   Other Topics Concern    Reaction to local anesthetic No    Pt has a pacemaker No    Pt has a defibrillator No     Social Drivers of Health     Food Insecurity: No Food Insecurity (12/8/2023)    Received from [facility]    Hunger Vital Sign     Worried About Running Out of Food in the Last Year: Never true     Ran Out of Food in the Last Year: Never true   Transportation Needs: No Transportation Needs (12/8/2023)    Received from [facility]    PRAPARE - Transportation     Lack of Transportation (Medical): No     Lack of Transportation (Non-Medical): No   Housing Stability: Low Risk   (12/8/2023)    [facility]    Housing Stability Vital Sign     Unable to Pay for Housing in the Last Year: No     Number of Places Lived in the Last Year: 1     Unstable Housing in the Last Year: No              Review of Systems    Positive for stated complaint: Foot Injury  Other systems are as noted in HPI.  Constitutional and vital signs reviewed.      All other systems reviewed and negative except as noted above.    Physical Exam     ED Triage Vitals [12/17/24 1737]   /72   Pulse 84   Resp 16   Temp 98.3 °F (36.8 °C)   Temp src Oral   SpO2 100 %   O2 Device None (Room air)       Current Vitals:   No data recorded      Physical Exam  Vitals and nursing note reviewed.   Constitutional:       General: He is not in acute distress.     Appearance: Normal appearance. He is not ill-appearing, toxic-appearing or diaphoretic.   Cardiovascular:      Rate and Rhythm: Normal rate.      Pulses: Normal pulses.   Pulmonary:      Effort: Pulmonary effort is normal. No respiratory distress.   Skin:     Findings: Wound (puncture abrasion noted to plantar 4th toe base. no bleeding, no swelling or erythema) present.   Neurological:      General: No focal deficit present.      Mental Status: He is alert and oriented to person, place, and time.           ED Course   Labs Reviewed - No data to display    ED Course as of 12/19/24 1455  ------------------------------------------------------------  Time: 12/17 1908  Value: XR FOOT, COMPLETE (MIN 3 VIEWS), RIGHT (CPT=73630)  Comment:   Impression  CONCLUSION: No acute fracture/dislocation or radiopaque foreign body identified in the region of the patient's injury              MDM     39 yr old male here for evaluation of pain and tenderness to plantar 4th toe right foot after stepping on nail accidentally. I was in a piece of wood, entered shoe sole, touched his toe and when he removed shoe from where he stepped nail was stuck to piece of wood. He did not  have to remove anything from foot or sole of shoe.  HX Crohns, on immunotherapy, ostomy.    ON exam, pt well appearing otherwise. No fever, tachycardia.  Noted abrasion from closing puncture wound plantar side of base of 4th toe right foot. No swelling, ecchymosis. No redness or swelling. Tender to touch.  Pedal pulse normal. Full ROM to toes and ankle joint.    Differential diagnoses reflecting the complexity of care include but are not limited to puncture wound, foreign body in foot.    Comorbidities that add complexity to management include: Crohns, Dm, asthma, anemia  History obtained by an independent source was from: patient  My independent interpretations of studies include: XR neg for FB or osteo  Shared decision making was done by: patient, myself  Discussions of management was done with: patinet  Patient is well appearing, non-toxic and in no acute distress.  Vital signs are stable.     Discussed prophylaxis for puncture wound despite small area of entry. PT does have allergy to cipro so cannot give levaquin or cipro. Discussed keflex x 7 days. Advised on good wound care, keeping a close eye on area for any changes, and close fu with PCP for wound check. Discussed probiotic for abx use, ER precautions.  All questions answered. Return and ER precautions given.    Counseled: Patient, regarding diagnosis, regarding treatment plan, regarding diagnostic results, regarding prescription, I have discussed with the patient the results of tests, differential diagnosis, and warning signs and symptoms that should prompt immediate return. The patient understands these instructions and agrees to the follow-up plan provided. There is no barriers to learning. Appropriate f/u given. Patient agrees to return for any concerns/ problems/complications.          Medical Decision Making      Disposition and Plan     Clinical Impression:  1. Puncture wound of lesser toe of right foot without foreign body without damage to nail,  initial encounter         Disposition:  Discharge  12/17/2024  7:17 pm    Follow-up:  Jenny Junior MD  [address]  [facility] [address] [phone]    Schedule an appointment as soon as possible for a visit in 3 days  For wound re-check          Medications Prescribed:  Discharge Medication List as of 12/17/2024  7:22 PM        START taking these medications    Details   cephALEXin 500 MG Oral Cap Take 1 capsule (500 mg total) by mouth 4 (four) times daily for 7 days., Normal, Disp-28 capsule, R-0                 Supplementary Documentation:

## 2024-12-23 ENCOUNTER — OFFICE VISIT (OUTPATIENT)
Age: 39
End: 2024-12-23
Attending: INTERNAL MEDICINE
Payer: MEDICAID

## 2024-12-23 VITALS
RESPIRATION RATE: 18 BRPM | BODY MASS INDEX: 28 KG/M2 | SYSTOLIC BLOOD PRESSURE: 114 MMHG | TEMPERATURE: 98 F | DIASTOLIC BLOOD PRESSURE: 76 MMHG | HEART RATE: 86 BPM | OXYGEN SATURATION: 100 % | WEIGHT: 176 LBS

## 2024-12-23 DIAGNOSIS — D50.8 IRON DEFICIENCY ANEMIA SECONDARY TO INADEQUATE DIETARY IRON INTAKE: Primary | ICD-10-CM

## 2024-12-23 DIAGNOSIS — D50.0 IRON DEFICIENCY ANEMIA DUE TO CHRONIC BLOOD LOSS: ICD-10-CM

## 2024-12-23 DIAGNOSIS — K90.89 OTHER SPECIFIED INTESTINAL MALABSORPTION (HCC): ICD-10-CM

## 2024-12-23 RX ORDER — CYANOCOBALAMIN 1000 UG/ML
1000 INJECTION, SOLUTION INTRAMUSCULAR; SUBCUTANEOUS ONCE
Start: 2024-12-30 | End: 2024-12-30

## 2024-12-23 NOTE — PROGRESS NOTES
Presents for Venofer 400mg. Reports fatigue and sob with activity.  Protocol explained to pt.  He was not aware that Venofer 400mg must run over 2.5 hours.  He regularly receives venofer 200mg and tolerates well.   PIV started with good blood return.  Venofer given as ordered and tolerated well.        Discharged stable.    Aware of future appointments.

## 2025-01-14 ENCOUNTER — APPOINTMENT (OUTPATIENT)
Age: 40
End: 2025-01-14
Attending: INTERNAL MEDICINE
Payer: MEDICAID

## 2025-01-17 ENCOUNTER — OFFICE VISIT (OUTPATIENT)
Age: 40
End: 2025-01-17
Attending: INTERNAL MEDICINE
Payer: MEDICAID

## 2025-01-17 VITALS
RESPIRATION RATE: 18 BRPM | SYSTOLIC BLOOD PRESSURE: 126 MMHG | DIASTOLIC BLOOD PRESSURE: 78 MMHG | HEART RATE: 93 BPM | WEIGHT: 176 LBS | BODY MASS INDEX: 28 KG/M2 | OXYGEN SATURATION: 98 % | TEMPERATURE: 98 F

## 2025-01-17 DIAGNOSIS — D50.0 IRON DEFICIENCY ANEMIA DUE TO CHRONIC BLOOD LOSS: ICD-10-CM

## 2025-01-17 DIAGNOSIS — K90.89 OTHER SPECIFIED INTESTINAL MALABSORPTION (HCC): ICD-10-CM

## 2025-01-17 DIAGNOSIS — D50.8 IRON DEFICIENCY ANEMIA SECONDARY TO INADEQUATE DIETARY IRON INTAKE: Primary | ICD-10-CM

## 2025-01-17 DIAGNOSIS — E53.8 B12 DEFICIENCY: ICD-10-CM

## 2025-01-17 LAB
BASOPHILS # BLD AUTO: 0.05 X10(3) UL (ref 0–0.2)
BASOPHILS NFR BLD AUTO: 0.8 %
DEPRECATED HBV CORE AB SER IA-ACNC: 16 NG/ML
DEPRECATED RDW RBC AUTO: 58.4 FL (ref 35.1–46.3)
EOSINOPHIL # BLD AUTO: 0.1 X10(3) UL (ref 0–0.7)
EOSINOPHIL NFR BLD AUTO: 1.5 %
ERYTHROCYTE [DISTWIDTH] IN BLOOD BY AUTOMATED COUNT: 27.1 % (ref 11–15)
HCT VFR BLD AUTO: 33.9 %
HGB BLD-MCNC: 9.9 G/DL
IMM GRANULOCYTES # BLD AUTO: 0.02 X10(3) UL (ref 0–1)
IMM GRANULOCYTES NFR BLD: 0.3 %
IRON SATN MFR SERPL: 5 %
IRON SERPL-MCNC: 22 UG/DL
LYMPHOCYTES # BLD AUTO: 2.19 X10(3) UL (ref 1–4)
LYMPHOCYTES NFR BLD AUTO: 33.6 %
MCH RBC QN AUTO: 18.2 PG (ref 26–34)
MCHC RBC AUTO-ENTMCNC: 29.2 G/DL (ref 31–37)
MCV RBC AUTO: 62.4 FL
MONOCYTES # BLD AUTO: 0.45 X10(3) UL (ref 0.1–1)
MONOCYTES NFR BLD AUTO: 6.9 %
NEUTROPHILS # BLD AUTO: 3.71 X10 (3) UL (ref 1.5–7.7)
NEUTROPHILS # BLD AUTO: 3.71 X10(3) UL (ref 1.5–7.7)
NEUTROPHILS NFR BLD AUTO: 56.9 %
PLATELET # BLD AUTO: 265 10(3)UL (ref 150–450)
PLATELET MORPHOLOGY: NORMAL
PLATELETS.RETICULATED NFR BLD AUTO: 4.3 % (ref 0–7)
RBC # BLD AUTO: 5.43 X10(6)UL
TIBC SERPL-MCNC: 419 UG/DL (ref 250–425)
TRANSFERRIN SERPL-MCNC: 281 MG/DL (ref 215–365)
WBC # BLD AUTO: 6.5 X10(3) UL (ref 4–11)

## 2025-01-17 RX ORDER — CYANOCOBALAMIN 1000 UG/ML
INJECTION, SOLUTION INTRAMUSCULAR; SUBCUTANEOUS
Status: COMPLETED
Start: 2025-01-17 | End: 2025-01-17

## 2025-01-17 RX ORDER — CYANOCOBALAMIN 1000 UG/ML
1000 INJECTION, SOLUTION INTRAMUSCULAR; SUBCUTANEOUS ONCE
Status: COMPLETED | OUTPATIENT
Start: 2025-01-17 | End: 2025-01-17

## 2025-01-17 RX ORDER — CYANOCOBALAMIN 1000 UG/ML
1000 INJECTION, SOLUTION INTRAMUSCULAR; SUBCUTANEOUS ONCE
Start: 2025-01-20 | End: 2025-01-20

## 2025-01-17 RX ADMIN — CYANOCOBALAMIN 1000 MCG: 1000 INJECTION, SOLUTION INTRAMUSCULAR; SUBCUTANEOUS at 16:34:00

## 2025-01-17 NOTE — PROGRESS NOTES
Pt here for Venofer 200 inf and b12 Inj. Pt denies any issues or concerns. Standing labs drawn and sent.      Ordering Provider: MD Harshal  Order Exp: 8/15/2025 q4wks      Pt tolerated infusion without difficulty or complaint. No adverse reaction s/s.   Reviewed next apt date/time: 2/11 at 1600      Education Record  Learner:  Patient  Disease / Diagnosis: GAL  Barriers / Limitations:  None  Method:  Discussion and Reinforcement  General Topics:  Plan of care reviewed  Outcome:  Shows understanding

## 2025-02-03 DIAGNOSIS — Z79.4 TYPE 2 DIABETES MELLITUS WITH HYPERGLYCEMIA, WITH LONG-TERM CURRENT USE OF INSULIN (HCC): ICD-10-CM

## 2025-02-03 DIAGNOSIS — E11.65 TYPE 2 DIABETES MELLITUS WITH HYPERGLYCEMIA, WITH LONG-TERM CURRENT USE OF INSULIN (HCC): ICD-10-CM

## 2025-02-03 RX ORDER — DULAGLUTIDE 0.75 MG/.5ML
0.75 INJECTION, SOLUTION SUBCUTANEOUS WEEKLY
COMMUNITY
End: 2025-02-03

## 2025-02-03 RX ORDER — PROCHLORPERAZINE 25 MG/1
SUPPOSITORY RECTAL
Qty: 9 EACH | Refills: 1 | Status: SHIPPED | OUTPATIENT
Start: 2025-02-03

## 2025-02-03 RX ORDER — DULAGLUTIDE 0.75 MG/.5ML
0.75 INJECTION, SOLUTION SUBCUTANEOUS WEEKLY
Qty: 6 ML | Refills: 1 | Status: SHIPPED | OUTPATIENT
Start: 2025-02-03

## 2025-02-03 RX ORDER — BLOOD SUGAR DIAGNOSTIC
STRIP MISCELLANEOUS
Qty: 300 EACH | Refills: 1 | Status: SHIPPED | OUTPATIENT
Start: 2025-02-03

## 2025-02-03 RX ORDER — ATORVASTATIN CALCIUM 10 MG/1
10 TABLET, FILM COATED ORAL NIGHTLY
Qty: 90 TABLET | Refills: 0 | Status: SHIPPED | OUTPATIENT
Start: 2025-02-03

## 2025-02-03 RX ORDER — INSULIN LISPRO 100 [IU]/ML
INJECTION, SOLUTION INTRAVENOUS; SUBCUTANEOUS
Qty: 72 ML | Refills: 0 | Status: SHIPPED | OUTPATIENT
Start: 2025-02-03

## 2025-02-03 NOTE — TELEPHONE ENCOUNTER
Patient needs refills on every medicine that he gets from [facility] atorvastatin 10 mg, Trulicity 0.75 mg, continuous glucose sensor Dexcom, insulin lispro 100 unit, metformin 1000 mg, test strips, [place] and needs 2 months supply please follow up

## 2025-02-03 NOTE — TELEPHONE ENCOUNTER
Endocrine refill protocol for basal insulins     Protocol Criteria: PASSED Reason: N/A    If all below requirements are met, send a 90-day supply with 1 refill per provider protocol.       Verify Appointment with Endocrinology completed in the last 6 months or scheduled in the next 3 months.  Verify A1C has been completed within the last 6 months and is below 8.5%     Last completed office visit:8/30/2024 Vic Moore APRN   Next scheduled Follow up:   Future Appointments   Date Time Provider Department Center                               5/1/2025 10:15 AM [facility] APRN [facility] [place] [facility]             Last A1c result: Last A1c value was 7.7% done 8/30/2024.

## 2025-02-11 ENCOUNTER — OFFICE VISIT (OUTPATIENT)
Age: 40
End: 2025-02-11
Attending: INTERNAL MEDICINE
Payer: MEDICAID

## 2025-02-11 VITALS
RESPIRATION RATE: 16 BRPM | TEMPERATURE: 98 F | OXYGEN SATURATION: 100 % | SYSTOLIC BLOOD PRESSURE: 127 MMHG | DIASTOLIC BLOOD PRESSURE: 80 MMHG | HEART RATE: 96 BPM | WEIGHT: 175 LBS | BODY MASS INDEX: 27 KG/M2

## 2025-02-11 DIAGNOSIS — E53.8 B12 DEFICIENCY: ICD-10-CM

## 2025-02-11 DIAGNOSIS — K90.89 OTHER SPECIFIED INTESTINAL MALABSORPTION (HCC): ICD-10-CM

## 2025-02-11 DIAGNOSIS — D50.0 IRON DEFICIENCY ANEMIA DUE TO CHRONIC BLOOD LOSS: ICD-10-CM

## 2025-02-11 DIAGNOSIS — D50.8 IRON DEFICIENCY ANEMIA SECONDARY TO INADEQUATE DIETARY IRON INTAKE: Primary | ICD-10-CM

## 2025-02-11 LAB
BASOPHILS # BLD AUTO: 0.04 X10(3) UL (ref 0–0.2)
BASOPHILS NFR BLD AUTO: 0.4 %
DEPRECATED HBV CORE AB SER IA-ACNC: 14 NG/ML
DEPRECATED RDW RBC AUTO: 53.9 FL (ref 35.1–46.3)
EOSINOPHIL # BLD AUTO: 0.05 X10(3) UL (ref 0–0.7)
EOSINOPHIL NFR BLD AUTO: 0.5 %
ERYTHROCYTE [DISTWIDTH] IN BLOOD BY AUTOMATED COUNT: 24.9 % (ref 11–15)
HCT VFR BLD AUTO: 35.9 %
HGB BLD-MCNC: 10.4 G/DL
IMM GRANULOCYTES # BLD AUTO: 0.02 X10(3) UL (ref 0–1)
IMM GRANULOCYTES NFR BLD: 0.2 %
IRON SATN MFR SERPL: 5 %
IRON SERPL-MCNC: 18 UG/DL
LYMPHOCYTES # BLD AUTO: 1.53 X10(3) UL (ref 1–4)
LYMPHOCYTES NFR BLD AUTO: 16.1 %
MCH RBC QN AUTO: 18.4 PG (ref 26–34)
MCHC RBC AUTO-ENTMCNC: 29 G/DL (ref 31–37)
MCV RBC AUTO: 63.4 FL
MONOCYTES # BLD AUTO: 0.45 X10(3) UL (ref 0.1–1)
MONOCYTES NFR BLD AUTO: 4.7 %
NEUTROPHILS # BLD AUTO: 7.44 X10 (3) UL (ref 1.5–7.7)
NEUTROPHILS # BLD AUTO: 7.44 X10(3) UL (ref 1.5–7.7)
NEUTROPHILS NFR BLD AUTO: 78.1 %
PLATELET # BLD AUTO: 329 10(3)UL (ref 150–450)
PLATELET MORPHOLOGY: NORMAL
PLATELETS.RETICULATED NFR BLD AUTO: 3.6 % (ref 0–7)
RBC # BLD AUTO: 5.66 X10(6)UL
TOTAL IRON BINDING CAPACITY: 364 UG/DL (ref 250–425)
TRANSFERRIN SERPL-MCNC: 277 MG/DL (ref 215–365)
WBC # BLD AUTO: 9.5 X10(3) UL (ref 4–11)

## 2025-02-11 RX ORDER — CYANOCOBALAMIN 1000 UG/ML
1000 INJECTION, SOLUTION INTRAMUSCULAR; SUBCUTANEOUS ONCE
Start: 2025-02-18 | End: 2025-02-18

## 2025-02-11 RX ORDER — CYANOCOBALAMIN 1000 UG/ML
INJECTION, SOLUTION INTRAMUSCULAR; SUBCUTANEOUS
Status: COMPLETED
Start: 2025-02-11 | End: 2025-02-11

## 2025-02-11 RX ORDER — CYANOCOBALAMIN 1000 UG/ML
1000 INJECTION, SOLUTION INTRAMUSCULAR; SUBCUTANEOUS ONCE
Status: COMPLETED | OUTPATIENT
Start: 2025-02-11 | End: 2025-02-11

## 2025-02-11 RX ADMIN — CYANOCOBALAMIN 1000 MCG: 1000 INJECTION, SOLUTION INTRAMUSCULAR; SUBCUTANEOUS at 16:37:00

## 2025-02-11 NOTE — PROGRESS NOTES
Presents for every 4 week venofer 200mg and b12 injection. Offers no complaints. PIV started with good blood return Venofer given slow IVP.    B12 given to right deltoid-tolerated well.      Discharged stable.    Aware of future appointments.

## 2025-02-25 ENCOUNTER — OFFICE VISIT (OUTPATIENT)
Age: 40
End: 2025-02-25
Attending: INTERNAL MEDICINE
Payer: MEDICAID

## 2025-02-25 VITALS
RESPIRATION RATE: 16 BRPM | WEIGHT: 174 LBS | HEIGHT: 67 IN | OXYGEN SATURATION: 98 % | BODY MASS INDEX: 27.31 KG/M2 | SYSTOLIC BLOOD PRESSURE: 121 MMHG | TEMPERATURE: 99 F | HEART RATE: 93 BPM | DIASTOLIC BLOOD PRESSURE: 77 MMHG

## 2025-02-25 DIAGNOSIS — E53.8 B12 DEFICIENCY: ICD-10-CM

## 2025-02-25 DIAGNOSIS — Z51.81 MEDICATION MONITORING ENCOUNTER: ICD-10-CM

## 2025-02-25 DIAGNOSIS — K90.89 OTHER SPECIFIED INTESTINAL MALABSORPTION (HCC): ICD-10-CM

## 2025-02-25 DIAGNOSIS — K90.9 IRON MALABSORPTION (HCC): ICD-10-CM

## 2025-02-25 DIAGNOSIS — D50.0 IRON DEFICIENCY ANEMIA DUE TO CHRONIC BLOOD LOSS: Primary | ICD-10-CM

## 2025-02-25 NOTE — PROGRESS NOTES
FLOR     Ariel Hunter is a 39 year old male who is here today for follow-up of of   Encounter Diagnoses   Name Primary?    Iron deficiency anemia due to chronic blood loss Yes    Other specified intestinal malabsorption (HCC)     Iron malabsorption (HCC)     B12 deficiency     Medication monitoring encounter      Iron therapy:  by intravenous injection.  The patient had reaction to iron dextran.  Therefore, he completed his course of intravenous iron therapy with 5 doses of 200 mg of Venofer each, this was on 05/24/21.  He is on maintenance venofer 200mg monthly.  Last dose today.    He is also receiving intramuscular B12 injections which he is tolerating well.    Not taking the folic acid.      He is leaving for [facility] on 3/5/25 for 6 weeks.      Symptoms:  Fatigue:  better and but still has it.  CH/SOB:  better and some CH if moves too fast  CP and/or palpitations:   none  related to anemia.  Headaches:   here and there  Dizziness or lightheadedness:   not often  Pica:   none      States was having leg cramps at night, states now seldom.    States Chron's was not well controlled and was having lot of small bowel obstructions.  Now on two agents for the colitis and has been on 8 months of these agents w/o any obstruction.  Had an appointment today and told doing very well.  Had colonoscopy 13 days ago and no inflammation on bx.       Review of Systems:   Review of Systems   Constitutional:  Positive for fatigue. Negative for appetite change, fever and unexpected weight change.   Respiratory:  Positive for shortness of breath. Negative for cough.    Cardiovascular:  Negative for chest pain and palpitations.   Gastrointestinal:  Negative for abdominal pain.   Musculoskeletal:  Negative for myalgias.   Neurological:  Negative for dizziness, headaches, light-headedness and numbness.   Hematological:  Does not bruise/bleed easily.   Psychiatric/Behavioral:  Negative for sleep disturbance.            Current  Outpatient Medications   Medication Sig Dispense Refill    atorvastatin 10 MG Oral Tab Take 1 tablet (10 mg total) by mouth nightly. 90 tablet 0    Insulin Lispro, 1 Unit Dial, 100 UNIT/ML Subcutaneous Solution Pen-injector INJECT 80 UNITS VIA INSULIN PUMP EVERY DAY. 72 mL 0    metFORMIN HCl 1000 MG Oral Tab Take 1 tablet (1,000 mg total) by mouth 2 (two) times daily with meals. 180 tablet 1    Dulaglutide (TRULICITY) 0.75 MG/0.5ML Subcutaneous Solution Auto-injector Inject 0.75 mg into the skin once a week. 6 mL 1    Continuous Glucose Sensor (DEXCOM G6 SENSOR) Does not apply Misc Change sensor every 10 days 9 each 1    ONETOUCH ULTRA In Vitro Strip Check blood sugar up to 3x daily 300 each 1    folic acid 1 MG Oral Tab Take 1 tablet (1 mg total) by mouth daily. 90 tablet 3    Dulaglutide (TRULICITY) 0.75 MG/0.5ML Subcutaneous Solution Pen-injector Inject 0.75 mg into the skin once a week. 6 mL 0    Insulin Disposable Pump (OMNIPOD 5 G6 PODS, GEN 5,) Does not apply Misc Inject 1 each into the skin every other day. Please dispense 9 boxes of 5 pods in each. Total 45 pods. 9 each 1    Continuous Glucose Transmitter (DEXCOM G6 TRANSMITTER) Does not apply Misc TEST BLOOD GLUCOSE AS DIRECTED WITH DEXCOM G6 SENSOR 1 each 1    hydrocortisone 2.5 % External Cream Apply every morning and evening with flares of face rash 28 g 3    ketoconazole 2 % External Shampoo Use 2-3 times weekly. Lather into scalp, beard, and face and leave on for 5 minutes before washing off. 120 mL 11    fluocinonide 0.05 % External Ointment APPLY TO AFFECTED AREA IN SCALP TWICE DAILY WITH FLARES 60 g 2    minoxidil 2.5 MG Oral Tab Take 1 tablet (2.5 mg total) by mouth daily. 180 tablet 0    OneTouch Delica Lancets 33G Does not apply Misc Use to check blood sugar 3x daily 300 each 1    Blood Glucose Monitoring Suppl (ONETOUCH ULTRA 2) w/Device Does not apply Kit Test 3x daily 1 kit 0    DEXCOM G6  Does not apply Device 1 Device daily. 1 each  0    Insulin Disposable Pump (OMNIPOD 5 G6 INTRO, GEN 5,) Does not apply Kit 1 each As Directed. 1 kit 0    predniSONE 20 MG Oral Tab Take 2 tablets (40 mg total) by mouth daily. 28 tablet 0    Insulin Pen Needle (TRUEPLUS 5-BEVEL PEN NEEDLES) 32G X 4 MM Does not apply Misc USE TO INJECT INSULIN UP TO THREE TIMES DAILY 100 each 0    montelukast 10 MG Oral Tab Take 1 tablet (10 mg total) by mouth every morning.      Vitamin D3, Cholecalciferol, (VITAMIN D3) 125 MCG (5000 UT) Oral Cap Take 1 capsule (5,000 Units total) by mouth daily.      STELARA 90 MG/ML Subcutaneous Solution Prefilled Syringe injection Inject into the skin every 3 (three) months. Takes every 2 months subcut       Allergies:   Allergies   Allergen Reactions    Infed [Iron Dextran] HIVES    Ciprofloxacin      Other reaction(s): burning urination       Past Medical History:    Asthma, cough variant (HCC)    Calculus of kidney    Crohn disease (HCC)    Crohn's disease (HCC)    Diabetes (HCC)    Iron deficiency anemia     Past Surgical History:   Procedure Laterality Date    Colonoscopy N/A 6/12/2017    Procedure: COLONOSCOPY;  Surgeon: Naomi Moffett MD;  Location: [facility] ENDOSCOPY    Colostomy       Social History     Socioeconomic History    Marital status:    Tobacco Use    Smoking status: Never     Passive exposure: Never    Smokeless tobacco: Never   Substance and Sexual Activity    Alcohol use: Never    Drug use: Never   Other Topics Concern    Reaction to local anesthetic No    Pt has a pacemaker No    Pt has a defibrillator No     Social Drivers of Health     Food Insecurity: No Food Insecurity (12/8/2023)    Received from [facility]    Hunger Vital Sign     Worried About Running Out of Food in the Last Year: Never true     Ran Out of Food in the Last Year: Never true   Transportation Needs: No Transportation Needs (12/8/2023)    Received from [employer]  [employer]    PRAPARE - Transportation     Lack of Transportation (Medical):  No     Lack of Transportation (Non-Medical): No   Housing Stability: Low Risk  (12/8/2023)    Received from [facility]  [employer]    Housing Stability Vital Sign     Unable to Pay for Housing in the Last Year: No     Number of Places Lived in the Last Year: 1     Unstable Housing in the Last Year: No         Family History   Problem Relation Age of Onset    Diabetes Mother     Diabetes Father     Glaucoma Neg     Macular degeneration Neg     Cataracts Neg          PHYSICAL EXAM:    /77 (BP Location: Left arm, Patient Position: Sitting, Cuff Size: large)   Pulse 93   Temp 98.6 °F (37 °C) (Tympanic)   Resp 16   Ht 1.702 m (5' 7\")   Wt 78.9 kg (174 lb)   SpO2 98%   BMI 27.25 kg/m²   Wt Readings from Last 6 Encounters:   02/25/25 78.9 kg (174 lb)   02/11/25 79.4 kg (175 lb)   01/17/25 79.8 kg (176 lb)   12/23/24 79.8 kg (176 lb)   12/17/24 78.6 kg (173 lb 3.2 oz)   12/17/24 78.6 kg (173 lb 3.2 oz)     Physical Exam  General: Patient is alert, not in acute distress.  HEENT: EOMs intact. PERRL.   Neck: No JVD. No palpable lymphadenopathy. Neck is supple.  Chest: Clear to auscultation.  Heart: Regular rate and rhythm.   Abdomen: Soft, non tender with good bowel sounds.  Ileostomy on the right upper quadrant  Extremities: No edema.  Neurological: Grossly intact.   Lymphatics: There is no palpable lymphadenopathy throughout in the cervical, supraclavicular, axillary, or inguinal regions.  Psych/Depression: nl        ASSESSMENT/PLAN:     Encounter Diagnoses   Name Primary?    Iron deficiency anemia due to chronic blood loss Yes    Other specified intestinal malabsorption (HCC)     Iron malabsorption (HCC)     B12 deficiency     Medication monitoring encounter        Patient has anemia due to chronic iron deficiency secondary to poor absorption as a sequela of Chron's disease and its management.  He has had parenteral iron replacement in the past and has responded and tolerated well.  In addition, he has B12  deficiency and has been off supplementation.  Also noted to have vitamin D deficiency.    Given the patient's severe microcytosis, suspect he also has thalassemia trait, which will test at a later time after iron is replaced.     --Patient has received iron loads with Venofer in the past and has tolerated well.  He did have reaction to iron dextran in the past.  He has been on monthly iron infusions with Venofer 200 mg.  Currently he is anemia has worsened.  His iron studies are pending, I do suspect that his iron deficiency has worsened as well.  Will proceed with repeat iron load with Venofer, will administer 400 mg since 3 doses, and then return to maintenance.  Patient to have repeat iron studies and CBC in 2 months to assess further need for iron load.    --B12 deficiency, he is now on intramuscular B12 once a month.  Continue lifelong.  B12 levels now normal.  We will need to repeat B12 levels as last patient is on B12 replacement therapy.    --Continue to take folic acid once daily.    --vitamin D deficiency:  Daily vitamin D 5000IU.      Follow up in 3 months      No orders of the defined types were placed in this encounter.    MDM high risk    Results From Past 48 Hours:  No results found for this or any previous visit (from the past 48 hours).    Imaging & Referrals:  None     Component      Latest Ref Rng 12/17/2024 1/17/2025 2/11/2025   WBC      4.0 - 11.0 x10(3) uL 5.6  6.5  9.5    RBC      4.30 - 5.70 x10(6)uL 5.06  5.43  5.66    Hemoglobin      13.0 - 17.5 g/dL 7.5 (L)  9.9 (L)  10.4 (L)    Hematocrit      39.0 - 53.0 % 27.8 (L)  33.9 (L)  35.9 (L)    MCV      80.0 - 100.0 fL 54.9 (L)  62.4 (L)  63.4 (L)    MCH      26.0 - 34.0 pg 14.8 (L)  18.2 (L)  18.4 (L)    MCHC      31.0 - 37.0 g/dL 27.0 (L)  29.2 (L)  29.0 (L)    RDW-SD      35.1 - 46.3 fL 40.1  58.4 (H)  53.9 (H)    RDW      11.0 - 15.0 % 21.9 (H)  27.1 (H)  24.9 (H)    Platelet Count      150.0 - 450.0 10(3)uL 304.0  265.0  329.0    Immature  Platelet Fraction      0.0 - 7.0 % 5.6  4.3  3.6    Prelim Neutrophil Abs      1.50 - 7.70 x10 (3) uL 3.04  3.71  7.44    Neutrophils Absolute      1.50 - 7.70 x10(3) uL 3.04  3.71  7.44    Lymphocytes Absolute      1.00 - 4.00 x10(3) uL 1.99  2.19  1.53    Monocytes Absolute      0.10 - 1.00 x10(3) uL 0.44  0.45  0.45    Eosinophils Absolute      0.00 - 0.70 x10(3) uL 0.10  0.10  0.05    Basophils Absolute      0.00 - 0.20 x10(3) uL 0.04  0.05  0.04    Immature Granulocyte Absolute      0.00 - 1.00 x10(3) uL 0.02  0.02  0.02    Neutrophils %      % 54.0  56.9  78.1    Lymphocytes %      % 35.3  33.6  16.1    Monocytes %      % 7.8  6.9  4.7    Eosinophils %      % 1.8  1.5  0.5    Basophils %      % 0.7  0.8  0.4    Immature Granulocyte %      % 0.4  0.3  0.2    Iron, Serum      65 - 175 ug/dL 11 (L)  22 (L)  18 (L)    Transferrin      215 - 365 mg/dL 324  281  277    Iron Bind.Cap.(TIBC)      250 - 425 ug/dL 483 (H)  419  364    Iron Saturation      20 - 50 % 2 (L)  5 (L)  5 (L)    FERRITIN      50 - 336 ng/mL 4 (L)  16 (L)  14 (L)

## 2025-03-03 ENCOUNTER — OFFICE VISIT (OUTPATIENT)
Age: 40
End: 2025-03-03
Attending: INTERNAL MEDICINE
Payer: MEDICAID

## 2025-03-03 VITALS
BODY MASS INDEX: 27 KG/M2 | SYSTOLIC BLOOD PRESSURE: 125 MMHG | HEART RATE: 84 BPM | OXYGEN SATURATION: 100 % | RESPIRATION RATE: 16 BRPM | TEMPERATURE: 99 F | WEIGHT: 170.63 LBS | DIASTOLIC BLOOD PRESSURE: 78 MMHG

## 2025-03-03 DIAGNOSIS — D50.0 IRON DEFICIENCY ANEMIA DUE TO CHRONIC BLOOD LOSS: ICD-10-CM

## 2025-03-03 DIAGNOSIS — K90.89 OTHER SPECIFIED INTESTINAL MALABSORPTION (HCC): ICD-10-CM

## 2025-03-03 DIAGNOSIS — D50.8 IRON DEFICIENCY ANEMIA SECONDARY TO INADEQUATE DIETARY IRON INTAKE: Primary | ICD-10-CM

## 2025-03-03 RX ORDER — CYANOCOBALAMIN 1000 UG/ML
1000 INJECTION, SOLUTION INTRAMUSCULAR; SUBCUTANEOUS ONCE
Start: 2025-03-10 | End: 2025-03-10

## 2025-03-03 NOTE — PROGRESS NOTES
Pt completed remainder of infusion w/o incident or complaint.  Discharged home ambulating independently.

## 2025-03-03 NOTE — PROGRESS NOTES
Pt here for Venofer 400mg (dose 1 of 2) . Aaron requests 2nd dose be scheduled on May 2nd. Pt denies any issues or concerns. Patients trip [place] has been postponed. He is unsure when it will be rescheduled too. Discussed with Breann whom approved continuing with 400mg dose today as planned. Pt to call the office if trip is delayed greater than one week.      Ordering Provider: Dr Caputo  Order Exp: One 400mg dose remains then patient to return to 300mg monthly as maintenance.      Report given to SAMIR Lozano.

## 2025-03-11 ENCOUNTER — OFFICE VISIT (OUTPATIENT)
Age: 40
End: 2025-03-11
Attending: INTERNAL MEDICINE
Payer: MEDICAID

## 2025-03-11 ENCOUNTER — APPOINTMENT (OUTPATIENT)
Age: 40
End: 2025-03-11
Attending: INTERNAL MEDICINE
Payer: MEDICAID

## 2025-03-11 VITALS
BODY MASS INDEX: 26 KG/M2 | WEIGHT: 167.38 LBS | HEART RATE: 75 BPM | RESPIRATION RATE: 16 BRPM | SYSTOLIC BLOOD PRESSURE: 121 MMHG | DIASTOLIC BLOOD PRESSURE: 76 MMHG | TEMPERATURE: 98 F

## 2025-03-11 DIAGNOSIS — K90.89 OTHER SPECIFIED INTESTINAL MALABSORPTION (HCC): ICD-10-CM

## 2025-03-11 DIAGNOSIS — D50.0 IRON DEFICIENCY ANEMIA DUE TO CHRONIC BLOOD LOSS: ICD-10-CM

## 2025-03-11 DIAGNOSIS — D50.8 IRON DEFICIENCY ANEMIA SECONDARY TO INADEQUATE DIETARY IRON INTAKE: Primary | ICD-10-CM

## 2025-03-11 RX ORDER — CYANOCOBALAMIN 1000 UG/ML
1000 INJECTION, SOLUTION INTRAMUSCULAR; SUBCUTANEOUS ONCE
Status: COMPLETED | OUTPATIENT
Start: 2025-03-11 | End: 2025-03-11

## 2025-03-11 RX ORDER — CYANOCOBALAMIN 1000 UG/ML
1000 INJECTION, SOLUTION INTRAMUSCULAR; SUBCUTANEOUS ONCE
Start: 2025-03-25 | End: 2025-03-25

## 2025-03-11 RX ORDER — CYANOCOBALAMIN 1000 UG/ML
INJECTION, SOLUTION INTRAMUSCULAR; SUBCUTANEOUS
Status: COMPLETED
Start: 2025-03-11 | End: 2025-03-11

## 2025-03-11 RX ADMIN — CYANOCOBALAMIN 1000 MCG: 1000 INJECTION, SOLUTION INTRAMUSCULAR; SUBCUTANEOUS at 08:57:00

## 2025-03-11 NOTE — PROGRESS NOTES
Pt here for Venofer 400mg and Vitamin B12 . Pt denies any issues or concerns.   He may be out of town, but unsure of when.  He will adjust future appointments as needed.       Ordering Provider: Harshal       Pt tolerated infusion without difficulty or complaint. Reviewed next apt date/time: 4/8 at 1600      Education Record  Learner:  Patient  Disease / Diagnosis: Anemia  Barriers / Limitations:  None  Method:  Brief focused and Discussion  General Topics:  Plan of care reviewed  Outcome:  Shows understanding

## 2025-03-18 RX ORDER — HYDROCORTISONE 25 MG/G
CREAM TOPICAL
Qty: 30 G | Refills: 1 | Status: SHIPPED | OUTPATIENT
Start: 2025-03-18

## 2025-03-18 NOTE — TELEPHONE ENCOUNTER
Refill Request for medication(s):     Last Office Visit: 07/08/2024    Last Refill: 07/08/2024    Pharmacy, Dosage verified: yes    Condition Update (if applicable):     Rx pended and sent to provider for approval, please advise. Thank You!

## 2025-04-08 ENCOUNTER — APPOINTMENT (OUTPATIENT)
Age: 40
End: 2025-04-08
Attending: INTERNAL MEDICINE
Payer: MEDICAID

## 2025-04-08 ENCOUNTER — HOSPITAL ENCOUNTER (OUTPATIENT)
Age: 40
Discharge: OTHER INSTITUTION | End: 2025-04-08
Payer: MEDICAID

## 2025-04-08 VITALS
OXYGEN SATURATION: 100 % | TEMPERATURE: 103 F | DIASTOLIC BLOOD PRESSURE: 80 MMHG | RESPIRATION RATE: 24 BRPM | WEIGHT: 165 LBS | HEART RATE: 126 BPM | SYSTOLIC BLOOD PRESSURE: 112 MMHG | HEIGHT: 67 IN | BODY MASS INDEX: 25.9 KG/M2

## 2025-04-08 DIAGNOSIS — R68.83 CHILLS: Primary | ICD-10-CM

## 2025-04-08 DIAGNOSIS — T14.8XXA WOUND INFECTION: ICD-10-CM

## 2025-04-08 DIAGNOSIS — L08.9 WOUND INFECTION: ICD-10-CM

## 2025-04-08 LAB
POCT INFLUENZA A: NEGATIVE
POCT INFLUENZA B: NEGATIVE
SARS-COV-2 RNA RESP QL NAA+PROBE: NOT DETECTED

## 2025-04-08 PROCEDURE — 99215 OFFICE O/P EST HI 40 MIN: CPT | Performed by: NURSE PRACTITIONER

## 2025-04-08 PROCEDURE — U0002 COVID-19 LAB TEST NON-CDC: HCPCS | Performed by: NURSE PRACTITIONER

## 2025-04-08 PROCEDURE — A9150 MISC/EXPER NON-PRESCRIPT DRU: HCPCS | Performed by: NURSE PRACTITIONER

## 2025-04-08 PROCEDURE — 87502 INFLUENZA DNA AMP PROBE: CPT | Performed by: NURSE PRACTITIONER

## 2025-04-08 RX ORDER — ACETAMINOPHEN 500 MG
1000 TABLET ORAL ONCE
Status: COMPLETED | OUTPATIENT
Start: 2025-04-08 | End: 2025-04-08

## 2025-04-09 NOTE — ED INITIAL ASSESSMENT (HPI)
Patient is a 39-year-old diabetic gentleman with a history of a burn to his foot that he sustained now with fever as high as 103, and pulse of 126 in the immediate care.  Patient with negative COVID and flu at this time.  The patient will be sent directly to the hospital for further treatment as he may need additional workup including lactic acid and blood cultures at this time.

## 2025-04-09 NOTE — ED PROVIDER NOTES
Patient Seen in: [facility]      History     Chief Complaint   Patient presents with    Fever    Body ache and/or chills    Leg Pain     Stated Complaint: fever; chills, feels cold; sore on l leg; leg cramps, Diabetic    Subjective:   39-year-old male history of diabetes, Crohn's, who yesterday woke up with fever, chills, body aches.  States he has an open sore to his left foot, that happened 5 days ago when he accidentally got hot oil on it.  States now there is pus.  He is on an immunosuppressant, he is a diabetic.  No known sick exposure.  No cough or URI symptoms.  States he is having bilateral lower leg cramping.    Patient gave verbal consent to use haiku montse in order to take an image and attach it with her chart.    See attached image.                Objective:     Past Medical History:    Asthma, cough variant (HCC)    Calculus of kidney    Crohn disease (HCC)    Crohn's disease (HCC)    Diabetes (HCC)    Iron deficiency anemia              Past Surgical History:   Procedure Laterality Date    Colonoscopy N/A 6/12/2017    Procedure: COLONOSCOPY;  Surgeon: Naomi Moffett MD;  Location: [facility] ENDOSCOPY    Colostomy                  Social History     Socioeconomic History    Marital status:    Tobacco Use    Smoking status: Never     Passive exposure: Never    Smokeless tobacco: Never   Substance and Sexual Activity    Alcohol use: Never    Drug use: Never   Other Topics Concern    Reaction to local anesthetic No    Pt has a pacemaker No    Pt has a defibrillator No     Social Drivers of Health     Food Insecurity: No Food Insecurity (12/8/2023)    Received from [facility]    Hunger Vital Sign     Worried About Running Out of Food in the Last Year: Never true     Ran Out of Food in the Last Year: Never true   Transportation Needs: No Transportation Needs (12/8/2023)    Received from [facility]    PRAPARE - Transportation     Lack of Transportation (Medical): No     Lack  of Transportation (Non-Medical): No   Housing Stability: Low Risk  (12/8/2023)    [facility]    Housing Stability Vital Sign     Unable to Pay for Housing in the Last Year: No     Number of Places Lived in the Last Year: 1     Unstable Housing in the Last Year: No              Review of Systems   Constitutional:  Positive for chills and fever.   Musculoskeletal:  Positive for myalgias.   Skin:  Positive for wound.   All other systems reviewed and are negative.      Positive for stated complaint: fever; chills, feels cold; sore on l leg; leg cramps, Diabetic  Other systems are as noted in HPI.  Constitutional and vital signs reviewed.      All other systems reviewed and negative except as noted above.    Physical Exam     ED Triage Vitals [04/08/25 1907]   /80   Pulse (!) 126   Resp 24   Temp 98.3 °F (36.8 °C)   Temp src Oral   SpO2 100 %   O2 Device None (Room air)       Current Vitals:   Vital Signs  BP: 112/80  Pulse: (!) 126 (RN aware)  Resp: 24  Temp: (!) 103.1 °F (39.5 °C)  Temp src: Oral    Oxygen Therapy  SpO2: 100 %  O2 Device: None (Room air)        Physical Exam  Vitals and nursing note reviewed.   Constitutional:       General: He is not in acute distress.     Appearance: Normal appearance. He is ill-appearing. He is not toxic-appearing or diaphoretic.   Cardiovascular:      Rate and Rhythm: Normal rate and regular rhythm.      Pulses: Normal pulses.      Heart sounds: Normal heart sounds.   Pulmonary:      Effort: Pulmonary effort is normal. No respiratory distress.      Breath sounds: Normal breath sounds. No stridor. No wheezing, rhonchi or rales.   Skin:     General: Skin is warm and dry.      Coloration: Skin is not jaundiced or pale.      Comments: Please see attached image of open wound, with surrounding erythema.   Neurological:      General: No focal deficit present.      Mental Status: He is alert and oriented to person, place, and time.   Psychiatric:         Mood  and Affect: Mood normal.         Behavior: Behavior normal.             ED Course     Labs Reviewed   POCT FLU TEST - Normal    Narrative:     This assay is a rapid molecular in vitro test utilizing nucleic acid amplification of influenza A and B viral RNA.   RAPID SARS-COV-2 BY PCR - Normal                   MDM              Medical Decision Making  Nontoxic-appearing 39-year-old male in no respiratory distress with an open wound.  Fever, chills.  Neither legs are swollen, no calf tenderness.  Negative flu and COVID.    Needs further evaluation in the ER, for septic workup for possible cellulitis.  Discussed this case with my supervising physician for today, Dr. Ta.  Patient states that his mother who is at bedside will drive him directly to [facility] emergency department in [place].  He left in stable condition.        Amount and/or Complexity of Data Reviewed  Labs: ordered. Decision-making details documented in ED Course.  ECG/medicine tests: ordered. Decision-making details documented in ED Course.        Disposition and Plan     Clinical Impression:  1. Chills    2. Wound infection         Disposition:  Ic to ed  4/8/2025  7:24 pm    Follow-up:  No follow-up provider specified.        Medications Prescribed:  Current Discharge Medication List              Supplementary Documentation:

## 2025-04-15 ENCOUNTER — APPOINTMENT (OUTPATIENT)
Age: 40
End: 2025-04-15
Attending: INTERNAL MEDICINE
Payer: MEDICAID

## 2025-04-17 RX ORDER — ATORVASTATIN CALCIUM 10 MG/1
10 TABLET, FILM COATED ORAL NIGHTLY
Qty: 90 TABLET | Refills: 0 | Status: SHIPPED | OUTPATIENT
Start: 2025-04-17

## 2025-04-17 RX ORDER — MINOXIDIL 2.5 MG/1
2.5 TABLET ORAL DAILY
Qty: 180 TABLET | Refills: 0 | OUTPATIENT
Start: 2025-04-17

## 2025-04-17 RX ORDER — FLUOCINONIDE 0.5 MG/G
OINTMENT TOPICAL
Qty: 60 G | Refills: 2 | OUTPATIENT
Start: 2025-04-17

## 2025-04-17 RX ORDER — INSULIN LISPRO 100 [IU]/ML
INJECTION, SOLUTION INTRAVENOUS; SUBCUTANEOUS
Qty: 75 ML | Refills: 0 | Status: SHIPPED | OUTPATIENT
Start: 2025-04-17

## 2025-04-17 NOTE — TELEPHONE ENCOUNTER
Refill request has failed the Ambulatory Medication Refill Standing Order and is routed to the primary physician to review the following:    Requested Prescriptions     Refused Prescriptions Disp Refills    MINOXIDIL 2.5 MG Oral Tab [Pharmacy Med Name: MINOXIDIL 2.5MG TABLETS] 180 tablet 0     Sig: TAKE 1 TABLET BY MOUTH DAILY     Refused By: RONEY VALERIO     Reason for Refusal: Appt required, please call patient

## 2025-04-17 NOTE — TELEPHONE ENCOUNTER
Endocrine refill protocol for lipid lowering medications    Protocol Criteria:  PASSED Reason: N/A    If all below requirements are met, send a 90-day supply with 1 refill per provider protocol.    Verify appointment with Endocrinology completed in the last 6 months or scheduled in the next 3 months.  Lipid panel must have been completed in the last 12 months   ALT result below 80  LDL result below 130    Last completed office visit:8/30/2024 Vic Moore APRN   Next scheduled Follow up:   Future Appointments   Date Time Provider Department Center   5/1/2025 10:15 AM Vic Moore APRN [id number]  [facility]   5/2/2025  2:00 PM EL[facility] INFRN 5 [facility] SW Inf [place]   5/5/2025 11:20 AM Tahir Gomez MD [id number]   5/30/2025  2:00 PM EL[facility] CC INFRN 5 [facility] SW Inf [place] [facility]   6/10/2025  4:30 PM Ketan Caputo MD ELMSW HemOnc [place] [facility]   6/27/2025  2:00 PM EL[facility] CC INFRN 5 EL[facility] SW Inf [place] [facility]      Component  Ref Range & Units 8/1/24  2:14 PM   CHOLESTEROL  <200 MG/   HDL  >40 MG/DL 29 Low    TRIGLYCERIDE  <150 MG/ High    LDL, CALCULATED  <130 MG/DL 55       Last ALT and AST result: 12/19/2024    ALT  7 - 50 U/L 13   AST  10 - 40 U/L 15       90 Day + 1 refill pending

## 2025-04-17 NOTE — TELEPHONE ENCOUNTER
Zoe sent to pt today, she needs to be seen for F/U, pt also requested minoxidil, over 6 months - needs to be seen for alopecia per protocol

## 2025-04-17 NOTE — TELEPHONE ENCOUNTER
Endocrine refill protocol for basal insulins     Protocol Criteria: PASSED Reason: N/A    If all below requirements are met, send a 90-day supply with 1 refill per provider protocol.       Verify Appointment with Endocrinology completed in the last 6 months or scheduled in the next 3 months.  Verify A1C has been completed within the last 6 months and is below 8.5%     Last completed office visit:8/30/2024 iVc Moore APRN   Next scheduled Follow up:   Future Appointments   Date Time Provider Department Center   5/1/2025 10:15 AM Vic Moore APRN [facility] [place] [facility]                                         Last A1c result: Last A1c value was 7.7% done 8/30/2024.

## 2025-05-01 ENCOUNTER — OFFICE VISIT (OUTPATIENT)
Dept: ENDOCRINOLOGY CLINIC | Facility: CLINIC | Age: 40
End: 2025-05-01

## 2025-05-01 VITALS
WEIGHT: 167 LBS | DIASTOLIC BLOOD PRESSURE: 64 MMHG | HEART RATE: 79 BPM | HEIGHT: 67 IN | SYSTOLIC BLOOD PRESSURE: 103 MMHG | BODY MASS INDEX: 26.21 KG/M2

## 2025-05-01 DIAGNOSIS — E11.65 TYPE 2 DIABETES MELLITUS WITH HYPERGLYCEMIA, WITH LONG-TERM CURRENT USE OF INSULIN (HCC): Primary | ICD-10-CM

## 2025-05-01 DIAGNOSIS — Z79.4 TYPE 2 DIABETES MELLITUS WITH HYPERGLYCEMIA, WITH LONG-TERM CURRENT USE OF INSULIN (HCC): Primary | ICD-10-CM

## 2025-05-01 LAB
AMB EXT GMI: 9.1 %
CARTRIDGE EXPIRATION DATE: ABNORMAL DATE
GLUCOSE BLOOD: 161
HEMOGLOBIN A1C: 9.1 % (ref 4.3–5.6)
TEST STRIP LOT #: NORMAL NUMERIC

## 2025-05-01 PROCEDURE — 99214 OFFICE O/P EST MOD 30 MIN: CPT | Performed by: NURSE PRACTITIONER

## 2025-05-01 PROCEDURE — 95251 CONT GLUC MNTR ANALYSIS I&R: CPT | Performed by: NURSE PRACTITIONER

## 2025-05-01 RX ORDER — INSULIN LISPRO 100 [IU]/ML
INJECTION, SOLUTION INTRAVENOUS; SUBCUTANEOUS
Qty: 75 ML | Refills: 0 | Status: SHIPPED | OUTPATIENT
Start: 2025-05-01 | End: 2025-05-01

## 2025-05-01 RX ORDER — PROCHLORPERAZINE 25 MG/1
SUPPOSITORY RECTAL
Qty: 1 EACH | Refills: 1 | Status: SHIPPED | OUTPATIENT
Start: 2025-05-01

## 2025-05-01 RX ORDER — INSULIN LISPRO 100 [IU]/ML
INJECTION, SOLUTION INTRAVENOUS; SUBCUTANEOUS
Qty: 75 ML | Refills: 0 | Status: SHIPPED | OUTPATIENT
Start: 2025-05-01

## 2025-05-01 RX ORDER — PROCHLORPERAZINE 25 MG/1
SUPPOSITORY RECTAL
Qty: 9 EACH | Refills: 1 | Status: SHIPPED | OUTPATIENT
Start: 2025-05-01

## 2025-05-01 RX ORDER — INSULIN PMP CART,AUT,G6/7,CNTR
1 EACH SUBCUTANEOUS
Qty: 9 EACH | Refills: 1 | Status: SHIPPED | OUTPATIENT
Start: 2025-05-01

## 2025-05-01 RX ORDER — DULAGLUTIDE 0.75 MG/.5ML
0.75 INJECTION, SOLUTION SUBCUTANEOUS WEEKLY
Qty: 6 ML | Refills: 1 | Status: SHIPPED | OUTPATIENT
Start: 2025-05-01
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
## 2025-05-01 NOTE — PROGRESS NOTES
-----------------------------  Dexcom Clarity  -----------------------------  Ariel Hunter    YOB: 1985    Generated at: u, May 1, 2025 9:44 AM CDT    Reporting period: Mon Mar 31, 2025 - Tue Apr 29, 2025  -----------------------------  Glucose Details    Average glucose: 242 mg/dL    GMI: 9.1%    Standard deviation: 87 mg/dL    Coefficient of Variation: 35.9%  -----------------------------  Time in Range    Very High: 43%    High: 29%    In Range: 28%    Low: 0%    Very Low: 0%    Target Range   mg/dL    -----------------------------  Sensor usage    Days with data: 19/30    Time active: 86%    Avg. calibrations per day: 0.0

## 2025-05-01 NOTE — PATIENT INSTRUCTIONS
A1C: 9.1% today --> previously was 7.7% on 8/30/2024  Blood glucose: 161 in clinic today  Endocrinology fax# [phone] --> for eye doctor     Medications:   - continue with Metformin 1,000mg twice daily  - continue with Trulicity 0.75mg once weekly   - continue with Metformin 1,000mg twice daily     Omnipod 5   Basal rate:  12A 1.0    ICR   12A 3 --> 4  10A 6  4P 3 --> 4    Target Glucose: 110  Correction Factor: 38 --> 46  Active Insulin Time: 2hrs      Let's get back on track with entering correct carbs with each meal   - 1 cup of rice = 65gm   - 1 cup of pasta = 65gm     Please give me an update on your glucose readings in 3 weeks     Weight:  Wt Readings from Last 6 Encounters:   05/01/25 167 lb (75.8 kg)   04/08/25 165 lb (74.8 kg)   03/11/25 167 lb 6.4 oz (75.9 kg)   03/03/25 170 lb 9.6 oz (77.4 kg)   02/25/25 174 lb (78.9 kg)   02/11/25 175 lb (79.4 kg)     A1C goal:  <7.0%    Blood sugar testing:  Continue using Dexcom G6 continuous glucometer     Blood sugar targets:  Before breakfast:  (preferably < 110)  Before meals: <150   2 hours after meals: <180 (preferably <150)     Call for persistent blood sugars < 75 or > 200

## 2025-05-02 ENCOUNTER — APPOINTMENT (OUTPATIENT)
Age: 40
End: 2025-05-02
Attending: INTERNAL MEDICINE
Payer: MEDICAID

## 2025-05-05 ENCOUNTER — TELEPHONE (OUTPATIENT)
Dept: ENDOCRINOLOGY CLINIC | Facility: CLINIC | Age: 40
End: 2025-05-05

## 2025-05-05 NOTE — TELEPHONE ENCOUNTER
Spoke to patient to relay message below - MC sent with pump adjustments - patient stated understanding and will make changes to settings  Patient scheduled with CDE 5/21/25

## 2025-05-05 NOTE — TELEPHONE ENCOUNTER
Update noted. Pump download reviewed. It seems that he had a bad pod site and this initially caused hyperglycemia, once pod was changed (yesterday), I believe the correction factor overcorrected and this lead to low glucose reading episode.     Please instruct patient to make the following setting changes:       Omnipod 5      Basal rate:  12A 1.0     ICR   12A 4  10A 6  4P 4      Target Glucose: 110  Correction Factor: 46 --> 52  Active Insulin Time: 2--> 2.5hrs      These setting changes will give him less insulin per sliding scale and a little less sooner.       Please also schedule apt with endo cde in 1-2 weeks to review pump download.       Thank you!

## 2025-05-05 NOTE — TELEPHONE ENCOUNTER
Was entering a little bit less Insulin to Carb Ratio (about 5g less) and noticed blood glucose was going up; so he put a little more (5g of cabs more if 300 or higher and then another 5g if BG continued high) and then went too down.    Yesterday he put the exact amount of grams all day.      Hyperglycemia   Onset/pattern of hyperglycemia: Yesterday (2025) morning  improved after changing previous  pod    Insulin and Dexcom were not connecting. When he noticed, a few days after, he did troubleshoot both Omnipod and CGM and place new pod  - then blood glucose improved - now it went low.    Symptoms: denies    Steroid therapy: denies    Acute Illness: denies  States he is taking Sterala q 30 days and entyvio every month immunosuppressants.    List DM Medications/Compliance:  - Metformin 1,000mg twice daily ---> confirms  - Trulicity 0.75mg once weekly ----> has it, will start    For patients on pump therapy:   When was the last site/infusion set changed? Right leg to Left legs - patient states he was rotating between bot thighs trying to place 1 inch aprt from last location.  Suggested patient to rotate areas as well - explained scar tissue and absorbance.    Compromised site (any leaking)? Before there was leaking on the one before      Hypoglycemia  Onset/Pattern of hypoglycemia: 1st time was this night in a while    BG levels: sent to Provider    Symptoms: diaphoresis    Hypoglycemia Mx/Rule of 15: treated with 2 soda cans 3 sugar spoons diluted in water.    Did not test fingerstick - \"only use dexcom\" - advised patient to check with fingerstick when he is safe - mentioned possible pressure lows - she stated understanding.    Change in Diet: denies      For patient on pump therapy:     When are they giving their bolus each meal before or after? Yes - per carb count    Over-correction (when was the last insulin administration and how much)?   8-9 PM gave 9 Units

## 2025-05-05 NOTE — TELEPHONE ENCOUNTER
Patient states that his sugar went as low as 40 last night.  Patient states this morning it is 200.  Please call.

## 2025-05-07 ENCOUNTER — OFFICE VISIT (OUTPATIENT)
Age: 40
End: 2025-05-07
Attending: INTERNAL MEDICINE
Payer: MEDICAID

## 2025-05-07 VITALS
OXYGEN SATURATION: 98 % | TEMPERATURE: 99 F | DIASTOLIC BLOOD PRESSURE: 75 MMHG | HEART RATE: 88 BPM | SYSTOLIC BLOOD PRESSURE: 124 MMHG | RESPIRATION RATE: 16 BRPM

## 2025-05-07 DIAGNOSIS — D50.8 IRON DEFICIENCY ANEMIA SECONDARY TO INADEQUATE DIETARY IRON INTAKE: Primary | ICD-10-CM

## 2025-05-07 DIAGNOSIS — K90.89 OTHER SPECIFIED INTESTINAL MALABSORPTION (HCC): ICD-10-CM

## 2025-05-07 DIAGNOSIS — D50.0 IRON DEFICIENCY ANEMIA DUE TO CHRONIC BLOOD LOSS: ICD-10-CM

## 2025-05-07 RX ORDER — CYANOCOBALAMIN 1000 UG/ML
1000 INJECTION, SOLUTION INTRAMUSCULAR; SUBCUTANEOUS ONCE
Start: 2025-05-21 | End: 2025-05-21

## 2025-05-07 RX ORDER — CYANOCOBALAMIN 1000 UG/ML
1000 INJECTION, SOLUTION INTRAMUSCULAR; SUBCUTANEOUS ONCE
Status: COMPLETED | OUTPATIENT
Start: 2025-05-07 | End: 2025-05-07

## 2025-05-07 RX ORDER — CYANOCOBALAMIN 1000 UG/ML
INJECTION, SOLUTION INTRAMUSCULAR; SUBCUTANEOUS
Status: COMPLETED
Start: 2025-05-07 | End: 2025-05-07

## 2025-05-07 RX ADMIN — CYANOCOBALAMIN 1000 MCG: 1000 INJECTION, SOLUTION INTRAMUSCULAR; SUBCUTANEOUS at 13:01:00

## 2025-05-07 NOTE — PROGRESS NOTES
Presents for every 4 week venofer 300mg and b12 injection. Offers no complaints.    B12 given to right deltoid-tolerated well.      Discharged stable.    Aware of future appointments.

## 2025-05-13 ENCOUNTER — APPOINTMENT (OUTPATIENT)
Age: 40
End: 2025-05-13
Attending: INTERNAL MEDICINE
Payer: MEDICAID

## 2025-05-30 ENCOUNTER — OFFICE VISIT (OUTPATIENT)
Age: 40
End: 2025-05-30
Attending: INTERNAL MEDICINE
Payer: MEDICAID

## 2025-05-30 VITALS
DIASTOLIC BLOOD PRESSURE: 69 MMHG | SYSTOLIC BLOOD PRESSURE: 119 MMHG | HEART RATE: 82 BPM | RESPIRATION RATE: 18 BRPM | TEMPERATURE: 98 F | OXYGEN SATURATION: 99 %

## 2025-05-30 DIAGNOSIS — D50.8 IRON DEFICIENCY ANEMIA SECONDARY TO INADEQUATE DIETARY IRON INTAKE: Primary | ICD-10-CM

## 2025-05-30 DIAGNOSIS — D50.0 IRON DEFICIENCY ANEMIA DUE TO CHRONIC BLOOD LOSS: ICD-10-CM

## 2025-05-30 DIAGNOSIS — K90.89 OTHER SPECIFIED INTESTINAL MALABSORPTION (HCC): ICD-10-CM

## 2025-05-30 RX ORDER — CYANOCOBALAMIN 1000 UG/ML
1000 INJECTION, SOLUTION INTRAMUSCULAR; SUBCUTANEOUS ONCE
Start: 2025-06-04 | End: 2025-06-04

## 2025-05-30 RX ORDER — CYANOCOBALAMIN 1000 UG/ML
1000 INJECTION, SOLUTION INTRAMUSCULAR; SUBCUTANEOUS ONCE
Status: COMPLETED | OUTPATIENT
Start: 2025-05-30 | End: 2025-05-30

## 2025-05-30 RX ORDER — CYANOCOBALAMIN 1000 UG/ML
INJECTION, SOLUTION INTRAMUSCULAR; SUBCUTANEOUS
Status: COMPLETED
Start: 2025-05-30 | End: 2025-05-30

## 2025-05-30 RX ADMIN — CYANOCOBALAMIN 1000 MCG: 1000 INJECTION, SOLUTION INTRAMUSCULAR; SUBCUTANEOUS at 14:17:00

## 2025-05-30 NOTE — PROGRESS NOTES
Pt here for venofer 300 [id number] and B12 injection, of note pt 5 days early-ok with SALVADOR Carsontyesha to give doses early.  Adjusted schedule for correct timing moving forward. Pt denies any issues or concerns. PIV started with good blood return-venofer given without complaints, VSS.  B12 given as ordered-tolerated well.  Dc'd without complaints.  Aware of future visits.      Ordering Provider: Harshal  Order Exp: completion of order set     Pt tolerated infusion without difficulty or complaint. Reviewed next apt date/time: 6/27      Education Record  Learner:  Patient  Disease / Diagnosis: anemia  Barriers / Limitations:  None  Method:  Discussion  General Topics:  Medication, Side effects and symptom management, and Plan of care reviewed  Outcome:  Shows understanding

## 2025-06-10 ENCOUNTER — APPOINTMENT (OUTPATIENT)
Age: 40
End: 2025-06-10
Attending: INTERNAL MEDICINE
Payer: MEDICAID

## 2025-06-18 DIAGNOSIS — E11.65 TYPE 2 DIABETES MELLITUS WITH HYPERGLYCEMIA, WITH LONG-TERM CURRENT USE OF INSULIN (HCC): ICD-10-CM

## 2025-06-18 DIAGNOSIS — Z79.4 TYPE 2 DIABETES MELLITUS WITH HYPERGLYCEMIA, WITH LONG-TERM CURRENT USE OF INSULIN (HCC): ICD-10-CM

## 2025-06-18 RX ORDER — PROCHLORPERAZINE 25 MG/1
SUPPOSITORY RECTAL
Qty: 1 EACH | Refills: 1 | Status: SHIPPED | OUTPATIENT
Start: 2025-06-18

## 2025-06-18 RX ORDER — PROCHLORPERAZINE 25 MG/1
SUPPOSITORY RECTAL
Qty: 9 EACH | Refills: 1 | Status: SHIPPED | OUTPATIENT
Start: 2025-06-18

## 2025-06-18 NOTE — TELEPHONE ENCOUNTER
Endocrine Refill protocol for CGM supplies     Protocol Criteria:  PASSED Reason: N/A    If below requirement is met, send a 90-day supply with 1 refill per provider protocol.     Verify appointment with Endocrinology completed in the last 12 months or scheduled in the next 6 months     Last completed office visit:5/1/2025 Vic Moore APRN   Last completed telemed visit: Visit date not found  Next scheduled Follow up:   Future Appointments   Date Time Provider Department Center                        8/5/2025 11:30 AM Isufi, Marvina, APRN [facility]

## 2025-06-27 ENCOUNTER — OFFICE VISIT (OUTPATIENT)
Age: 40
End: 2025-06-27
Attending: INTERNAL MEDICINE
Payer: MEDICAID

## 2025-06-27 ENCOUNTER — TELEPHONE (OUTPATIENT)
Age: 40
End: 2025-06-27

## 2025-06-27 VITALS
SYSTOLIC BLOOD PRESSURE: 116 MMHG | DIASTOLIC BLOOD PRESSURE: 76 MMHG | RESPIRATION RATE: 18 BRPM | HEART RATE: 97 BPM | OXYGEN SATURATION: 98 % | TEMPERATURE: 99 F

## 2025-06-27 VITALS
SYSTOLIC BLOOD PRESSURE: 111 MMHG | HEART RATE: 84 BPM | RESPIRATION RATE: 18 BRPM | DIASTOLIC BLOOD PRESSURE: 74 MMHG | TEMPERATURE: 99 F | OXYGEN SATURATION: 98 %

## 2025-06-27 DIAGNOSIS — E55.9 VITAMIN D INSUFFICIENCY: ICD-10-CM

## 2025-06-27 DIAGNOSIS — D50.0 IRON DEFICIENCY ANEMIA DUE TO CHRONIC BLOOD LOSS: ICD-10-CM

## 2025-06-27 DIAGNOSIS — K90.89 OTHER SPECIFIED INTESTINAL MALABSORPTION (HCC): ICD-10-CM

## 2025-06-27 DIAGNOSIS — K90.9 IRON MALABSORPTION (HCC): ICD-10-CM

## 2025-06-27 DIAGNOSIS — R79.89 ABNORMAL LFTS: ICD-10-CM

## 2025-06-27 DIAGNOSIS — D50.0 IRON DEFICIENCY ANEMIA DUE TO CHRONIC BLOOD LOSS: Primary | ICD-10-CM

## 2025-06-27 DIAGNOSIS — D50.8 IRON DEFICIENCY ANEMIA SECONDARY TO INADEQUATE DIETARY IRON INTAKE: ICD-10-CM

## 2025-06-27 DIAGNOSIS — E53.8 B12 DEFICIENCY: ICD-10-CM

## 2025-06-27 DIAGNOSIS — E55.9 VITAMIN D INSUFFICIENCY: Primary | ICD-10-CM

## 2025-06-27 LAB
ALBUMIN SERPL-MCNC: 4.4 G/DL (ref 3.2–4.8)
ALP LIVER SERPL-CCNC: 140 U/L (ref 45–117)
ALT SERPL-CCNC: 24 U/L (ref 10–49)
AST SERPL-CCNC: 34 U/L (ref ?–34)
BASOPHILS # BLD AUTO: 0.06 X10(3) UL (ref 0–0.2)
BASOPHILS NFR BLD AUTO: 0.9 %
BILIRUB DIRECT SERPL-MCNC: <0.1 MG/DL (ref ?–0.3)
BILIRUB SERPL-MCNC: 0.3 MG/DL (ref 0.3–1.2)
DEPRECATED HBV CORE AB SER IA-ACNC: 19 NG/ML (ref 50–336)
DEPRECATED RDW RBC AUTO: 36.8 FL (ref 35.1–46.3)
EOSINOPHIL # BLD AUTO: 0.06 X10(3) UL (ref 0–0.7)
EOSINOPHIL NFR BLD AUTO: 0.9 %
ERYTHROCYTE [DISTWIDTH] IN BLOOD BY AUTOMATED COUNT: 15.1 % (ref 11–15)
HCT VFR BLD AUTO: 35.5 % (ref 39–53)
HGB BLD-MCNC: 10.8 G/DL (ref 13–17.5)
IMM GRANULOCYTES # BLD AUTO: 0.04 X10(3) UL (ref 0–1)
IMM GRANULOCYTES NFR BLD: 0.6 %
IRON SATN MFR SERPL: 4 % (ref 20–50)
IRON SERPL-MCNC: 14 UG/DL (ref 65–175)
LYMPHOCYTES # BLD AUTO: 1.75 X10(3) UL (ref 1–4)
LYMPHOCYTES NFR BLD AUTO: 27.1 %
MCH RBC QN AUTO: 20.7 PG (ref 26–34)
MCHC RBC AUTO-ENTMCNC: 30.4 G/DL (ref 31–37)
MCV RBC AUTO: 67.9 FL (ref 80–100)
MONOCYTES # BLD AUTO: 0.34 X10(3) UL (ref 0.1–1)
MONOCYTES NFR BLD AUTO: 5.3 %
NEUTROPHILS # BLD AUTO: 4.21 X10 (3) UL (ref 1.5–7.7)
NEUTROPHILS # BLD AUTO: 4.21 X10(3) UL (ref 1.5–7.7)
NEUTROPHILS NFR BLD AUTO: 65.2 %
PLATELET # BLD AUTO: 347 10(3)UL (ref 150–450)
PROT SERPL-MCNC: 6.8 G/DL (ref 5.7–8.2)
RBC # BLD AUTO: 5.23 X10(6)UL (ref 4.3–5.7)
TOTAL IRON BINDING CAPACITY: 361 UG/DL (ref 250–425)
TRANSFERRIN SERPL-MCNC: 290 MG/DL (ref 215–365)
VIT D+METAB SERPL-MCNC: 18.9 NG/ML (ref 30–100)
WBC # BLD AUTO: 6.5 X10(3) UL (ref 4–11)

## 2025-06-27 RX ORDER — CYANOCOBALAMIN 1000 UG/ML
1000 INJECTION, SOLUTION INTRAMUSCULAR; SUBCUTANEOUS ONCE
Status: COMPLETED | OUTPATIENT
Start: 2025-06-27 | End: 2025-06-27

## 2025-06-27 RX ORDER — CYANOCOBALAMIN 1000 UG/ML
INJECTION, SOLUTION INTRAMUSCULAR; SUBCUTANEOUS
Status: DISCONTINUED
Start: 2025-06-27 | End: 2025-06-27

## 2025-06-27 RX ORDER — CYANOCOBALAMIN 1000 UG/ML
1000 INJECTION, SOLUTION INTRAMUSCULAR; SUBCUTANEOUS ONCE
Start: 2025-07-04 | End: 2025-07-04

## 2025-06-27 RX ADMIN — CYANOCOBALAMIN 1000 MCG: 1000 INJECTION, SOLUTION INTRAMUSCULAR; SUBCUTANEOUS at 14:45:00

## 2025-06-27 NOTE — PROGRESS NOTES
FLOR     Ariel Hunter is a 39 year old male who is here today for follow-up of of   Encounter Diagnoses   Name Primary?    Vitamin D insufficiency Yes    Abnormal LFTs     Iron deficiency anemia due to chronic blood loss     B12 deficiency     Iron malabsorption (HCC)      Iron therapy:  by intravenous injection.  The patient had reaction to iron dextran.  Therefore, he completed his course of intravenous iron therapy with 5 doses of 200 mg of Venofer each, this was on 05/24/21.  He is on maintenance venofer 200mg monthly.  Last dose today.    He is also receiving intramuscular B12 injections which he is tolerating well.    Not taking the folic acid in the past per prior notes.    He is leaving for [facility] on 3/5/25 for 6 weeks.      Symptoms:  Fatigue:  better, denies fatigue  CH/SOB:  better/denies   CP and/or palpitations:  none related to anemia.  Headaches:  denies  Dizziness or lightheadedness:  denies  Pica:  none     States Crohn's was not well controlled and was having lot of small bowel obstructions.  Now on two agents for the colitis and has been on 8 months of these agents w/o any obstruction.  Had colonoscopy 13 days ago and no inflammation on bx.       Presents today for maintenance Venofer 300 mg and Vitamin B12 injection.  Feels well.  Requesting LFTs be checked since in April 2025, after his [facility] admission for sepsis from a diabetic foot ulcer, they were elevated.  Also requesting vitamin D be checked.  He states initially he was on once weekly vitamin D supplements and was instructed to take 2000IU daily but has not.      Review of Systems:   Review of Systems   Constitutional:  Negative for appetite change, fatigue, fever and unexpected weight change.   HENT:   Negative for nosebleeds.    Respiratory:  Negative for cough and shortness of breath.    Cardiovascular:  Negative for chest pain, leg swelling and palpitations.   Gastrointestinal:  Negative for abdominal pain, blood in stool,  constipation, diarrhea and nausea.   Musculoskeletal:  Negative for myalgias.   Skin:  Negative for rash.   Neurological:  Negative for dizziness, headaches, light-headedness and numbness.   Hematological:  Does not bruise/bleed easily.   Psychiatric/Behavioral:  Negative for sleep disturbance.            Current Outpatient Medications   Medication Sig Dispense Refill    Continuous Glucose Sensor (DEXCOM G6 SENSOR) Does not apply Misc Change sensor every 10 days 9 each 1    Continuous Glucose Transmitter (DEXCOM G6 TRANSMITTER) Does not apply Misc TEST BLOOD GLUCOSE AS DIRECTED WITH DEXCOM G6 SENSOR 1 each 1    Dulaglutide (TRULICITY) 0.75 MG/0.5ML Subcutaneous Solution Auto-injector Inject 0.75 mg into the skin once a week. 6 mL 1    Insulin Disposable Pump (OMNIPOD 5 MWGS0H0 PODS GEN 5) Does not apply Misc Inject 1 each into the skin every other day. Please dispense 9 boxes of 5 pods in each. Total 45 pods. 9 each 1    metFORMIN HCl 1000 MG Oral Tab Take 1 tablet (1,000 mg total) by mouth 2 (two) times daily with meals. 180 tablet 1    insulin lispro 100 UNIT/ML Injection Solution INJECT 80 UNITS VIA INSULIN PUMP EVERY DAY 75 mL 0    ATORVASTATIN 10 MG Oral Tab TAKE 1 TABLET(10 MG) BY MOUTH EVERY NIGHT 90 tablet 0    VEDOLIZUMAB IV Inject into the vein.      hydrocortisone 2.5 % External Cream APPLY EVERY MORNING AND EVERY EVENING WITH FLARES OF FACE RASH (Patient not taking: Reported on 4/8/2025) 30 g 1    ONETOUCH ULTRA In Vitro Strip Check blood sugar up to 3x daily 300 each 1    folic acid 1 MG Oral Tab Take 1 tablet (1 mg total) by mouth daily. (Patient not taking: Reported on 4/8/2025) 90 tablet 3    Dulaglutide (TRULICITY) 0.75 MG/0.5ML Subcutaneous Solution Pen-injector Inject 0.75 mg into the skin once a week. 6 mL 0    ketoconazole 2 % External Shampoo Use 2-3 times weekly. Lather into scalp, beard, and face and leave on for 5 minutes before washing off. (Patient not taking: Reported on 4/8/2025) 120 mL  11    fluocinonide 0.05 % External Ointment APPLY TO AFFECTED AREA IN SCALP TWICE DAILY WITH FLARES 60 g 2    minoxidil 2.5 MG Oral Tab Take 1 tablet (2.5 mg total) by mouth daily. (Patient not taking: Reported on 4/8/2025) 180 tablet 0    OneTouch Delica Lancets 33G Does not apply Misc Use to check blood sugar 3x daily 300 each 1    Blood Glucose Monitoring Suppl (ONETOUCH ULTRA 2) w/Device Does not apply Kit Test 3x daily 1 kit 0    DEXCOM G6  Does not apply Device 1 Device daily. 1 each 0    Insulin Disposable Pump (OMNIPOD 5 G6 INTRO, GEN 5,) Does not apply Kit 1 each As Directed. 1 kit 0    predniSONE 20 MG Oral Tab Take 2 tablets (40 mg total) by mouth daily. (Patient not taking: Reported on 4/8/2025) 28 tablet 0    Insulin Pen Needle (TRUEPLUS 5-BEVEL PEN NEEDLES) 32G X 4 MM Does not apply Misc USE TO INJECT INSULIN UP TO THREE TIMES DAILY 100 each 0    montelukast 10 MG Oral Tab Take 1 tablet (10 mg total) by mouth every morning.      Vitamin D3, Cholecalciferol, (VITAMIN D3) 125 MCG (5000 UT) Oral Cap Take 1 capsule (5,000 Units total) by mouth daily.      STELARA 90 MG/ML Subcutaneous Solution Prefilled Syringe injection Inject into the skin every 3 (three) months. Takes every 2 months subcut       Allergies:   Allergies   Allergen Reactions    Infed [Iron Dextran] HIVES    Ciprofloxacin      Other reaction(s): burning urination       Past Medical History:    Asthma, cough variant (HCC)    Calculus of kidney    Crohn disease (HCC)    Crohn's disease (HCC)    Diabetes (HCC)    Iron deficiency anemia     Past Surgical History:   Procedure Laterality Date    Colonoscopy N/A 6/12/2017    Procedure: COLONOSCOPY;  Surgeon: Naomi Moffett MD;  [facility] ENDOSCOPY    Colostomy       Social History     Socioeconomic History    Marital status:    Tobacco Use    Smoking status: Never     Passive exposure: Never    Smokeless tobacco: Never   Substance and Sexual Activity    Alcohol use: Never     Drug use: Never   Other Topics Concern    Reaction to local anesthetic No    Pt has a pacemaker No    Pt has a defibrillator No     Social Drivers of Health     Food Insecurity: No Food Insecurity (4/9/2025)    Received from [facility]    Food Insecurity     Currently or in the past 3 months, have you worried your food would run out before you had money to buy more?: No     In the past 12 months, have you run out of food or been unable to get more?: No   Transportation Needs: No Transportation Needs (4/9/2025)    Received from [facility]    Transportation Needs     Currently or in the past 3 months, has lack of transportation kept you from medical appointments, getting food or medicine, or providing care to a family member?: No     Medical Transportation Needs?: No   Housing Stability: Low Risk  (12/8/2023)    Received from [facility]    Housing Stability Vital Sign     Unable to Pay for Housing in the Last Year: No     Number of Places Lived in the Last Year: 1     Unstable Housing in the Last Year: No       Family History   Problem Relation Age of Onset    Diabetes Mother     Diabetes Father     Glaucoma Neg     Macular degeneration Neg     Cataracts Neg          PHYSICAL EXAM:    /76 (BP Location: Left arm, Patient Position: Sitting, Cuff Size: adult)   Pulse 97   Temp 98.5 °F (36.9 °C) (Oral)   Resp 18   SpO2 98%   Wt Readings from Last 6 Encounters:   05/01/25 75.8 kg (167 lb)   04/08/25 74.8 kg (165 lb)   03/11/25 75.9 kg (167 lb 6.4 oz)   03/03/25 77.4 kg (170 lb 9.6 oz)   02/25/25 78.9 kg (174 lb)   02/11/25 79.4 kg (175 lb)     Physical Exam  General: Patient is alert, not in acute distress.  HEENT: EOMs intact. Anicteric,   Neck: No JVD. No palpable lymphadenopathy. Neck is supple.  Chest: Clear to auscultation.  Heart: Regular rate and rhythm.   Abdomen: Non distended, Ileostomy on the right upper quadrant  Extremities: No edema, attn left great toe - no  discoloration, cap refill < 2 sec, normal temperature bilaterally  Neurological: Grossly intact.   Psych/Depression: nl        ASSESSMENT/PLAN:     Encounter Diagnoses   Name Primary?    Vitamin D insufficiency Yes    Abnormal LFTs     Iron deficiency anemia due to chronic blood loss     B12 deficiency     Iron malabsorption (HCC)        Patient has anemia due to chronic iron deficiency secondary to poor absorption as a sequela of Crohn's disease and its management.  He has had parenteral iron replacement in the past and has responded and tolerated well.  In addition, he has B12 deficiency and has been off supplementation.  Also noted to have vitamin D deficiency.    Given the patient's severe microcytosis, suspect he also has thalassemia trait, which will test at a later time after iron is replaced.     --Patient has received iron loads with Venofer in the past and has tolerated well.  He did have reaction to iron dextran in the past.  He has been on monthly iron infusions with Venofer 200 mg.  Currently he is anemia has worsened.  His iron studies are pending, I do suspect that his iron deficiency has worsened as well.  Will proceed with repeat iron load with Venofer, will administer 400 mg since 3 doses (12/23/24, 3/3/25 & 3/11/25), and then return to maintenance.  Venofer 300 mg once monthly resumed on 5/7/25.  Monitor CBC and iron studies every 2-3 months.  Pending today.     --B12 deficiency, he is now on intramuscular B12 once a month.  Continue lifelong.  B12 levels now normal - last drawn on 4/12/24.  We will need to repeat B12 levels as long as patient is on B12 replacement therapy.  Check in July 2025.    --Continue to take folic acid once daily.    --Vitamin D deficiency:  Daily vitamin D 5000IU.  Per patient instructed to take 2000IU once daily as maintenance.  Has not been taking.  Check vitamin D level today.    --[facility] admission from 4/8/25 - 4/15/25, for sepsis from diabetic foot ulcer,  healed.  Per patient, elevated LFTs (4/14/25 & 4/15/25).  4/28/25 GGT normal and Alk phos slightly elevated at 128 - alk phos isoenzymes checked on 5/1/25.  Will redraw to confirm normalized.      Call prn,     Follow up in 3 months      Orders Placed This Encounter   Procedures    Vitamin D    HEPATIC FUNCTION PANEL [E]     MDM high risk    Results From Past 48 Hours:  Recent Results (from the past 48 hours)   CBC W/DIFF [E]    Collection Time: 06/27/25  2:41 PM   Result Value Ref Range    WBC 6.5 4.0 - 11.0 x10(3) uL    RBC 5.23 4.30 - 5.70 x10(6)uL    HGB 10.8 (L) 13.0 - 17.5 g/dL    HCT 35.5 (L) 39.0 - 53.0 %    MCV 67.9 (L) 80.0 - 100.0 fL    MCH 20.7 (L) 26.0 - 34.0 pg    MCHC 30.4 (L) 31.0 - 37.0 g/dL    RDW-SD 36.8 35.1 - 46.3 fL    RDW 15.1 (H) 11.0 - 15.0 %    .0 150.0 - 450.0 10(3)uL    Neutrophil Absolute Prelim 4.21 1.50 - 7.70 x10 (3) uL    Neutrophil Absolute 4.21 1.50 - 7.70 x10(3) uL    Lymphocyte Absolute 1.75 1.00 - 4.00 x10(3) uL    Monocyte Absolute 0.34 0.10 - 1.00 x10(3) uL    Eosinophil Absolute 0.06 0.00 - 0.70 x10(3) uL    Basophil Absolute 0.06 0.00 - 0.20 x10(3) uL    Immature Granulocyte Absolute 0.04 0.00 - 1.00 x10(3) uL    Neutrophil % 65.2 %    Lymphocyte % 27.1 %    Monocyte % 5.3 %    Eosinophil % 0.9 %    Basophil % 0.9 %    Immature Granulocyte % 0.6 %       Imaging & Referrals:  None     Component      Latest Ref Rng 12/17/2024 1/17/2025 2/11/2025   WBC      4.0 - 11.0 x10(3) uL 5.6  6.5  9.5    RBC      4.30 - 5.70 x10(6)uL 5.06  5.43  5.66    Hemoglobin      13.0 - 17.5 g/dL 7.5 (L)  9.9 (L)  10.4 (L)    Hematocrit      39.0 - 53.0 % 27.8 (L)  33.9 (L)  35.9 (L)    MCV      80.0 - 100.0 fL 54.9 (L)  62.4 (L)  63.4 (L)    MCH      26.0 - 34.0 pg 14.8 (L)  18.2 (L)  18.4 (L)    MCHC      31.0 - 37.0 g/dL 27.0 (L)  29.2 (L)  29.0 (L)    RDW-SD      35.1 - 46.3 fL 40.1  58.4 (H)  53.9 (H)    RDW      11.0 - 15.0 % 21.9 (H)  27.1 (H)  24.9 (H)    Platelet Count      150.0 -  450.0 10(3)uL 304.0  265.0  329.0    Immature Platelet Fraction      0.0 - 7.0 % 5.6  4.3  3.6    Prelim Neutrophil Abs      1.50 - 7.70 x10 (3) uL 3.04  3.71  7.44    Neutrophils Absolute      1.50 - 7.70 x10(3) uL 3.04  3.71  7.44    Lymphocytes Absolute      1.00 - 4.00 x10(3) uL 1.99  2.19  1.53    Monocytes Absolute      0.10 - 1.00 x10(3) uL 0.44  0.45  0.45    Eosinophils Absolute      0.00 - 0.70 x10(3) uL 0.10  0.10  0.05    Basophils Absolute      0.00 - 0.20 x10(3) uL 0.04  0.05  0.04    Immature Granulocyte Absolute      0.00 - 1.00 x10(3) uL 0.02  0.02  0.02    Neutrophils %      % 54.0  56.9  78.1    Lymphocytes %      % 35.3  33.6  16.1    Monocytes %      % 7.8  6.9  4.7    Eosinophils %      % 1.8  1.5  0.5    Basophils %      % 0.7  0.8  0.4    Immature Granulocyte %      % 0.4  0.3  0.2    Iron, Serum      65 - 175 ug/dL 11 (L)  22 (L)  18 (L)    Transferrin      215 - 365 mg/dL 324  281  277    Iron Bind.Cap.(TIBC)      250 - 425 ug/dL 483 (H)  419  364    Iron Saturation      20 - 50 % 2 (L)  5 (L)  5 (L)    FERRITIN      50 - 336 ng/mL 4 (L)  16 (L)  14 (L)

## 2025-06-27 NOTE — PROGRESS NOTES
Pt here for venofer 300 B12 injection and labs. Pt denies any issues or concerns.  Labs drawn as ordered-B12 given to R deltoid, tolerated well.  PIV with good blood return-venofer given without complaints, VSS post infusion.  PIV dc'd and pt left without complaints.      Ordering Provider: Girish  Order Exp: completion of order set     Pt tolerated infusion without difficulty or complaint. Reviewed next apt date/time: 7/25      Education Record  Learner:  Patient  Disease / Diagnosis: anemia   Barriers / Limitations:  None  Method:  Discussion  General Topics:  Medication, Side effects and symptom management, and Plan of care reviewed  Outcome:  Shows understanding

## 2025-07-08 RX ORDER — ATORVASTATIN CALCIUM 10 MG/1
10 TABLET, FILM COATED ORAL NIGHTLY
Qty: 90 TABLET | Refills: 0 | Status: SHIPPED | OUTPATIENT
Start: 2025-07-08

## 2025-07-08 RX ORDER — INSULIN LISPRO 100 [IU]/ML
INJECTION, SOLUTION INTRAVENOUS; SUBCUTANEOUS
Qty: 75 ML | Refills: 0 | Status: SHIPPED | OUTPATIENT
Start: 2025-07-08

## 2025-07-08 NOTE — TELEPHONE ENCOUNTER
Endocrine refill protocol for lipid lowering medications    Protocol Criteria:  FAILED Reason: No labs completed in required time frame    If all below requirements are met, send a 90-day supply with 1 refill per provider protocol.    Verify appointment with Endocrinology completed in the last 6 months or scheduled in the next 3 months.  Lipid panel must have been completed in the last 12 months   ALT result below 80  LDL result below 130    Last completed office visit:5/1/2025 Vic Moore APRN   Last completed telemed visit: Visit date not found  Next scheduled Follow up:   Future Appointments   Date Time Provider Department Center   7/25/2025  2:00 PM EL[facility] INFRN 5 [facility] SW Inf [place]   8/5/2025 11:30 AM Vic Moore APRN [email or web address]  [facility]   8/22/2025  2:00 PM ELM CC INFRN 1 [facility] SW Inf [place]   9/19/2025 12:30 PM ELM CC LAB1 ELM SW Inf [place]   9/19/2025  1:30 PM Breann Stout APRN ELMSW HemOnc [place]   9/19/2025  2:00 PM ELM CC INFRN 1 ELM SW Inf [place]   11/4/2025  1:00 PM Tahir Gomez MD [id number]      Last Lipid panel date: Cholesterol: 100, done on 12/5/2020.  HDL Cholesterol: 31, done on 12/5/2020.  TriGlycerides 76, done on 12/5/2020.  LDL Cholesterol: 54, done on 12/5/2020.     Last ALT result: Last ALT was 24 done on 6/27/2025.  Last AST was 34 done on 6/27/2025.

## 2025-07-08 NOTE — TELEPHONE ENCOUNTER
Endocrine refill protocol for rapid acting, regular, intermediate, and mixed insulin:    Protocol Criteria:  FAILED Reason: Elevated A1C    If all below requirements are met, send a 90-day supply with 1 refill per provider protocol.    Verify appointment with Endocrinology completed in the last 6 months or scheduled in the next 3 months.  Verify A1C has been completed within the last 6 months and is below 8.5%    -May substitute prescriptions for Novolog and Humalog unless documented allergy (pens and vials) at the same dose and concentration per insurance preference and provider protocol.   -May substitute prescriptions for Novolin R and Humulin R unless documented allergy (pens and vials) at the same dose and concentration per insurance preference and provider protocol.   -May substitute prescriptions for Novolin N and Humulin N unless documented allergy (pens and vials) at the same dose and concentration per insurance preference and provider protocol.   -May substitute prescriptions for Humulin and Novolin 70/30 insulin unless documented allergy at the same dose and concentration per insurance preference and provider protocol.    Last completed office visit:5/1/2025 Vic Moore APRN   Last completed telemed visit: Visit date not found  Next scheduled Follow up:   Future Appointments   Date Time Provider Department Center          8/5/2025 11:30 AM Vic Moore APRN [facility]                                         Last A1C result: 9.1% done 5/1/2025.

## 2025-07-24 RX ORDER — CYANOCOBALAMIN 1000 UG/ML
1000 INJECTION, SOLUTION INTRAMUSCULAR; SUBCUTANEOUS ONCE
Start: 2025-07-24 | End: 2025-07-24

## 2025-08-05 ENCOUNTER — OFFICE VISIT (OUTPATIENT)
Dept: ENDOCRINOLOGY CLINIC | Facility: CLINIC | Age: 40
End: 2025-08-05

## 2025-08-05 VITALS
BODY MASS INDEX: 24.45 KG/M2 | HEIGHT: 67 IN | RESPIRATION RATE: 18 BRPM | WEIGHT: 155.81 LBS | DIASTOLIC BLOOD PRESSURE: 77 MMHG | SYSTOLIC BLOOD PRESSURE: 112 MMHG | HEART RATE: 103 BPM

## 2025-08-05 DIAGNOSIS — Z79.4 TYPE 2 DIABETES MELLITUS WITH HYPERGLYCEMIA, WITH LONG-TERM CURRENT USE OF INSULIN (HCC): Primary | ICD-10-CM

## 2025-08-05 DIAGNOSIS — E11.65 TYPE 2 DIABETES MELLITUS WITH HYPERGLYCEMIA, WITH LONG-TERM CURRENT USE OF INSULIN (HCC): Primary | ICD-10-CM

## 2025-08-05 LAB
HEMOGLOBIN A1C: 11.7 % (ref 4.3–5.6)
TEST STRIP LOT #: NORMAL NUMERIC

## 2025-08-05 RX ORDER — DULAGLUTIDE 1.5 MG/.5ML
1.5 INJECTION, SOLUTION SUBCUTANEOUS WEEKLY
Qty: 6 ML | Refills: 1 | Status: SHIPPED | OUTPATIENT
Start: 2025-08-05

## 2025-08-05 RX ORDER — INSULIN LISPRO 100 [IU]/ML
INJECTION, SOLUTION INTRAVENOUS; SUBCUTANEOUS
Qty: 75 ML | Refills: 0 | Status: SHIPPED | OUTPATIENT
Start: 2025-08-05

## 2025-08-18 ENCOUNTER — NURSE ONLY (OUTPATIENT)
Dept: ENDOCRINOLOGY CLINIC | Facility: CLINIC | Age: 40
End: 2025-08-18

## 2025-08-18 DIAGNOSIS — Z79.4 TYPE 2 DIABETES MELLITUS WITH HYPERGLYCEMIA, WITH LONG-TERM CURRENT USE OF INSULIN (HCC): Primary | ICD-10-CM

## 2025-08-18 DIAGNOSIS — E11.65 TYPE 2 DIABETES MELLITUS WITH HYPERGLYCEMIA, WITH LONG-TERM CURRENT USE OF INSULIN (HCC): Primary | ICD-10-CM

## 2025-08-18 PROCEDURE — 99211 OFF/OP EST MAY X REQ PHY/QHP: CPT | Performed by: NURSE PRACTITIONER

## 2025-08-19 RX ORDER — ACYCLOVIR 400 MG/1
1 TABLET ORAL
Qty: 9 EACH | Refills: 1 | Status: SHIPPED | OUTPATIENT
Start: 2025-08-19

## 2025-08-22 ENCOUNTER — OFFICE VISIT (OUTPATIENT)
Facility: LOCATION | Age: 40
End: 2025-08-22
Attending: INTERNAL MEDICINE

## 2025-08-22 VITALS
BODY MASS INDEX: 26 KG/M2 | TEMPERATURE: 99 F | HEART RATE: 90 BPM | WEIGHT: 164.13 LBS | OXYGEN SATURATION: 98 % | SYSTOLIC BLOOD PRESSURE: 119 MMHG | DIASTOLIC BLOOD PRESSURE: 73 MMHG | RESPIRATION RATE: 16 BRPM

## 2025-08-22 DIAGNOSIS — K90.89 OTHER SPECIFIED INTESTINAL MALABSORPTION (HCC): ICD-10-CM

## 2025-08-22 DIAGNOSIS — D50.8 IRON DEFICIENCY ANEMIA SECONDARY TO INADEQUATE DIETARY IRON INTAKE: Primary | ICD-10-CM

## 2025-08-22 DIAGNOSIS — D50.0 IRON DEFICIENCY ANEMIA DUE TO CHRONIC BLOOD LOSS: ICD-10-CM

## 2025-08-22 RX ORDER — CYANOCOBALAMIN 1000 UG/ML
INJECTION, SOLUTION INTRAMUSCULAR; SUBCUTANEOUS
Status: COMPLETED
Start: 2025-08-22 | End: 2025-08-22

## 2025-08-22 RX ORDER — CYANOCOBALAMIN 1000 UG/ML
1000 INJECTION, SOLUTION INTRAMUSCULAR; SUBCUTANEOUS ONCE
Start: 2025-08-29 | End: 2025-08-29

## 2025-08-22 RX ORDER — CYANOCOBALAMIN 1000 UG/ML
1000 INJECTION, SOLUTION INTRAMUSCULAR; SUBCUTANEOUS ONCE
Status: COMPLETED | OUTPATIENT
Start: 2025-08-22 | End: 2025-08-22

## 2025-08-22 RX ADMIN — CYANOCOBALAMIN 1000 MCG: 1000 INJECTION, SOLUTION INTRAMUSCULAR; SUBCUTANEOUS at 16:28:00

## (undated) DEVICE — FORCEP RADIAL JAW 4

## (undated) DEVICE — Device: Brand: DEFENDO AIR/WATER/SUCTION AND BIOPSY VALVE

## (undated) DEVICE — ENDOSCOPY PACK - LOWER: Brand: MEDLINE INDUSTRIES, INC.

## (undated) NOTE — Clinical Note
Hi!    I met with Ariel to review his blood sugars and nutrition. I increased his Levemir to 10 units and he started taking 4 units of humalog with breakfast and dinner which seems to bring him down nicely. He is wondering if he should start taking humalog at lunch. Because you had previously told him to hold off on humalog, I wanted to check in with you (see TE 9/23/2022). We also reviewed in depth about nutrition which gets tricky with his Crohns disease. He sees you in 1 month and will try to keep a food log so we can determine what meals sit well with him. Thank you!   Rosemarie Ho

## (undated) NOTE — LETTER
11/19/21        Patient: Raad Lemon   YOB: 1985   Date of Visit: 11/19/2021       Dear  Dr. Catalino Kim MD,      Thank you for referring Raad Lemon to my practice. He has issues with malocclusion and jaw joint pain.

## (undated) NOTE — LETTER
Hospital Discharge Documentation  Please phone to schedule a hospital follow up appointment.     From: [address] [facility]  Phone: [phone]    Patient discharged time/date: 6/12/2017  6:29 PM  Patient discharge disposition:  Home or Self involved inflammation around the stoma site as well.  He rates his pain as a 7 out of 10 in severity nonradiating in nature mainly around the superior lateral side of the stoma.  Claims to have regular stool in ileostomy    DISCHARGE DX:Crohn's exacerbatio Otherwise, no alina abscess identified in the abdominal wall or in the peritoneal cavity. 3. There is mild dilatation of the afferent small bowel suggestive of ileus or partial obstruction.  Hyperemia of small bowel mucosa suggestive of inflammatory process Brayan Echeverria MD  [address]  [facility] [address]  [phone]    In 1 week            Other Discharge Instructions: None  >30 MIN SPENT ON THIS DC   BENJAMIN MIR  6/12/2017  4:45 PM               Electronically signed by Kiara Dennison,

## (undated) NOTE — MR AVS SNAPSHOT
After Visit Summary   8/6/2021    Zully Solis    MRN: [id number]           Visit Information     Date & Time  8/6/2021  4:00 PM Provider  [facility] [address] [place] [address] Dept.  Phone  [phone]      Your allergies, colds, cough, fever, rash, sore throat, headache and pink eye. The cost for a Video Visit is currently $35.         If you receive a survey from [email or web address], please take a few minutes to complete it and provide feedback.  We strive to deliver th attention, but are   non-life-threatening. Also available by appointment Average cost  $120*     EMERGENCY ROOM Life-threatening emergencies needing immediate intervention at a hospital emergency room.  Average cost  $2,300*   *Cost varies based on your

## (undated) NOTE — MR AVS SNAPSHOT
After Visit Summary   2024    Ariel Hunter   MRN: [id number]           Visit Information     Date & Time  2024  4:00 PM Provider  [facility] Department  [address] [facility] Infusion Dept. Phone  [phone]      Allergies as of 2024  Review status set to Review Complete on 2024       Noted Reaction Type Reactions    Infed [iron Dextran] 2021    HIVES    Ciprofloxacin 2016        Other reaction(s): burning urination      Your Current Medications        Dosage    fluocinonide 0.05 % External Ointment APPLY TO AFFECTED AREA IN SCALP TWICE DAILY WITH FLARES    OMNIPOD 5 G6 PODS, GEN 5, Does not apply Misc USE ONE DEVICE EVERY OTHER DAY    Continuous Blood Gluc Sensor (DEXCOM G6 SENSOR) Does not apply Misc Change sensor every 10 days    Continuous Blood Gluc Transmit (DEXCOM G6 TRANSMITTER) Does not apply Misc TEST BLOOD GLUCOSE AS DIRECTED WITH DEXCOM G6 SENSOR    metFORMIN HCl 1000 MG Oral Tab () Take 1 tablet (1,000 mg total) by mouth 2 (two) times daily.    insulin lispro (HUMALOG) 100 UNIT/ML Injection Solution Inject via insulin pump. Max daily dose 80 units.    ONETOUCH ULTRA In Vitro Strip Check blood sugar up to 3x daily    OneTouch Delica Lancets 33G Does not apply Misc Use to check blood sugar 3x daily    Blood Glucose Monitoring Suppl (ONETOUCH ULTRA 2) w/Device Does not apply Kit Test 3x daily    ketoconazole 2 % External Shampoo Use 2-3 times weekly. Lather into scalp, beard, and face and leave on for 5 minutes before washing off.    hydrocortisone 2.5 % External Cream Apply every morning and evening with flares of face rash    Dulaglutide (TRULICITY) 1.5 MG/0.5ML Subcutaneous Solution Pen-injector Inject 1.5 mg into the skin once a week.    DEXCOM G6  Does not apply Device 1 Device daily.    Insulin Disposable Pump (OMNIPOD 5 G6 INTRO, GEN 5,) Does not apply Kit 1 each As Directed.    predniSONE 20 MG Oral Tab Take 2 tablets (40  mg total) by mouth daily.    Insulin Pen Needle (TRUEPLUS 5-BEVEL PEN NEEDLES) 32G X 4 MM Does not apply Misc USE TO INJECT INSULIN UP TO THREE TIMES DAILY    montelukast 10 MG Oral Tab Take 1 tablet (10 mg total) by mouth every morning.    Vitamin D3, Cholecalciferol, (VITAMIN D3) 125 MCG (5000 UT) Oral Cap Take 1 capsule (5,000 Units total) by mouth daily.    STELARA 90 MG/ML Subcutaneous Solution Prefilled Syringe injection Inject into the skin every 3 (three) months. Takes every 2 months subcut      Diagnoses for This Visit    Iron deficiency anemia secondary to inadequate dietary iron intake   [280.1.ICD-9-CM]  -  Primary  Other specified intestinal malabsorption   [579.8.ICD-9-CM]    Iron deficiency anemia due to chronic blood loss   [905802]    B12 deficiency   [003011]             We Ordered the Following     Normal Orders This Visit    CBC W/ DIFFERENTIAL [[id number] CUSTOM]     CBC W/DIFF [E] [[id number] CUSTOM]     Future Labs/Procedures Expected by Expires    CBC W/DIFF [E] [[id number] CUSTOM]  6/20/2024 (Approximate) 6/20/2025      Future Appointments        Provider Department    7/8/2024 2:00 PM Corbin Schwartz [facility] [address], [place]    7/8/2024 4:00 PM EM CC INFRN 3 [facility]    8/2/2024 4:00 PM EM CC INFRN 6 [facility]    8/30/2024 11:45 AM Vic Moore [facility]                Did you know that [facility] [place] physicians now offer Video Visits through [employer] for adult patients for a variety of conditions such as allergies, back pain and cold symptoms? Skip the drive and waiting room and online chat with a doctor face-to-face using your web-cam enabled computer or mobile device wherever you are. Video Visits cost $50 and can be paid hassle-free using a credit, debit, or health savings card.  Not active on [employer]? Ask us how to get signed up today!          If you  receive a survey from [employer], please take a few minutes to complete it and provide feedback. We strive to deliver the best patient experience and are looking for ways to make improvements. Your feedback will help us do so. For more information on [employer], please visit [email or web address]           No text in SmartText           No text in SmartText

## (undated) NOTE — MR AVS SNAPSHOT
After Visit Summary   12/3/2021    Mike Jiang   MRN: [id number]           Visit Information     Date & Time  12/3/2021  4:30 PM Provider  [facility] [address] [facility] Dept.  Phone  [phone]      Your MyChart for adult patients for a variety of conditions such as allergies, back pain and cold symptoms? Skip the drive and waiting room and online chat with a doctor face-to-face using your web-cam enabled computer or mobile device wherever you are.  Video V

## (undated) NOTE — IP AVS SNAPSHOT
[address]  [address] [facility] [phone]                Discharge Summary   6/8/2017    Amrita Temple           Admission Information        Provider Department    6/8/2017 Inna Ghosh MD [facility] 5sw/Se Next dose due:  6/13/17 morning        Inject 40 mg into the skin once a week.  Take once weekly on tuesday                            predniSONE 20 MG Tabs   Commonly known as:  Sarina Souza   Next dose due:  6/13/17 morning        Take 2 tablets (40 mg total Abs Final Neut Abs Lymphocyte Abso Monocyte Absolu Eosinophil Abso Basophil Absolu    (06/12/17)  84 (06/12/17)  12 (06/12/17)  4 (06/12/17)  0 (06/12/17)  0 -- (06/12/17)  12.3 (H) (06/12/17)  1.7 (06/12/17)  0.6 (06/12/17)  0.0 (06/12/17)  0.0    (06/11/ coverage. Patient [address] is a Federal Navigator program that can help with your Affordable Care Act coverage, as well as all types of Medicaid plans.   To get signed up and covered, please call [phone] and ask to get set up for an insuran What to report to your healthcare team: Pain, swelling, rash, fever           Blood Sugar Medications     glimepiride 2 MG Oral Tab         Use:  Treat high blood sugar   Most common side effects: Low blood sugar:nausea, jitters, sweating, rapid heartbeat

## (undated) NOTE — MR AVS SNAPSHOT
After Visit Summary   2/28/2024    Ariel Hunter   MRN: [id number]           Visit Information     Date & Time  2/28/2024  4:00 PM Provider  [facility] Department  Angela THOMAS [facility] Infusion Dept. Phone  [phone]      Your Vitals Were  Most recent update: 2/28/2024  4:08 PM    BP   116/66 (BP Location: Right arm, Patient Position: Sitting, Cuff Size: adult)          Pulse   100          Temp   97.8 °F (36.6 °C) (Oral)          Resp   18          Ht   1.702 m (5' 7.01\")             Wt   72.4 kg (159 lb 9.6 oz)    SpO2   100%    BMI   24.99 kg/m²         Allergies as of 2/28/2024  Review status set to Review Complete on 1/8/2024       Noted Reaction Type Reactions    Infed [iron Dextran] 02/04/2021    HIVES    Ciprofloxacin 11/27/2016        Other reaction(s): burning urination      Your Current Medications        Dosage    Insulin Disposable Pump (OMNIPOD 5 G6 POD, GEN 5,) Does not apply Misc 1 Device every other day.    fluocinonide 0.05 % External Ointment Use twice daily  to affected areas in scalp with flares    ketoconazole 2 % External Shampoo Use 2-3 times weekly. Lather into scalp, beard, and face and leave on for 5 minutes before washing off.    hydrocortisone 2.5 % External Cream Apply every morning and evening with flares of face rash    Dulaglutide (TRULICITY) 1.5 MG/0.5ML Subcutaneous Solution Pen-injector Inject 1.5 mg into the skin once a week.    insulin lispro (HUMALOG) 100 UNIT/ML Injection Solution Inject via insulin pump. Max daily dose 80 units.    OneTouch Delica Lancets 33G Does not apply Misc Use to check blood sugar 3x daily    ONETOUCH ULTRA In Vitro Strip Check blood sugar up to 3x daily    Continuous Blood Gluc Transmit (DEXCOM G6 TRANSMITTER) Does not apply Misc TEST BLOOD GLUCOSE AS DIRECTED WITH DEXCOM G6 SENSOR    Continuous Blood Gluc Sensor (DEXCOM G6 SENSOR) Does not apply Misc Change sensor every 10 days    DEXCOM G6  Does not apply Device  1 Device daily.    Insulin Disposable Pump (OMNIPOD 5 G6 INTRO, GEN 5,) Does not apply Kit 1 each As Directed.    predniSONE 20 MG Oral Tab Take 2 tablets (40 mg total) by mouth daily.    Insulin Pen Needle (TRUEPLUS 5-BEVEL PEN NEEDLES) 32G X 4 MM Does not apply Misc USE TO INJECT INSULIN UP TO THREE TIMES DAILY    montelukast 10 MG Oral Tab Take 1 tablet (10 mg total) by mouth every morning.    Vitamin D3, Cholecalciferol, (VITAMIN D3) 125 MCG (5000 UT) Oral Cap Take 1 capsule (5,000 Units total) by mouth daily.    STELARA 90 MG/ML Subcutaneous Solution Prefilled Syringe injection Inject into the skin every 3 (three) months. Takes every 2 months subcut    metFORMIN HCl 1000 MG Oral Tab Take 1 tablet (1,000 mg total) by mouth 2 (two) times daily.      Diagnoses for This Visit    Iron deficiency anemia secondary to inadequate dietary iron intake   [280.1.ICD-9-CM]  -  Primary  Iron deficiency anemia due to chronic blood loss   [892893]    Other specified intestinal malabsorption   [579.8.ICD-9-CM]    B12 deficiency   [400614]             Future Appointments        Provider Department    3/7/2024 1:45 PM Vic Moore [facility]    3/8/2024 1:00 PM [facility] Hematology Oncology    3/8/2024 1:30 PM Ketan Caputo [facility] Hematology Oncology    7/8/2024 2:00 PM Corbin Schwartz [facility], [address], [place]                Did you know that [facility] primary care physicians now offer Video Visits through [employer] for adult patients for a variety of conditions such as allergies, back pain and cold symptoms? Skip the drive and waiting room and online chat with a doctor face-to-face using your web-cam enabled computer or mobile device wherever you are. Video Visits cost $50 and can be paid hassle-free using a credit, debit, or health savings card.  Not active on [employer]? Ask us how to get signed up today!          If you receive a  survey from [employer], please take a few minutes to complete it and provide feedback. We strive to deliver the best patient experience and are looking for ways to make improvements. Your feedback will help us do so. For more information on [employer], please visit [email or web address]           No text in SmartText           No text in SmartText

## (undated) NOTE — LETTER
December 12, 2020    Jenn Barnes, [address]     Patient: Jose Miguel Bryan   YOB: 1985   Date of Visit: 12/12/2020       Dear Dr. Gloria Ocampo MD:    Thank you for referring Ritu Johnson to me for evalu • Vitamin B-12 500 MCG Oral Tab Take 1 tablet (500 mcg total) by mouth daily. 90 tablet 0   • Adalimumab (HUMIRA) 40 MG/0.8ML Subcutaneous Prefilled Syringe Kit Inject 40 mg into the skin once a week.  Take once weekly on tuesday     • glimepiride 2 MG Oral Pt is happy without glasses. ASSESSMENT/PLAN:     Diagnoses and Plan:     Type 2 diabetes mellitus without retinopathy ([address]  Diabetes type II: no background of retinopathy, no signs of neovascularization noted.   Discussed ocular and syst